# Patient Record
Sex: FEMALE | Race: WHITE | Employment: OTHER | URBAN - METROPOLITAN AREA
[De-identification: names, ages, dates, MRNs, and addresses within clinical notes are randomized per-mention and may not be internally consistent; named-entity substitution may affect disease eponyms.]

---

## 2014-05-19 LAB — PAP-ABSTRACT: NORMAL

## 2017-01-05 ENCOUNTER — TELEPHONE (OUTPATIENT)
Dept: NURSING | Facility: CLINIC | Age: 82
End: 2017-01-05

## 2017-01-05 DIAGNOSIS — Z78.0 MENOPAUSE: Primary | ICD-10-CM

## 2017-01-05 RX ORDER — ESTRADIOL 0.5 MG/1
TABLET ORAL
Qty: 30 TABLET | Refills: 0 | OUTPATIENT
Start: 2017-01-05

## 2017-01-05 NOTE — TELEPHONE ENCOUNTER
Estradiol 0.5mg      Last Written Prescription Date:  11/29/16  Last Fill Quantity: 30,   # refills: 0  Last Office Visit with [facility] primary care provider:  5/20/15  Future Office visit: none    One month extension already received, overdue for annual, still no appt made. Rx denied.

## 2017-01-06 RX ORDER — ESTRADIOL 0.5 MG/1
0.5 TABLET ORAL DAILY
Qty: 30 TABLET | Refills: 0 | Status: SHIPPED | OUTPATIENT
Start: 2017-01-06 | End: 2017-01-26

## 2017-01-06 NOTE — TELEPHONE ENCOUNTER
JOCELYN Villalobos approved Rx for one more month (which she sent electronically to her pharmacy). Called pt a couple times because it sounded like someone picked up the telepone but no one was speaking on the other end and then they hung up. Will call back later to inform her the Rx was sent to her pharmacy for one month.

## 2017-01-06 NOTE — TELEPHONE ENCOUNTER
Pt calling she just scheduled her annual exam with Dr. Haddad for 1/18/17 and would like a refill of the Estradiol 0.5 mg tablet. Takes one tablet everyday.    Estradiol 0.5 MG tablet      Last Written Prescription Date:  11/29/16  Last Fill Quantity: 30 tabs,   # refills: 0  Last Office Visit with [facility] primary care provider:  5/20/15-Annual  Future Office visit: 1/18/17    Routing refill request to provider for review/approval because:  Pt is overdue for annual exam and has already had a one month extension. Since she now she has appt scheduled, ok to send one more month? Rx & pharmacy pended. Routing to JOCELYN Villalobos.

## 2017-01-26 DIAGNOSIS — Z78.0 MENOPAUSE: Primary | ICD-10-CM

## 2017-01-27 RX ORDER — ESTRADIOL 0.5 MG/1
TABLET ORAL
Qty: 30 TABLET | Refills: 0 | Status: SHIPPED | OUTPATIENT
Start: 2017-01-27 | End: 2017-01-31

## 2017-01-27 NOTE — TELEPHONE ENCOUNTER
Estradiol 0.5mg      Last Written Prescription Date:  1/6/17  Last Fill Quantity: 30,   # refills: 0  Last Office Visit with [facility] primary care provider:  5/20/15  Future Office visit: 1/31/17    Additional month supply sent to get pt to her scheduled annual appt.

## 2017-01-31 ENCOUNTER — OFFICE VISIT (OUTPATIENT)
Dept: OBGYN | Facility: CLINIC | Age: 82
End: 2017-01-31
Payer: MEDICARE

## 2017-01-31 VITALS
BODY MASS INDEX: 25.27 KG/M2 | WEIGHT: 148 LBS | DIASTOLIC BLOOD PRESSURE: 94 MMHG | SYSTOLIC BLOOD PRESSURE: 154 MMHG | HEIGHT: 64 IN

## 2017-01-31 DIAGNOSIS — Z78.0 MENOPAUSE: ICD-10-CM

## 2017-01-31 DIAGNOSIS — Z12.31 ENCOUNTER FOR SCREENING MAMMOGRAM FOR MALIGNANT NEOPLASM OF BREAST: ICD-10-CM

## 2017-01-31 DIAGNOSIS — Z13.820 OSTEOPOROSIS SCREENING: ICD-10-CM

## 2017-01-31 DIAGNOSIS — Z01.419 ENCOUNTER FOR GYNECOLOGICAL EXAMINATION WITHOUT ABNORMAL FINDING: Primary | ICD-10-CM

## 2017-01-31 PROCEDURE — G0145 SCR C/V CYTO,THINLAYER,RESCR: HCPCS | Performed by: OBSTETRICS & GYNECOLOGY

## 2017-01-31 PROCEDURE — G0101 CA SCREEN;PELVIC/BREAST EXAM: HCPCS | Mod: GA | Performed by: OBSTETRICS & GYNECOLOGY

## 2017-01-31 RX ORDER — ROSUVASTATIN CALCIUM 20 MG/1
20 TABLET, COATED ORAL DAILY
COMMUNITY
Start: 2017-01-20 | End: 2019-03-20

## 2017-01-31 RX ORDER — LEVETIRACETAM 250 MG/1
250 TABLET ORAL DAILY
COMMUNITY
Start: 2015-09-22 | End: 2019-03-20

## 2017-01-31 RX ORDER — PROPRANOLOL HYDROCHLORIDE 20 MG/1
20 TABLET ORAL 2 TIMES DAILY
COMMUNITY
Start: 2014-04-02 | End: 2019-03-20

## 2017-01-31 RX ORDER — TIOTROPIUM BROMIDE 18 UG/1
1 CAPSULE ORAL; RESPIRATORY (INHALATION) DAILY
COMMUNITY
Start: 2011-06-27 | End: 2019-04-17

## 2017-01-31 RX ORDER — ESCITALOPRAM OXALATE 10 MG/1
TABLET ORAL
COMMUNITY
Start: 2015-12-28 | End: 2019-03-19

## 2017-01-31 RX ORDER — CLONAZEPAM 0.5 MG/1
0.25 TABLET ORAL
COMMUNITY
Start: 2014-11-18 | End: 2019-03-19

## 2017-01-31 RX ORDER — ESTRADIOL 0.5 MG/1
0.5 TABLET ORAL DAILY
Qty: 90 TABLET | Refills: 3 | Status: SHIPPED | OUTPATIENT
Start: 2017-01-31 | End: 2018-02-16

## 2017-01-31 ASSESSMENT — ANXIETY QUESTIONNAIRES
IF YOU CHECKED OFF ANY PROBLEMS ON THIS QUESTIONNAIRE, HOW DIFFICULT HAVE THESE PROBLEMS MADE IT FOR YOU TO DO YOUR WORK, TAKE CARE OF THINGS AT HOME, OR GET ALONG WITH OTHER PEOPLE: NOT DIFFICULT AT ALL
7. FEELING AFRAID AS IF SOMETHING AWFUL MIGHT HAPPEN: NOT AT ALL
3. WORRYING TOO MUCH ABOUT DIFFERENT THINGS: NOT AT ALL
6. BECOMING EASILY ANNOYED OR IRRITABLE: NOT AT ALL
2. NOT BEING ABLE TO STOP OR CONTROL WORRYING: NOT AT ALL
5. BEING SO RESTLESS THAT IT IS HARD TO SIT STILL: NOT AT ALL
1. FEELING NERVOUS, ANXIOUS, OR ON EDGE: NOT AT ALL
GAD7 TOTAL SCORE: 0

## 2017-01-31 ASSESSMENT — PATIENT HEALTH QUESTIONNAIRE - PHQ9: 5. POOR APPETITE OR OVEREATING: NOT AT ALL

## 2017-01-31 NOTE — MR AVS SNAPSHOT
After Visit Summary   1/31/2017    Zoraida Olivarez    MRN: [id number]           Patient Information     Date Of Birth          4/11/1934        Visit Information        Provider Department      1/31/2017 11:30 AM Julien Haddad MD [facility]        Today's Diagnoses     Encounter for gynecological examination without abnormal finding [Z01.419]    -  1     Encounter for screening mammogram for malignant neoplasm of breast         Osteoporosis screening         Menopause            Follow-ups after your visit        Your next 10 appointments already scheduled     Feb 22, 2017 11:00 AM   MA SCREENING DIGITAL BILATERAL with [email or web address]   [facility] ([facility])    [address], [address]  [facility] [id number] [phone]           Do not use any powder, lotion or deodorant under your arms or on your breast. If you do, we will ask you to remove it before your exam.  Wear comfortable, two-piece clothing.  If you have any allergies, tell your care team.  Bring any previous mammograms from other facilities or have them mailed to the breast center.            Feb 22, 2017 11:30 AM   DX HIP/PELVIS/SPINE with [email or web address]   [facility] ([facility])    [address]  [address]  [facility] [id number] [phone]           Please do not take any of the following 48 hours prior to your exam: vitamins, calcium tablets, antacids.              Future tests that were ordered for you today     Open Future Orders        Priority Expected Expires Ordered    *MA Screening Digital Bilateral Routine  1/31/2018 1/31/2017            Who to contact     If you have questions or need follow up information about today's clinic visit or your schedule please contact [facility] directly at [phone].  Normal or non-critical lab and imaging results will be communicated to you by  "MyChart, letter or phone within 4 business days after the clinic has received the results. If you do not hear from us within 7 days, please contact the clinic through [employer] or phone. If you have a critical or abnormal lab result, we will notify you by phone as soon as possible.  Submit refill requests through [employer] or call your pharmacy and they will forward the refill request to us. Please allow 3 business days for your refill to be completed.          Additional Information About Your Visit        [employer] Information     [employer] lets you send messages to your doctor, view your test results, renew your prescriptions, schedule appointments and more. To sign up, go to [email or web address] . Click on \"Log in\" on the left side of the screen, which will take you to the Welcome page. Then click on \"Sign up Now\" on the right side of the page.     You will be asked to enter the access code listed below, as well as some personal information. Please follow the directions to create your username and password.     Your access code is: [email or web address]  Expires: 2017 12:22 PM     Your access code will  in 90 days. If you need help or a new code, please call your [facility] clinic or [phone].        Care EveryWhere ID     This is your Care EveryWhere ID. This could be used by other organizations to access your [facility] medical records  [id number]        Your Vitals Were     Height BMI (Body Mass Index) Breastfeeding?             5' 3.5\" (1.613 m) 25.80 kg/m2 No          Blood Pressure from Last 3 Encounters:   17 154/94   05/20/15 122/70    Weight from Last 3 Encounters:   17 148 lb (67.132 kg)   05/20/15 135 lb (61.236 kg)              We Performed the Following     DEXA - HIM Scan     Medicare Limited Visit     Pap imaged thin layer screen reflex to HPV if ASCUS - recommended age 25 - 29 years          Today's Medication Changes          These changes are accurate as of: 17 12:22 PM.  If you " have any questions, ask your nurse or doctor.               These medicines have changed or have updated prescriptions.        Dose/Directions    estradiol 0.5 MG tablet   Commonly known as:  ESTRACE   This may have changed:  See the new instructions.   Used for:  Menopause   Changed by:  Julien Haddad MD        Dose:  0.5 mg   Take 1 tablet (0.5 mg) by mouth daily   Quantity:  90 tablet   Refills:  3            Where to get your medicines      These medications were sent to [facility] [id number] [place] [address]  [address], [facility] [address]     Phone:  [phone]    - estradiol 0.5 MG tablet             Primary Care Provider Office Phone # Fax #    Luiz Riddle [phone]       NAVJOT NICOLLET [place] [address]  [facility] [address]        Thank you!     Thank you for choosing [facility]  for your care. Our goal is always to provide you with excellent care. Hearing back from our patients is one way we can continue to improve our services. Please take a few minutes to complete the written survey that you may receive in the mail after your visit with us. Thank you!             Your Updated Medication List - Protect others around you: Learn how to safely use, store and throw away your medicines at www.disposemymeds.org.          This list is accurate as of: 1/31/17 12:22 PM.  Always use your most recent med list.                   Brand Name Dispense Instructions for use    ADVAIR DISKUS 250-50 MCG/DOSE diskus inhaler   Generic drug:  fluticasone-salmeterol          aspirin 81 MG EC tablet          clonazePAM 0.5 MG tablet    klonoPIN     Take 0.25 mg by mouth       CRESTOR 20 MG tablet   Generic drug:  rosuvastatin          escitalopram 10 MG tablet    LEXAPRO         estradiol 0.5 MG tablet    ESTRACE    90 tablet    Take 1 tablet (0.5 mg) by mouth daily       levETIRAcetam 250 MG  tablet    KEPPRA     250 mg       propranolol 20 MG tablet    INDERAL         tiotropium 18 MCG capsule    SPIRIVA     Take 1 puff by mouth

## 2017-01-31 NOTE — PROGRESS NOTES
Zoraida is a 82 year old No obstetric history on file. female who presents for Medicare Limited exam.     Do you have a Health Care Directive?: No: Advance care planning was reviewed with patient; patient declined at this time.    Fall risk:   Fallen 2 or more times in the past year?: No  Any fall with injury in the past year?: No    HPI :  No complaints    GYNECOLOGIC HISTORY:  No LMP recorded. Patient is postmenopausal..   reports that she has been smoking.  She has never used smokeless tobacco.  Tobacco Cessation Action Plan: Information offered: Patient not interested at this time  STD testing offered?  Declined  Last PHQ-9 score on record=   PHQ-9 SCORE 2017   Total Score 0     Last GAD7 score on record=   LORA-7 SCORE 2017   Total Score 0       HEALTH MAINTENANCE:  Cholesterol: Followed by PCP  Last Mammo: , Result: abnormal, Next Mammo: Pt does not have future mammo scheduled   Pap: (PAP      NIL   2015 )  DEXA: Unsure  Colonoscopy:  Unknown, Result:  normal, Next Colonoscopy: PRN    HISTORY:  Obstetric History       T2      TAB0   SAB0   E0   M0   L2       # Outcome Date GA Lbr Vic/2nd Weight Sex Delivery Anes PTL Lv   2 Term         Y   1 Term         Y        No past medical history on file.  No past surgical history on file.  No family history on file.  Social History     Social History     Marital Status:      Spouse Name: N/A     Number of Children: N/A     Years of Education: N/A     Social History Main Topics     Smoking status: Current Every Day Smoker     Smokeless tobacco: Never Used     Alcohol Use: Yes     Drug Use: No     Sexual Activity: Not Currently     Other Topics Concern     Not on file     Social History Narrative     No narrative on file     Current Outpatient Prescriptions   Medication Sig     aspirin 81 MG EC tablet      clonazePAM (KLONOPIN) 0.5 MG tablet Take 0.25 mg by mouth     rosuvastatin (CRESTOR) 20 MG tablet      escitalopram  "(LEXAPRO) 10 MG tablet      fluticasone-salmeterol (ADVAIR DISKUS) 250-50 MCG/DOSE diskus inhaler      levETIRAcetam (KEPPRA) 250 MG tablet 250 mg     propranolol (INDERAL) 20 MG tablet      tiotropium (SPIRIVA) 18 MCG capsule Take 1 puff by mouth     estradiol (ESTRACE) 0.5 MG tablet TAKE 1 TABLET BY MOUTH DAILY     No current facility-administered medications for this visit.     No Known Allergies    Past medical, surgical, social and family history were reviewed and updated in EPIC.    EXAM:  Ht 5' 3.5\" (1.613 m)  Wt 148 lb (67.132 kg)  BMI 25.80 kg/m2  Breastfeeding? No   BMI: Body mass index is 25.8 kg/(m^2).    Constitutional: Appearance: Well nourished, well developed alert, in no acute distress  Breasts: Inspection of Breasts:  No lymphadenopathy present    Palpation of Breasts and Axillae:  No masses present on palpation, no  breast tenderness    Axillary Lymph Nodes:  No lymphadenopathy present  Neurologic/Psychiatric:    Mental Status:  Oriented X3     Pelvic Exam:  External Genitalia:     Normal appearance for age, no discharge present, no tenderness present, no inflammatory lesions present, color normal  Vagina:     Normal vaginal vault without central or paravaginal defects, no discharge present, no inflammatory lesions present, no masses present  Bladder:     Nontender to palpation  Urethra:   Urethral Body:  Urethra palpation normal, urethra structural support normal   Urethral Meatus:  No erythema or lesions present  Cervix:     Surgically absent  Uterus:     Surgically absent  Adnexa:     No adnexal tenderness present, no adnexal masses present  Perineum:     Perineum within normal limits, no evidence of trauma, no rashes or skin lesions present  Anus:     Anus within normal limits, no hemorrhoids present  Inguinal Lymph Nodes:     No lymphadenopathy present  Pubic Hair:     Normal pubic hair distribution for age  Genitalia and Groin:     No rashes present, no lesions present, no areas of " discoloration, no masses present    Body mass index is 25.8 kg/(m^2).     reports that she has been smoking.  She has never used smokeless tobacco.  Tobacco Cessation Action Plan: Information offered: Patient not interested at this time    ASSESSMENT:  82 year old female with satisfactory annual exam.    ICD-10-CM    1. Encounter for gynecological examination without abnormal finding [Z01.419] Z01.419        COUNSELING:   Reviewed preventive health counseling, as reflected in patient instructions       Regular exercise       Healthy diet/nutrition    PLAN/PATIENT INSTRUCTIONS:    There are no Patient Instructions on file for this visit.    Julien Haddad MD

## 2017-01-31 NOTE — Clinical Note
[facility]  [address] [place] [address] , [address]  [place]   [address] [phone]      2/2/2017     Zoraida Olivarez   [address]  [place] [address]      Dear Zoraida,  We are happy to inform you that your PAP smear result is normal.  We are now able to do a follow up test on PAP smears. The DNA test is for HPV (Human Papilloma Virus). Cervical cancer is closely linked with certain types of HPV. Your result showed no evidence of HPV.  Therefore we recommend you return in 1 year for your next pap smear.  You will still need to return to the clinic every year for an annual exam and other preventive tests.  Please contact the clinic with any questions.  Sincerely,    Julien Haddad MD

## 2017-02-01 ASSESSMENT — ANXIETY QUESTIONNAIRES: GAD7 TOTAL SCORE: 0

## 2017-02-01 ASSESSMENT — PATIENT HEALTH QUESTIONNAIRE - PHQ9: SUM OF ALL RESPONSES TO PHQ QUESTIONS 1-9: 0

## 2017-02-02 LAB
COPATH REPORT: NORMAL
PAP: NORMAL

## 2017-02-22 ENCOUNTER — RADIANT APPOINTMENT (OUTPATIENT)
Dept: MAMMOGRAPHY | Facility: CLINIC | Age: 82
End: 2017-02-22
Payer: MEDICARE

## 2017-02-22 ENCOUNTER — RADIANT APPOINTMENT (OUTPATIENT)
Dept: BONE DENSITY | Facility: CLINIC | Age: 82
End: 2017-02-22
Payer: MEDICARE

## 2017-02-22 DIAGNOSIS — Z12.31 ENCOUNTER FOR SCREENING MAMMOGRAM FOR MALIGNANT NEOPLASM OF BREAST: ICD-10-CM

## 2017-02-22 DIAGNOSIS — Z78.0 ASYMPTOMATIC POSTMENOPAUSAL STATE: ICD-10-CM

## 2017-02-22 PROCEDURE — 77080 DXA BONE DENSITY AXIAL: CPT

## 2017-02-22 PROCEDURE — G0202 SCR MAMMO BI INCL CAD: HCPCS | Mod: TC

## 2018-02-16 DIAGNOSIS — Z78.0 MENOPAUSE: ICD-10-CM

## 2018-02-19 DIAGNOSIS — Z78.0 MENOPAUSE: ICD-10-CM

## 2018-02-19 RX ORDER — ESTRADIOL 0.5 MG/1
TABLET ORAL
Qty: 30 TABLET | Refills: 0 | Status: SHIPPED | OUTPATIENT
Start: 2018-02-19 | End: 2018-03-29

## 2018-02-19 RX ORDER — ESTRADIOL 0.5 MG/1
TABLET ORAL
Qty: 90 TABLET | Refills: 0 | OUTPATIENT
Start: 2018-02-19

## 2018-02-19 NOTE — TELEPHONE ENCOUNTER
Estradiol 0.5mg      Last Written Prescription Date:  2/19/18  Last Fill Quantity: 30,   # refills: 0  Last Office Visit: 1/31/17  Future Office visit:       90 day supply request denied, [place] protocol, one month supply only.

## 2018-02-19 NOTE — TELEPHONE ENCOUNTER
Estradiol 0.5mg      Last Written Prescription Date:  1/31/17  Last Fill Quantity: 90,   # refills: 3  Last Office Visit: 1/31/17  Future Office visit:       Medication is being filled for 1 time refill only due to:  DUE FOR ANNUAL  Pt due for annual, no appt scheduled. One month extension sent per [employer] protocol.

## 2018-03-21 DIAGNOSIS — Z78.0 MENOPAUSE: ICD-10-CM

## 2018-03-21 RX ORDER — ESTRADIOL 0.5 MG/1
TABLET ORAL
Qty: 30 TABLET | Refills: 0 | OUTPATIENT
Start: 2018-03-21

## 2018-03-26 DIAGNOSIS — Z78.0 MENOPAUSE: ICD-10-CM

## 2018-03-26 RX ORDER — ESTRADIOL 0.5 MG/1
TABLET ORAL
Qty: 30 TABLET | Refills: 0 | OUTPATIENT
Start: 2018-03-26

## 2018-03-26 NOTE — TELEPHONE ENCOUNTER
estradiol (ESTRACE) 0.5 MG tablet    Last Written Prescription Date:  2/19/18  Last Fill Quantity: 30,   # refills: 0  Last Office Visit with [facility] primary care provider:  1/31/17  Future Office visit: none    Routing refill request to provider for review/approval because:  Refill denied.

## 2018-03-28 ENCOUNTER — TELEPHONE (OUTPATIENT)
Dept: NURSING | Facility: CLINIC | Age: 83
End: 2018-03-28

## 2018-03-28 DIAGNOSIS — Z78.0 MENOPAUSE: ICD-10-CM

## 2018-03-28 NOTE — TELEPHONE ENCOUNTER
Pt calling in for Estradiol (estrace) refill. Has two tablets left. Reviewed with pt that she's due to schedule annual appt with MD Haddad. She was transferred to Scheduling and now has appt set for 4/4/18.       Rx:  Estradiol (Estrace) 0.5mg tab, 1 tab PO daily    Last Written Prescription Date:  2/19/18  Last Fill Quantity: 30,   # refills: 0  Last Office Visit: 1/31/17  Future Office visit:    Next 5 appointments (look out 90 days)     Apr 04, 2018 11:15 AM CDT   PHYSICAL with Julien Haddad MD   [facility] ([facility]    [address] [place] [id number] [phone]                 Pt due for annual, appt scheduled, 3 month supply sent for insurance purposes.

## 2018-03-29 RX ORDER — ESTRADIOL 0.5 MG/1
TABLET ORAL
Qty: 90 TABLET | Refills: 0 | Status: SHIPPED | OUTPATIENT
Start: 2018-03-29 | End: 2018-06-24

## 2018-04-03 ENCOUNTER — TELEPHONE (OUTPATIENT)
Dept: OBGYN | Facility: CLINIC | Age: 83
End: 2018-04-03

## 2018-04-03 NOTE — TELEPHONE ENCOUNTER
Pt states that someone called her with questions about a mammogram I am only seeing a encounter from yesterday about a refill request.  Pt seems a little confused as to what the call was about.  I asked her if it was the appointment reminder call from yesterday and she said no.

## 2018-04-03 NOTE — TELEPHONE ENCOUNTER
Talked with clinic scheduler (Johanne Lopez-who sent message below) and she indicated Triage does not need to call pt as she has figured out what the pt was confused about.      Closing encounter.

## 2018-06-24 DIAGNOSIS — Z78.0 MENOPAUSE: ICD-10-CM

## 2018-06-26 RX ORDER — ESTRADIOL 0.5 MG/1
TABLET ORAL
Qty: 30 TABLET | Refills: 0 | Status: SHIPPED | OUTPATIENT
Start: 2018-06-26 | End: 2019-03-20

## 2018-06-26 NOTE — TELEPHONE ENCOUNTER
"Requested Prescriptions   Pending Prescriptions Disp Refills     estradiol (ESTRACE) 0.5 MG tablet [Pharmacy Med Name: ESTRADIOL 0.5MG TABLETS] 90 tablet 0     Sig: TAKE 1 TABLET(0.5 MG) BY MOUTH DAILY  Last Written Prescription Date:  3/29/18  Last Fill Quantity: 90,  # refills: 0   Last office visit: No previous visit found with prescribing provider:  Dr. Haddad 1/31/17   Future Office Visit:  None      Hormone Replacement Therapy Failed    6/24/2018 11:45 AM       Failed - Blood pressure under 140/90 in past 12 months    BP Readings from Last 3 Encounters:   01/31/17 (!) 154/94   05/20/15 122/70                Failed - Recent (12 mo) or future (30 days) visit within the authorizing provider's specialty    Patient had office visit in the last 12 months or has a visit in the next 30 days with authorizing provider or within the authorizing provider's specialty.  See \"Patient Info\" tab in inbasket, or \"Choose Columns\" in Meds & Orders section of the refill encounter.           Passed - Patient is 18 years of age or older       Passed - No active pregnancy on record       Passed - No positive pregnancy test on record in past 12 months      Medication is being filled for 1 time refill only due to:  Patient needs to be seen because it has been more than one year since last visit.      "

## 2019-03-13 ENCOUNTER — DOCUMENTATION ONLY (OUTPATIENT)
Dept: OTHER | Facility: CLINIC | Age: 84
End: 2019-03-13

## 2019-03-18 ENCOUNTER — ASSISTED LIVING VISIT (OUTPATIENT)
Dept: GERIATRICS | Facility: CLINIC | Age: 84
End: 2019-03-18
Payer: MEDICARE

## 2019-03-18 VITALS
RESPIRATION RATE: 20 BRPM | HEIGHT: 65 IN | BODY MASS INDEX: 23.82 KG/M2 | WEIGHT: 143 LBS | OXYGEN SATURATION: 96 % | HEART RATE: 68 BPM | TEMPERATURE: 98.3 F | SYSTOLIC BLOOD PRESSURE: 128 MMHG | DIASTOLIC BLOOD PRESSURE: 67 MMHG

## 2019-03-18 DIAGNOSIS — I73.9 PVD (PERIPHERAL VASCULAR DISEASE) (H): ICD-10-CM

## 2019-03-18 DIAGNOSIS — F32.1 CURRENT MODERATE EPISODE OF MAJOR DEPRESSIVE DISORDER, UNSPECIFIED WHETHER RECURRENT (H): ICD-10-CM

## 2019-03-18 DIAGNOSIS — J44.9 CHRONIC OBSTRUCTIVE PULMONARY DISEASE, UNSPECIFIED COPD TYPE (H): ICD-10-CM

## 2019-03-18 DIAGNOSIS — R41.89 COGNITIVE IMPAIRMENT: ICD-10-CM

## 2019-03-18 DIAGNOSIS — F51.01 PRIMARY INSOMNIA: ICD-10-CM

## 2019-03-18 DIAGNOSIS — K59.01 SLOW TRANSIT CONSTIPATION: ICD-10-CM

## 2019-03-18 DIAGNOSIS — M79.675 PAIN IN TOE OF LEFT FOOT: ICD-10-CM

## 2019-03-18 DIAGNOSIS — G25.0 ESSENTIAL TREMOR: ICD-10-CM

## 2019-03-18 DIAGNOSIS — Z86.79 HX OF ANEURYSM: ICD-10-CM

## 2019-03-18 DIAGNOSIS — I48.20 CHRONIC ATRIAL FIBRILLATION (H): ICD-10-CM

## 2019-03-18 DIAGNOSIS — Z76.89 ENCOUNTER TO ESTABLISH CARE: Primary | ICD-10-CM

## 2019-03-18 DIAGNOSIS — R13.10 DYSPHAGIA, UNSPECIFIED TYPE: ICD-10-CM

## 2019-03-18 PROBLEM — N39.0 URINARY TRACT INFECTION WITHOUT HEMATURIA: Status: ACTIVE | Noted: 2019-02-12

## 2019-03-18 PROBLEM — G93.41 METABOLIC ENCEPHALOPATHY: Status: ACTIVE | Noted: 2019-02-12

## 2019-03-18 PROBLEM — E87.6 HYPOKALEMIA: Status: ACTIVE | Noted: 2019-03-18

## 2019-03-18 PROBLEM — Z81.2 FAMILY HISTORY OF TOBACCO ABUSE AND DEPENDENCE: Status: ACTIVE | Noted: 2019-03-18

## 2019-03-18 PROBLEM — K52.9 CHRONIC DIARRHEA: Status: ACTIVE | Noted: 2019-03-18

## 2019-03-18 RX ORDER — ACETAMINOPHEN 325 MG/1
650 TABLET ORAL EVERY 6 HOURS PRN
COMMUNITY
End: 2020-05-01

## 2019-03-18 RX ORDER — POLYETHYLENE GLYCOL 3350 17 G/17G
17 POWDER, FOR SOLUTION ORAL DAILY
COMMUNITY
End: 2020-02-14

## 2019-03-18 RX ORDER — LANOLIN ALCOHOL/MO/W.PET/CERES
3 CREAM (GRAM) TOPICAL AT BEDTIME
COMMUNITY
End: 2019-03-20

## 2019-03-18 RX ORDER — ALBUTEROL SULFATE 90 UG/1
2 AEROSOL, METERED RESPIRATORY (INHALATION) EVERY 4 HOURS PRN
COMMUNITY
End: 2020-02-14

## 2019-03-18 RX ORDER — LACTOSE-REDUCED FOOD
LIQUID (ML) ORAL 2 TIMES DAILY
COMMUNITY
End: 2021-11-07

## 2019-03-18 ASSESSMENT — MIFFLIN-ST. JEOR: SCORE: 1091.58

## 2019-03-18 NOTE — LETTER
"    3/18/2019        RE: Zoraida Olivarez  [facility]  [address]  [facility] [address]        [place] [facility]  PRIMARY CARE PROVIDER AND CLINIC:  Lynn Gallagher, CYNDI CNP, [address] [facility] [address] / [facility] [address]  Chief Complaint   Patient presents with     [facility]     [place] Medical Record Number:  [id number]  Place of Service where encounter took place:  [facility] ([facility]) [[id number]]    Zoraida Olivarez  is a 84 year old  (4/11/1934) female with a medical history of PVD and COPD. She was hospitalized at [facility] from 2/12-2/18/19 after being found down at home by her daughter. She was found to have UTI in which she was treated with antibiotics; and received oral steroids for COPD exacerbation and a course of doxycline for pneumonia. She was discharged to [facility] TCU stay 2/18/19 through 3/13/19. TCU stay uncomplicated; treated for another UTI and due to declining cognition, was unable to return to prior independent living so she moved to [facility].     Ms. Olivarez was visited today while in her room. She denies complaints of pain or discomfort. She has a productive cough but unable to cough up the phlegm. Denies shortness of breath. Asked if she recalled her address, \"why are you asking me that hard question?\" Reports that she has 5 grandchildren and 2 children. She was visited by her grandaughter and great grandchildren yesterday which she enjoyed. Daughter reports that Dylan smoked 1/2-1 pack of cigarettes per day since she was 16 years old. She has frequently put her jacket on and grabs her purse seeking to go outside to smoke. Staff does not have concerns and report she has been eating well but has pocketed food.   /72, HR 62, O2-92% on room air.     CODE STATUS/ADVANCE DIRECTIVES DISCUSSION:   DNR / DNI  Patient's living condition: lives in assisted living  ALLERGIES: " Marisol anti-gas [simethicone]  PAST MEDICAL HISTORY:  has a past medical history of Cerebral aneurysm, Chronic obstructive lung disease (H), Hearing loss, sensorineural, combined types, History of shingles, Hyperlipidemia, Ileitis, Incomplete RBBB, Major depression, Memory loss, PAD (peripheral artery disease) (H), Polyp of colon, adenomatous, Skin cancer of arm, Tobacco abuse, and Tremor, essential.  PAST SURGICAL HISTORY:   has a past surgical history that includes appendectomy; Cholecystectomy; and Hysterectomy.  FAMILY HISTORY: family history includes Cancer in her brother, father, mother, and another family member; Heart Disease in her father.  SOCIAL HISTORY:   reports that she has been smoking.  she has never used smokeless tobacco. She reports that she drinks alcohol. She reports that she does not use drugs.    Post Discharge Medication Reconciliation Status: discharge medications reconciled, continue medications without change    Current Outpatient Medications   Medication Sig Dispense Refill     acetaminophen (TYLENOL) 325 MG tablet Take 650 mg by mouth daily as needed for mild pain       albuterol (PROAIR HFA/PROVENTIL HFA/VENTOLIN HFA) 108 (90 Base) MCG/ACT inhaler Inhale 2 puffs into the lungs every 4 hours as needed for shortness of breath / dyspnea or wheezing       aspirin 81 MG EC tablet Take 81 mg by mouth daily        estradiol (ESTRACE) 0.5 MG tablet TAKE 1 TABLET(0.5 MG) BY MOUTH DAILY 30 tablet 0     fluticasone-salmeterol (ADVAIR DISKUS) 250-50 MCG/DOSE diskus inhaler Inhale 1 puff into the lungs 2 times daily        guaiFENesin (MUCINEX) 600 MG 12 hr tablet Take 600 mg by mouth 2 times daily       levETIRAcetam (KEPPRA) 250 MG tablet Take 250 mg by mouth daily        melatonin 3 MG tablet Take 3 mg by mouth At Bedtime       Nutritional Supplements (ENSURE PLUS) LIQD Take by mouth 2 times daily       polyethylene glycol (MIRALAX/GLYCOLAX) packet Take 17 g by mouth daily       propranolol  "(INDERAL) 20 MG tablet Take 20 mg by mouth 2 times daily        rosuvastatin (CRESTOR) 20 MG tablet Take 20 mg by mouth daily        Sennosides (EQ NATURAL VEGETABLE LAXATIVE PO) Take 8.6 mg by mouth daily as needed       tiotropium (SPIRIVA) 18 MCG capsule Inhale 1 puff into the lungs daily        clonazePAM (KLONOPIN) 0.5 MG tablet Take 0.25 mg by mouth       escitalopram (LEXAPRO) 10 MG tablet        ROS:  4 point ROS including Respiratory, CV, GI and , other than that noted in the HPI,  is negative    Vitals:  /67   Pulse 68   Temp 98.3  F (36.8  C)   Resp 20   Ht 1.638 m (5' 4.5\")   Wt 64.9 kg (143 lb)   SpO2 96%   BMI 24.17 kg/m     Exam:  GENERAL APPEARANCE:  Alert, in no distress, pleasant, cooperative.  EYES:  EOM, lids, pupils and irises normal, sclera clear and conjunctiva normal, no discharge or mattering on lids or lashes noted  ENT:  Mouth normal, moist mucous membranes, nose without drainage or crusting, external ears without lesions, hearing acuity intact  NECK: supple, symmetrical  RESP:  respiratory effort and palpation of chest normal, no chest wall tenderness, no respiratory distress, Lung sounds diminished in bilateral bases otherwise clear, patient is on room air  CV:  Palpation and auscultation of heart done, rate and rhythm regular, no murmur. Edema none in bilateral lower extremities. VASCULAR: warm extremities without open areas. Vascular color changes  ABDOMEN:  normal bowel sounds, soft, nontender.  M/S:   Gait and station ambulates independently, no tenderness or swelling of the joints; able to move all extremities   SKIN:  Inspection and palpation of skin and subcutaneous tissue: skin warm, dry and intact without rashes. L great toe with bruising. No redness, warmth or swelling present.  NEURO: cranial nerves 2-12 grossly intact, no facial asymmetry, no speech deficits and able to follow directions, moves all extremities symmetrically  PSYCH:  insight and judgement " intact, memory impaired, affect and mood normal      Lab/Diagnostic data:  Labs done while at [facility] done by [facility] lab. All labs were reviewed in care everywhere.     ASSESSMENT/PLAN:  (J44.9) Chronic obstructive pulmonary disease, unspecified COPD type (H)  Comment: was treated for exacerbation while in the hospital. No longer has wheezing but now with chronic cough secondary to smoking 1/2-1 PPD for 60+ years.   Plan: guafenesis 600 mg q12h x 7 days then BID PRN.   -- continue with advair 1 puff BID  -- continue with spiriva 1 puff daily    (I73.9) PVD (peripheral vascular disease) (H)  Comment: warm extremities but has chronic skin changes.   Plan: continue with rosuvastatin 20 mg daily    (I48.2) Chronic atrial fibrillation (H)  Comment: had RVR while in the hospital; not put on AC given falls risk and cognitive impairment. Regular rhythm today with controlled rate. Is on propranolol for tremors which is in turn controlling rate.   Plan: continue with propranolol 20 mg BID    (F32.1) Current moderate episode of major depressive disorder, unspecified whether recurrent (H)  Comment: was on lexapro when living at home independently but was discontinued during hospitalization for reasons unknown. She has a history of both anxiety and depression and daughter reports she tolerated the lexapro well. Given the new facility and surroundings, she would benefit from restarting.   Plan: Restart lexapro 10 mg daily    (R41.89) Cognitive impairment  Comment: SLUMS 1/30 and ACL 3.8 during TCU stay. She appears to have a much higher score than 1/30 from our conversation today. Assume that score is higher now that she does not have acute illness. Could consider aricept or namenda in the future but will defer to neurologist as she has known him for 40+ years.   Plan: continue with memory care assisted living for assistance with cares, meals and medications.   -- OT to administer further cognitive  testing.     (M79.675) Pain in toe of left foot  Comment: no s/sx of infection or ingrown toenail. Appears more trauma related.   Plan:Tylenol for comfort.     (R13.10) Dysphagia, unspecified type  Comment: TCU notes with reports of pocketing food. Had a swallow evaluation done on  which was negative but she was started on a mechanical soft diet.   Plan:  SLP to evaluate swallowing.     (G25.0) Essential tremor  Comment: most notable in the right hand.   Plan: continue with propranolol 20 mg BID    (Z86.79) Hx of aneurysm  Comment: daughter reports this happened 40+ years ago and is now followed by Dr. Hightower with [facility].   Plan: continue with keppra 250 mg daily.   --follow up with neurology as scheduled in April.     (F51.01) Primary insomnia  Comment: sleeping well.   Plan: continue with melatonin at HS.     (K59.01) Slow transit constipation  Comment: having regular bowel movements.   Plan: continue with miralax daily.     Total time with a new patient visit in the assisted livin minutes including discussions about the POC and care coordination with the patient and family, Nava. Greater than 50% of total time spent with counseling and coordinating care due to chart review, phone call to Nava, new decline in cognitive impairment, COPD.  Electronically signed by:  CYNDI Miramontes CNP                     Sincerely,        CYNDI Miramontes CNP

## 2019-03-18 NOTE — PROGRESS NOTES
"[place] [facility]  PRIMARY CARE PROVIDER AND CLINIC:  Lynn Gallagher, CYNDI CNP, [address] / [place] [address]  Chief Complaint   Patient presents with     [facility]     [place] Medical Record Number:  [id number]  Place of Service where encounter took place:  [facility] ([facility]) [[id number]]    Zoraida Olivarez  is a 84 year old  (4/11/1934) female with a medical history of PVD and COPD. She was hospitalized at [facility] from 2/12-2/18/19 after being found down at home by her daughter. She was found to have UTI in which she was treated with antibiotics; and received oral steroids for COPD exacerbation and a course of doxycline for pneumonia. She was discharged to [facility] TCU stay 2/18/19 through 3/13/19. TCU stay uncomplicated; treated for another UTI and due to declining cognition, was unable to return to prior independent living so she moved to [facility].     Ms. Olivarez was visited today while in her room. She denies complaints of pain or discomfort. She has a productive cough but unable to cough up the phlegm. Denies shortness of breath. Asked if she recalled her address, \"why are you asking me that hard question?\" Reports that she has 5 grandchildren and 2 children. She was visited by her grandaughter and great grandchildren yesterday which she enjoyed. Daughter reports that Dylan smoked 1/2-1 pack of cigarettes per day since she was 16 years old. She has frequently put her jacket on and grabs her purse seeking to go outside to smoke. Staff does not have concerns and report she has been eating well but has pocketed food.   /72, HR 62, O2-92% on room air.     CODE STATUS/ADVANCE DIRECTIVES DISCUSSION:   DNR / DNI  Patient's living condition: lives in assisted living  ALLERGIES: Tanya-seltzer anti-gas [simethicone]  PAST MEDICAL HISTORY:  has a past medical history of Cerebral aneurysm, Chronic " obstructive lung disease (H), Hearing loss, sensorineural, combined types, History of shingles, Hyperlipidemia, Ileitis, Incomplete RBBB, Major depression, Memory loss, PAD (peripheral artery disease) (H), Polyp of colon, adenomatous, Skin cancer of arm, Tobacco abuse, and Tremor, essential.  PAST SURGICAL HISTORY:   has a past surgical history that includes appendectomy; Cholecystectomy; and Hysterectomy.  FAMILY HISTORY: family history includes Cancer in her brother, father, mother, and another family member; Heart Disease in her father.  SOCIAL HISTORY:   reports that she has been smoking.  she has never used smokeless tobacco. She reports that she drinks alcohol. She reports that she does not use drugs.    Post Discharge Medication Reconciliation Status: discharge medications reconciled, continue medications without change    Current Outpatient Medications   Medication Sig Dispense Refill     acetaminophen (TYLENOL) 325 MG tablet Take 650 mg by mouth daily as needed for mild pain       albuterol (PROAIR HFA/PROVENTIL HFA/VENTOLIN HFA) 108 (90 Base) MCG/ACT inhaler Inhale 2 puffs into the lungs every 4 hours as needed for shortness of breath / dyspnea or wheezing       aspirin 81 MG EC tablet Take 81 mg by mouth daily        estradiol (ESTRACE) 0.5 MG tablet TAKE 1 TABLET(0.5 MG) BY MOUTH DAILY 30 tablet 0     fluticasone-salmeterol (ADVAIR DISKUS) 250-50 MCG/DOSE diskus inhaler Inhale 1 puff into the lungs 2 times daily        guaiFENesin (MUCINEX) 600 MG 12 hr tablet Take 600 mg by mouth 2 times daily       levETIRAcetam (KEPPRA) 250 MG tablet Take 250 mg by mouth daily        melatonin 3 MG tablet Take 3 mg by mouth At Bedtime       Nutritional Supplements (ENSURE PLUS) LIQD Take by mouth 2 times daily       polyethylene glycol (MIRALAX/GLYCOLAX) packet Take 17 g by mouth daily       propranolol (INDERAL) 20 MG tablet Take 20 mg by mouth 2 times daily        rosuvastatin (CRESTOR) 20 MG tablet Take 20 mg by  "mouth daily        Sennosides (EQ NATURAL VEGETABLE LAXATIVE PO) Take 8.6 mg by mouth daily as needed       tiotropium (SPIRIVA) 18 MCG capsule Inhale 1 puff into the lungs daily        clonazePAM (KLONOPIN) 0.5 MG tablet Take 0.25 mg by mouth       escitalopram (LEXAPRO) 10 MG tablet        ROS:  4 point ROS including Respiratory, CV, GI and , other than that noted in the HPI,  is negative    Vitals:  /67   Pulse 68   Temp 98.3  F (36.8  C)   Resp 20   Ht 1.638 m (5' 4.5\")   Wt 64.9 kg (143 lb)   SpO2 96%   BMI 24.17 kg/m    Exam:  GENERAL APPEARANCE:  Alert, in no distress, pleasant, cooperative.  EYES:  EOM, lids, pupils and irises normal, sclera clear and conjunctiva normal, no discharge or mattering on lids or lashes noted  ENT:  Mouth normal, moist mucous membranes, nose without drainage or crusting, external ears without lesions, hearing acuity intact  NECK: supple, symmetrical  RESP:  respiratory effort and palpation of chest normal, no chest wall tenderness, no respiratory distress, Lung sounds diminished in bilateral bases otherwise clear, patient is on room air  CV:  Palpation and auscultation of heart done, rate and rhythm regular, no murmur. Edema none in bilateral lower extremities. VASCULAR: warm extremities without open areas. Vascular color changes  ABDOMEN:  normal bowel sounds, soft, nontender.  M/S:   Gait and station ambulates independently, no tenderness or swelling of the joints; able to move all extremities   SKIN:  Inspection and palpation of skin and subcutaneous tissue: skin warm, dry and intact without rashes. L great toe with bruising. No redness, warmth or swelling present.  NEURO: cranial nerves 2-12 grossly intact, no facial asymmetry, no speech deficits and able to follow directions, moves all extremities symmetrically  PSYCH:  insight and judgement intact, memory impaired, affect and mood normal      Lab/Diagnostic data:  [facility] done by [facility] lab. All labs were reviewed in care everywhere.     ASSESSMENT/PLAN:  (J44.9) Chronic obstructive pulmonary disease, unspecified COPD type (H)  Comment: was treated for exacerbation while in the hospital. No longer has wheezing but now with chronic cough secondary to smoking 1/2-1 PPD for 60+ years.   Plan: guafenesis 600 mg q12h x 7 days then BID PRN.   -- continue with advair 1 puff BID  -- continue with spiriva 1 puff daily    (I73.9) PVD (peripheral vascular disease) (H)  Comment: warm extremities but has chronic skin changes.   Plan: continue with rosuvastatin 20 mg daily    (I48.2) Chronic atrial fibrillation (H)  Comment: had RVR while in the hospital; not put on AC given falls risk and cognitive impairment. Regular rhythm today with controlled rate. Is on propranolol for tremors which is in turn controlling rate.   Plan: continue with propranolol 20 mg BID    (F32.1) Current moderate episode of major depressive disorder, unspecified whether recurrent (H)  Comment: was on lexapro when living at home independently but was discontinued during hospitalization for reasons unknown. She has a history of both anxiety and depression and daughter reports she tolerated the lexapro well. Given the new facility and surroundings, she would benefit from restarting.   Plan: Restart lexapro 10 mg daily    (R41.89) Cognitive impairment  Comment: SLUMS 1/30 and ACL 3.8 during TCU stay. She appears to have a much higher score than 1/30 from our conversation today. Assume that score is higher now that she does not have acute illness. Could consider aricept or namenda in the future but will defer to neurologist as she has known him for 40+ years.   Plan: continue with memory care assisted living for assistance with cares, meals and medications.   -- OT to administer further cognitive testing.     (M99.302) Pain in toe of left foot  Comment: no s/sx of infection or ingrown toenail. Appears more trauma  related.   Plan:Tylenol for comfort.     (R13.10) Dysphagia, unspecified type  Comment: TCU notes with reports of pocketing food. Had a swallow evaluation done on  which was negative but she was started on a mechanical soft diet.   Plan:  SLP to evaluate swallowing.     (G25.0) Essential tremor  Comment: most notable in the right hand.   Plan: continue with propranolol 20 mg BID    (Z86.79) Hx of aneurysm  Comment: daughter reports this happened 40+ years ago and is now followed by Dr. Hightower with [facility].   Plan: continue with keppra 250 mg daily.   --follow up with neurology as scheduled in April.     (F51.01) Primary insomnia  Comment: sleeping well.   Plan: continue with melatonin at HS.     (K59.01) Slow transit constipation  Comment: having regular bowel movements.   Plan: continue with miralax daily.     Total time with a new patient visit in the assisted livin minutes including discussions about the POC and care coordination with the patient and family, Nava. Greater than 50% of total time spent with counseling and coordinating care due to chart review, phone call to Nava, new decline in cognitive impairment, COPD.  Electronically signed by:  CYNDI Miramontes CNP

## 2019-03-19 RX ORDER — GUAIFENESIN 600 MG/1
600 TABLET, EXTENDED RELEASE ORAL 2 TIMES DAILY
COMMUNITY
End: 2019-03-31

## 2019-03-21 ENCOUNTER — RECORDS - HEALTHEAST (OUTPATIENT)
Dept: LAB | Facility: CLINIC | Age: 84
End: 2019-03-21

## 2019-03-21 LAB — TSH SERPL DL<=0.005 MIU/L-ACNC: 1.74 UIU/ML (ref 0.3–5)

## 2019-03-22 LAB — 25(OH)D3 SERPL-MCNC: 49.3 NG/ML (ref 30–80)

## 2019-03-29 ENCOUNTER — ASSISTED LIVING VISIT (OUTPATIENT)
Dept: GERIATRICS | Facility: CLINIC | Age: 84
End: 2019-03-29
Payer: MEDICARE

## 2019-03-29 VITALS
BODY MASS INDEX: 22.81 KG/M2 | HEART RATE: 76 BPM | WEIGHT: 135 LBS | RESPIRATION RATE: 18 BRPM | DIASTOLIC BLOOD PRESSURE: 62 MMHG | SYSTOLIC BLOOD PRESSURE: 130 MMHG | TEMPERATURE: 97.6 F

## 2019-03-29 DIAGNOSIS — M79.675 PAIN IN TOE OF LEFT FOOT: ICD-10-CM

## 2019-03-29 DIAGNOSIS — J44.9 CHRONIC OBSTRUCTIVE PULMONARY DISEASE, UNSPECIFIED COPD TYPE (H): Primary | ICD-10-CM

## 2019-03-29 DIAGNOSIS — F32.1 CURRENT MODERATE EPISODE OF MAJOR DEPRESSIVE DISORDER, UNSPECIFIED WHETHER RECURRENT (H): ICD-10-CM

## 2019-03-29 DIAGNOSIS — R41.89 COGNITIVE IMPAIRMENT: ICD-10-CM

## 2019-03-29 DIAGNOSIS — I48.20 CHRONIC ATRIAL FIBRILLATION (H): ICD-10-CM

## 2019-03-29 RX ORDER — GINSENG 100 MG
CAPSULE ORAL DAILY
COMMUNITY
End: 2019-06-12

## 2019-03-29 RX ORDER — ESCITALOPRAM OXALATE 10 MG/1
10 TABLET ORAL DAILY
COMMUNITY
End: 2019-04-22

## 2019-03-29 RX ORDER — IPRATROPIUM BROMIDE AND ALBUTEROL SULFATE 2.5; .5 MG/3ML; MG/3ML
1 SOLUTION RESPIRATORY (INHALATION) EVERY 4 HOURS PRN
COMMUNITY
End: 2019-06-04

## 2019-03-29 NOTE — PROGRESS NOTES
[facility]  [place] Medical Record Number:  [id number]  Place of Service where encounter took place:  [facility] [[id number]]  Chief Complaint   Patient presents with     RECHECK       HPI:    Zoraida Olivarez  is a 84 year old (4/11/1934), who is being seen today for an episodic care visit.  HPI information obtained from: facility chart records, facility staff, patient report, [facility] chart review and family/first contact Nava report.     Resident visited today for a follow up visit. She appears to be settling in and adjusting to new setting. Staff does not have concerns and note that her cough has subsided. Resident denies chest pain, shortness of breath or discomfort. Denies pain to the toe. Feels tired today, believes she slept well last night. Spoke with Nava, daughter, today and she feels resident is doing well and does not have concerns.     Past Medical and Surgical History reviewed in Epic today.    MEDICATIONS:  Current Outpatient Medications   Medication Sig Dispense Refill     acetaminophen (TYLENOL) 325 MG tablet Take 650 mg by mouth daily as needed for mild pain       albuterol (PROAIR HFA/PROVENTIL HFA/VENTOLIN HFA) 108 (90 Base) MCG/ACT inhaler Inhale 2 puffs into the lungs every 4 hours as needed for shortness of breath / dyspnea or wheezing       ASPIRIN ADULT LOW STRENGTH 81 MG EC tablet TAKE 1 TABLET BY MOUTH ONCE DAILY 30 tablet 11     bacitracin 500 UNIT/GM OINT Apply topically daily       escitalopram (LEXAPRO) 10 MG tablet Take 10 mg by mouth daily       estradiol (ESTRACE) 0.5 MG tablet TAKE ONE-HALF TABLET (0.25MG) BY MOUTH ONCE DAILY 30 tablet 11     fluticasone-salmeterol (ADVAIR DISKUS) 250-50 MCG/DOSE diskus inhaler Inhale 1 puff into the lungs 2 times daily        ipratropium - albuterol 0.5 mg/2.5 mg/3 mL (DUONEB) 0.5-2.5 (3) MG/3ML neb solution Take 1 vial by nebulization 2 times daily AND BID PRN        levETIRAcetam (KEPPRA) 250 MG tablet TAKE 1 TABLET BY MOUTH ONCE DAILY 30 tablet 11     melatonin 3 MG tablet TAKE 1 TABLET BY MOUTH ONCE DAILY 30 tablet 11     Nutritional Supplements (ENSURE PLUS) LIQD Take by mouth 2 times daily       polyethylene glycol (MIRALAX/GLYCOLAX) packet Take 17 g by mouth daily       propranolol (INDERAL) 20 MG tablet TAKE 1 TABLET BY MOUTH TWICE DAILY 60 tablet 11     rosuvastatin (CRESTOR) 20 MG tablet TAKE 1 TABLET BY MOUTH ONCE DAILY 30 tablet 11     Sennosides (EQ NATURAL VEGETABLE LAXATIVE PO) Take 8.6 mg by mouth daily as needed       tiotropium (SPIRIVA) 18 MCG capsule Inhale 1 puff into the lungs daily        REVIEW OF SYSTEMS:  Limited secondary to cognitive impairment but today pt reports no complaints of pain.     Objective:  /62   Pulse 76   Temp 97.6  F (36.4  C)   Resp 18   Wt 61.2 kg (135 lb)   BMI 22.81 kg/m    Exam:  GENERAL APPEARANCE:  Alert, in no distress, pleasant, cooperative.  RESP:  respiratory effort and palpation of chest normal, no chest wall tenderness, no respiratory distress, Lung sounds diminished in bilateral bases otherwise clear, patient is on room air  CV:  Palpation and auscultation of heart done, rate and rhythm regular, no murmur. Edema none in bilateral lower extremities. VASCULAR: chronic color changes  ABDOMEN:  normal bowel sounds, soft, nontender.  M/S:   Gait and station ambulates independently, no tenderness or swelling of the joints; able to move all extremities   SKIN:  Inspection and palpation of skin and subcutaneous tissue: skin warm, dry and intact without rashes. Small scabbed area at tip of nail on left great toe. No redness or drainage.   NEURO: cranial nerves 2-12 grossly intact, no facial asymmetry, no speech deficits and able to follow directions, moves all extremities symmetrically  PSYCH:  insight and judgement intact, memory impaired, affect and mood normal    Labs:   Reviewed in care  everywhere    ASSESSMENT/PLAN:  (J44.9) Chronic obstructive pulmonary disease, unspecified COPD type (H)  Comment:  1/2-1 PPD for 60+ years. cough has subsided. Given age and cognitive impairment, nebulizers may be easier and ensure that she is getting all medications inhaled.   Plan: guafenesis 600 mg q12h BID PRN.   -- continue with advair 1 puff BID  -- continue with spiriva 1 puff daily  --duonebs q4h PRN     (I48.2) Chronic atrial fibrillation (H)  Comment: not on AC given falls risk and cognitive impairment. Regular rhythm today with controlled rate. Is on propranolol for tremors which is in turn controlling rate.   Plan: continue with propranolol 20 mg BID     (F32.1) Current moderate episode of major depressive disorder, unspecified whether recurrent (H)  Comment: sleepy this afternoon, seems to be settling in.   Plan: Continue lexapro 10 mg daily     (R41.89) Cognitive impairment  Comment: SLUMS  and ACL 3.8 during TCU stay; repeat ACL recently 3.8/5.8.   Plan: continue with memory care assisted living for assistance with cares, meals and medications.   -- follow up with neurology as scheduled at the end of April.      (X30.034) Pain in toe of left foot  Comment: no s/sx of infection. Evaluated by podiatry and had an ulcer underneath long toenail. Staff has used bacitracin and appears to be healing.   Plan:Tylenol for comfort.   --bacitracin daily until healed.     Electronically signed by:  CYNDI Miramontes CNP       Face to Face and Medical Necessity Statement for DME Provider visit    Demographic Information on Zoraida Olivarez:  Gender: female  : 1934  [facility]  [address] [facility] [address]  None (home) [phone] (work)    Medical Record: [id number]  Social Security Number: [id number]  Primary Care Provider: Lynn Gallagher  Insurance: Payor: MEDICARE / Plan: MEDICARE / Product Type: Medicare /     HPI:   Zoraida Olivarez is a 84 year  old  (4/11/1934), who is being seen today for a face to face provider visit at [facility]; medical necessity statement for DME included. This patient requires the following:  DME Ordered and Medical Necessity Statement   Nebulizer:  for duoneb medication  Pt needing above DME with expected length of need of lifetime use due to medical necessity associated with following diagnosis:     Chronic obstructive pulmonary disease, unspecified COPD type (H)    PMH   has a past medical history of Cerebral aneurysm, Chronic obstructive lung disease (H), Hearing loss, sensorineural, combined types, History of shingles, Hyperlipidemia, Ileitis, Incomplete RBBB, Major depression, Memory loss, PAD (peripheral artery disease) (H), Polyp of colon, adenomatous, Skin cancer of arm, Tobacco abuse, and Tremor, essential.    ROS:  Please see above    EXAM  Vitals: /62   Pulse 76   Temp 97.6  F (36.4  C)   Resp 18   Wt 61.2 kg (135 lb)   BMI 22.81 kg/m  ;BMI= Body mass index is 22.81 kg/m .  Please see above    ASSESSMENT/PLAN:  1. Chronic obstructive pulmonary disease, unspecified COPD type (H)    2. Chronic atrial fibrillation (H)    3. Current moderate episode of major depressive disorder, unspecified whether recurrent (H)    4. Cognitive impairment    5. Pain in toe of left foot      Orders:  1. Facility staff/TC to contact DME company to get their order form for provider to fill out    ELECTRONICALLY SIGNED BY GILDARDO CERTIFIED PROVIDER:  CYNDI Miramontes CNP   NPI: [id number]  [facility]  [address] [place] [address], [address]  [place] [address]

## 2019-03-29 NOTE — LETTER
3/29/2019        RE: Zoraida Olivarez  [facility] N[facility]  [address] [facility] [address]        [facility]  [place] Medical Record Number:  [id number]  [facility] where encounter [facility] place[facility] [[id number][facility]  Chief Complaint   Patient presents with     RECHECK       HPI:    Zoraida Olivarez  is a 84 year old (4/11/1934), who is being seen today for an episodic care visit.  HPI information obtained from: facility chart records, facility staff, patient report, [facility] chart review and family/first contact Nava report.     Resident visited today for a follow up visit. She appears to be settling in and adjusting to new setting. Staff does not have concerns and note that her cough has subsided. Resident denies chest pain, shortness of breath or discomfort. Denies pain to the toe. Feels tired today, believes she slept well last night. Spoke with Nava, daughter, today and she feels resident is doing well and does not have concerns.     Past Medical and Surgical History reviewed in Epic today.    MEDICATIONS:  Current Outpatient Medications   Medication Sig Dispense Refill     acetaminophen (TYLENOL) 325 MG tablet Take 650 mg by mouth daily as needed for mild pain       albuterol (PROAIR HFA/PROVENTIL HFA/VENTOLIN HFA) 108 (90 Base) MCG/ACT inhaler Inhale 2 puffs into the lungs every 4 hours as needed for shortness of breath / dyspnea or wheezing       ASPIRIN ADULT LOW STRENGTH 81 MG EC tablet TAKE 1 TABLET BY MOUTH ONCE DAILY 30 tablet 11     bacitracin 500 UNIT/GM OINT Apply topically daily       escitalopram (LEXAPRO) 10 MG tablet Take 10 mg by mouth daily       estradiol (ESTRACE) 0.5 MG tablet TAKE ONE-HALF TABLET (0.25MG) BY MOUTH ONCE DAILY 30 tablet 11     fluticasone-salmeterol (ADVAIR DISKUS) 250-50 MCG/DOSE diskus inhaler Inhale 1 puff into the lungs 2 times daily        ipratropium - albuterol 0.5  mg/2.5 mg/3 mL (DUONEB) 0.5-2.5 (3) MG/3ML neb solution Take 1 vial by nebulization 2 times daily AND BID PRN       levETIRAcetam (KEPPRA) 250 MG tablet TAKE 1 TABLET BY MOUTH ONCE DAILY 30 tablet 11     melatonin 3 MG tablet TAKE 1 TABLET BY MOUTH ONCE DAILY 30 tablet 11     Nutritional Supplements (ENSURE PLUS) LIQD Take by mouth 2 times daily       polyethylene glycol (MIRALAX/GLYCOLAX) packet Take 17 g by mouth daily       propranolol (INDERAL) 20 MG tablet TAKE 1 TABLET BY MOUTH TWICE DAILY 60 tablet 11     rosuvastatin (CRESTOR) 20 MG tablet TAKE 1 TABLET BY MOUTH ONCE DAILY 30 tablet 11     Sennosides (EQ NATURAL VEGETABLE LAXATIVE PO) Take 8.6 mg by mouth daily as needed       tiotropium (SPIRIVA) 18 MCG capsule Inhale 1 puff into the lungs daily        REVIEW OF SYSTEMS:  Limited secondary to cognitive impairment but today pt reports no complaints of pain.     Objective:  /62   Pulse 76   Temp 97.6  F (36.4  C)   Resp 18   Wt 61.2 kg (135 lb)   BMI 22.81 kg/m     Exam:  GENERAL APPEARANCE:  Alert, in no distress, pleasant, cooperative.  RESP:  respiratory effort and palpation of chest normal, no chest wall tenderness, no respiratory distress, Lung sounds diminished in bilateral bases otherwise clear, patient is on room air  CV:  Palpation and auscultation of heart done, rate and rhythm regular, no murmur. Edema none in bilateral lower extremities. VASCULAR: chronic color changes  ABDOMEN:  normal bowel sounds, soft, nontender.  M/S:   Gait and station ambulates independently, no tenderness or swelling of the joints; able to move all extremities   SKIN:  Inspection and palpation of skin and subcutaneous tissue: skin warm, dry and intact without rashes. Small scabbed area at tip of nail on left great toe. No redness or drainage.   NEURO: cranial nerves 2-12 grossly intact, no facial asymmetry, no speech deficits and able to follow directions, moves all extremities symmetrically  PSYCH:  insight and  judgement intact, memory impaired, affect and mood normal    Labs:   Reviewed in care everywhere    ASSESSMENT/PLAN:  (J44.9) Chronic obstructive pulmonary disease, unspecified COPD type (H)  Comment:  1/2-1 PPD for 60+ years.  cough has subsided. Given age and cognitive impairment, nebulizers may be easier and ensure that she is getting all medications inhaled.   Plan: guafenesis 600 mg q12h BID PRN.   -- continue with advair 1 puff BID  -- continue with spiriva 1 puff daily  --duonebs q4h PRN     (I48.2) Chronic atrial fibrillation (H)  Comment:  not on AC given falls risk and cognitive impairment. Regular rhythm today with controlled rate. Is on propranolol for tremors which is in turn controlling rate.   Plan: continue with propranolol 20 mg BID     (F32.1) Current moderate episode of major depressive disorder, unspecified whether recurrent (H)  Comment:  sleepy this afternoon, seems to be settling in.   Plan:  Continue lexapro 10 mg daily     (R41.89) Cognitive impairment  Comment: SLUMS  and ACL 3.8 during TCU stay; repeat ACL recently 3.8/5.8.   Plan: continue with memory care assisted living for assistance with cares, meals and medications.   -- follow up with neurology as scheduled at the end of April.      (R35.833) Pain in toe of left foot  Comment: no s/sx of infection. Evaluated by podiatry and had an ulcer underneath long toenail. Staff has used bacitracin and appears to be healing.   Plan:Tylenol for comfort.   --bacitracin daily until healed.     Electronically signed by:  CYNDI Miramontes CNP       Face to Face and Medical Necessity Statement for DME Provider visit    Demographic Information on Zoraida Olivarez:  Gender: female  : 1934  [facility]  [address] [facility] [address]  None (home) [phone] (work)    Medical Record: [id number]  Social Security Number: [id number]  Primary Care Provider: Lynn Gallagher  Insurance: Payor: MEDICARE /  Plan: MEDICARE / Product Type: Medicare /     HPI:   Zoraida Olivarez is a 84 year old  (4/11/1934), who is being seen today for a face to face provider visit at [facility]; medical necessity statement for DME included. This patient requires the following:  DME Ordered and Medical Necessity Statement   Nebulizer:  for duoneb medication  Pt needing above DME with expected length of need of lifetime use due to medical necessity associated with following diagnosis:     Chronic obstructive pulmonary disease, unspecified COPD type (H)    PMH   has a past medical history of Cerebral aneurysm, Chronic obstructive lung disease (H), Hearing loss, sensorineural, combined types, History of shingles, Hyperlipidemia, Ileitis, Incomplete RBBB, Major depression, Memory loss, PAD (peripheral artery disease) (H), Polyp of colon, adenomatous, Skin cancer of arm, Tobacco abuse, and Tremor, essential.    ROS:  Please see above    EXAM  Vitals: /62   Pulse 76   Temp 97.6  F (36.4  C)   Resp 18   Wt 61.2 kg (135 lb)   BMI 22.81 kg/m   ;BMI= Body mass index is 22.81 kg/m .  Please see above    ASSESSMENT/PLAN:  1. Chronic obstructive pulmonary disease, unspecified COPD type (H)    2. Chronic atrial fibrillation (H)    3. Current moderate episode of major depressive disorder, unspecified whether recurrent (H)    4. Cognitive impairment    5. Pain in toe of left foot      Orders:  1. Facility staff/TC to contact DME company to get their order form for provider to fill out    ELECTRONICALLY SIGNED BY GILDARDO CERTIFIED PROVIDER:  CYNDI Miramontes CNP   NPI: [id number]  [facility]  [address] [place] [address], [address]  [place] [address]              Sincerely,        CYNDI Miramontes CNP

## 2019-04-21 ENCOUNTER — RECORDS - HEALTHEAST (OUTPATIENT)
Dept: LAB | Facility: CLINIC | Age: 84
End: 2019-04-21

## 2019-04-21 ENCOUNTER — TRANSFERRED RECORDS (OUTPATIENT)
Dept: HEALTH INFORMATION MANAGEMENT | Facility: CLINIC | Age: 84
End: 2019-04-21

## 2019-04-21 LAB
ALBUMIN UR-MCNC: ABNORMAL MG/DL
AMORPH CRY #/AREA URNS HPF: ABNORMAL /[HPF]
APPEARANCE UR: ABNORMAL
BACTERIA #/AREA URNS HPF: ABNORMAL HPF
BILIRUB UR QL STRIP: ABNORMAL
CAOX CRY #/AREA URNS HPF: PRESENT /[HPF]
COLOR UR AUTO: ABNORMAL
GLUCOSE UR STRIP-MCNC: NEGATIVE MG/DL
HGB UR QL STRIP: NEGATIVE
KETONES UR STRIP-MCNC: NEGATIVE MG/DL
LEUKOCYTE ESTERASE UR QL STRIP: NEGATIVE
NITRATE UR QL: POSITIVE
PH UR STRIP: 6 [PH] (ref 4.5–8)
RBC #/AREA URNS AUTO: ABNORMAL HPF
SP GR UR STRIP: 1.03 (ref 1–1.03)
SQUAMOUS #/AREA URNS AUTO: ABNORMAL LPF
UROBILINOGEN UR STRIP-ACNC: ABNORMAL
WBC #/AREA URNS AUTO: ABNORMAL HPF

## 2019-04-22 ENCOUNTER — TRANSFERRED RECORDS (OUTPATIENT)
Dept: HEALTH INFORMATION MANAGEMENT | Facility: CLINIC | Age: 84
End: 2019-04-22

## 2019-04-23 ENCOUNTER — ASSISTED LIVING VISIT (OUTPATIENT)
Dept: GERIATRICS | Facility: CLINIC | Age: 84
End: 2019-04-23
Payer: MEDICARE

## 2019-04-23 VITALS
HEART RATE: 70 BPM | WEIGHT: 133 LBS | SYSTOLIC BLOOD PRESSURE: 179 MMHG | RESPIRATION RATE: 20 BRPM | OXYGEN SATURATION: 87 % | DIASTOLIC BLOOD PRESSURE: 95 MMHG | TEMPERATURE: 97.6 F | BODY MASS INDEX: 22.48 KG/M2

## 2019-04-23 DIAGNOSIS — R13.10 DYSPHAGIA, UNSPECIFIED TYPE: ICD-10-CM

## 2019-04-23 DIAGNOSIS — N39.0 URINARY TRACT INFECTION WITHOUT HEMATURIA, SITE UNSPECIFIED: Primary | ICD-10-CM

## 2019-04-23 DIAGNOSIS — I10 BENIGN ESSENTIAL HYPERTENSION: ICD-10-CM

## 2019-04-23 DIAGNOSIS — R41.89 COGNITIVE IMPAIRMENT: ICD-10-CM

## 2019-04-23 DIAGNOSIS — J44.9 CHRONIC OBSTRUCTIVE PULMONARY DISEASE, UNSPECIFIED COPD TYPE (H): ICD-10-CM

## 2019-04-23 DIAGNOSIS — I48.20 CHRONIC ATRIAL FIBRILLATION (H): ICD-10-CM

## 2019-04-23 RX ORDER — NITROFURANTOIN MACROCRYSTAL 100 MG
100 CAPSULE ORAL 2 TIMES DAILY
COMMUNITY
Start: 2019-04-22 | End: 2019-05-08

## 2019-04-23 NOTE — LETTER
"    4/23/2019        RE: Zoraida Olivarez  [facility] Of Nava Bruce  [address] [facility] [address]        [facility]  [facility] Medical Record Number:  [id number]  [facility] [[id number][facility]  Chief Complaint   Patient presents with     RECHECK       HPI:    Zoraida Olivarez  is a 85 year old (4/11/1934), who is being seen today for an episodic care visit.  HPI information obtained from: facility chart records, facility staff, patient report, [facility] chart review, Care Everywhere [facility] chart review and family/first contact Nava report. Today's concern is:  1. Urinary tract infection without hematuria, site unspecified    2. Benign essential hypertension    3. Chronic obstructive pulmonary disease, unspecified COPD type (H)    4. Chronic atrial fibrillation (H)    5. Dysphagia, unspecified type    6. Cognitive impairment      Resident visited this afternoon while finishing up desert after lunch. She denies difficulty swallowing but son and daughter report that she pocketed ham over the weekend for up to an hour after mealtime. Denies pain and when asked if painful urination, \"why does everyone keep asking me that?\" had a fever over the weekend, UA positive and started on nitrofurantoin this morning. Currently ambulating independently without the use of an assistive device.   /95, HR 73, oxygen 92%, RR 16.    Past Medical and Surgical History reviewed in Epic today.    MEDICATIONS:  Current Outpatient Medications   Medication Sig Dispense Refill     acetaminophen (TYLENOL) 325 MG tablet Take 650 mg by mouth daily as needed for mild pain       albuterol (PROAIR HFA/PROVENTIL HFA/VENTOLIN HFA) 108 (90 Base) MCG/ACT inhaler Inhale 2 puffs into the lungs every 4 hours as needed for shortness of breath / dyspnea or wheezing       ASPIRIN ADULT LOW STRENGTH 81 MG EC tablet TAKE 1 TABLET BY " MOUTH ONCE DAILY 30 tablet 11     bacitracin 500 UNIT/GM OINT Apply topically daily       escitalopram (LEXAPRO) 10 MG tablet TAKE 1 TABLET BY MOUTH EVERY MORNING 30 tablet 11     estradiol (ESTRACE) 0.5 MG tablet TAKE ONE-HALF TABLET (0.25MG) BY MOUTH ONCE DAILY 30 tablet 11     fluticasone-salmeterol (ADVAIR) 250-50 MCG/DOSE inhaler INHALE 1 PUFF BY MOUTH TWICE DAILY 1 Inhaler 11     ipratropium - albuterol 0.5 mg/2.5 mg/3 mL (DUONEB) 0.5-2.5 (3) MG/3ML neb solution Take 1 vial by nebulization every 4 hours as needed        levETIRAcetam (KEPPRA) 250 MG tablet TAKE 1 TABLET BY MOUTH ONCE DAILY 30 tablet 11     melatonin 3 MG tablet TAKE 1 TABLET BY MOUTH ONCE DAILY 30 tablet 11     nitroFURantoin macrocrystal (MACRODANTIN) 100 MG capsule Take 100 mg by mouth 2 times daily       Nutritional Supplements (ENSURE PLUS) LIQD Take by mouth 2 times daily       polyethylene glycol (MIRALAX/GLYCOLAX) packet Take 17 g by mouth daily       propranolol (INDERAL) 20 MG tablet TAKE 1 TABLET BY MOUTH TWICE DAILY 60 tablet 11     rosuvastatin (CRESTOR) 20 MG tablet TAKE 1 TABLET BY MOUTH ONCE DAILY 30 tablet 11     Sennosides (EQ NATURAL VEGETABLE LAXATIVE PO) Take 8.6 mg by mouth daily as needed       SPIRIVA HANDIHALER 18 MCG inhaled capsule INHALE THE CONTENTS OF ONE CAPSULE ONCE DAILY USING HANDIHALER DEVICE 30 capsule 11     REVIEW OF SYSTEMS:  Limited secondary to cognitive impairment but today pt reports reports no complaints of pain or painful urination    Objective:  BP (!) 179/95   Pulse 70   Temp 97.6  F (36.4  C)   Resp 20   Wt 60.3 kg (133 lb)   SpO2 (!) 87%   BMI 22.48 kg/m     Exam:  GENERAL APPEARANCE:  Alert, in no distress, pleasant, cooperative  EYES: no discharge or mattering on lids or lashes noted  ENT:  moist mucous membranes, hearing acuity intact  NECK: supple, symmetrical  RESP: no respiratory distress, Lung sounds clear, diminished in base, patient is on room air  CV:  rate and rhythm regular, no  murmur. Edema none in bilateral lower extremities. ABDOMEN: normal bowel sounds, soft, nontender.  M/S:   Gait and station: ambulated independently, no tenderness or swelling of the joints; able to move all extremities   SKIN:  Inspection and palpation of skin and subcutaneous tissue: skin warm, dry, without rashes  NEURO: no facial asymmetry, no speech deficits and able to follow directions, moves all extremities symmetrically  PSYCH:  insight, judgement, and memory impaired affect and mood normal    Labs:   Reviewed in care everywhere    ASSESSMENT/PLAN:  (N39.0) Urinary tract infection  Comment: febrile to 100.0 over the weekend but no further fevers since that time. Started nitrofurantoin this morning.   Plan: continue with nitrofurantoin 100 mg BID x 7 day course--last day on 4/28/19    (I10) essential hypertension  Comment: Systolic BP's of 190 this morning on 2 different reads. Was 175/95 during visit this afternoon. Initially wanted to start an antihypertensive agent but BP noted to decrease to 140,s then 110 on 4/24 so will hold off for now.   Plan: monitor blood pressure twice weekly. Will start an antihypertensive if systolic BP consistently >150.      (R13.10) dysphagia  Comment: currently on mechanical soft diet but still pocketing food. Had ham over the weekend that didn't appear to be mechanical soft and pineapple during the visit which may not be appropriate for her to have. Has been working with SLP whom has recommended a swallow study. Currently no s/sx of aspiration pneumonia.   Plan: swallow study ordered and to be scheduled.   --work with staff at [facility] to ensure Dylan is receiving mechanically altered foods.     (J44.9) Chronic obstructive pulmonary disease, unspecified COPD type (H)  Comment:  1/2-1 PPD for 60+ years. No s/sx of exacerbation on exam.   Plan:   -- continue with advair 1 puff BID  -- continue with spiriva 1 puff daily  --duonebs q4h PRN     (I48.2) Chronic atrial  fibrillation (H)  Comment: not on AC given falls risk and cognitive impairment. Regular rhythm today with controlled rate. Is on propranolol for tremors which is in turn controlling rate. HR 73 today.  Plan: continue with propranolol 20 mg BID     (F32.1) Current moderate episode of major depressive disorder, unspecified whether recurrent (H)  Comment: settling in well. Good mood and spirits today. Participates in facility activities.   Plan: Continue lexapro 10 mg daily     (R41.89) Cognitive impairment  Comment: SLUMS  and ACL 3.8 during TCU stay; repeat ACL recently 3.8/5.8. Appears more clear today than in the recent months. Saw neurology this week whom will be reviewing recent scans.   Plan: continue with memory care assisted living for assistance with cares, meals and medications.   --follow up with neurology as scheduled in May.   .     Total time with an established patient visit in the assisted livin minutes including discussions about the POC and care coordination with the patient, family, Amos and first contact, Nava. Greater than 50% of total time spent with counseling and coordinating care due to need for swallow evaluation, UTI, HTN.     Electronically signed by:  CYNDI Miramontes CNP           Sincerely,        CYNDI Miramontes CNP

## 2019-04-23 NOTE — PROGRESS NOTES
"[facility]  [place] Medical Record Number:  [id number]  Place of Service where encounter took place:  [facility] ([facility]) [[id number]]  Chief Complaint   Patient presents with     RECHECK       HPI:    Zoraida Olivarez  is a 85 year old (4/11/1934), who is being seen today for an episodic care visit.  HPI information obtained from: facility chart records, facility staff, patient report, [facility] chart review, [facility] chart review and family/first contact Nava report. Today's concern is:  1. Urinary tract infection without hematuria, site unspecified    2. Benign essential hypertension    3. Chronic obstructive pulmonary disease, unspecified COPD type (H)    4. Chronic atrial fibrillation (H)    5. Dysphagia, unspecified type    6. Cognitive impairment      Resident visited this afternoon while finishing up desert after lunch. She denies difficulty swallowing but son and daughter report that she pocketed ham over the weekend for up to an hour after mealtime. Denies pain and when asked if painful urination, \"why does everyone keep asking me that?\" had a fever over the weekend, UA positive and started on nitrofurantoin this morning. Currently ambulating independently without the use of an assistive device.   /95, HR 73, oxygen 92%, RR 16.    Past Medical and Surgical History reviewed in Epic today.    MEDICATIONS:  Current Outpatient Medications   Medication Sig Dispense Refill     acetaminophen (TYLENOL) 325 MG tablet Take 650 mg by mouth daily as needed for mild pain       albuterol (PROAIR HFA/PROVENTIL HFA/VENTOLIN HFA) 108 (90 Base) MCG/ACT inhaler Inhale 2 puffs into the lungs every 4 hours as needed for shortness of breath / dyspnea or wheezing       ASPIRIN ADULT LOW STRENGTH 81 MG EC tablet TAKE 1 TABLET BY MOUTH ONCE DAILY 30 tablet 11     bacitracin 500 UNIT/GM OINT Apply topically daily       escitalopram (LEXAPRO) 10 MG " tablet TAKE 1 TABLET BY MOUTH EVERY MORNING 30 tablet 11     estradiol (ESTRACE) 0.5 MG tablet TAKE ONE-HALF TABLET (0.25MG) BY MOUTH ONCE DAILY 30 tablet 11     fluticasone-salmeterol (ADVAIR) 250-50 MCG/DOSE inhaler INHALE 1 PUFF BY MOUTH TWICE DAILY 1 Inhaler 11     ipratropium - albuterol 0.5 mg/2.5 mg/3 mL (DUONEB) 0.5-2.5 (3) MG/3ML neb solution Take 1 vial by nebulization every 4 hours as needed        levETIRAcetam (KEPPRA) 250 MG tablet TAKE 1 TABLET BY MOUTH ONCE DAILY 30 tablet 11     melatonin 3 MG tablet TAKE 1 TABLET BY MOUTH ONCE DAILY 30 tablet 11     nitroFURantoin macrocrystal (MACRODANTIN) 100 MG capsule Take 100 mg by mouth 2 times daily       Nutritional Supplements (ENSURE PLUS) LIQD Take by mouth 2 times daily       polyethylene glycol (MIRALAX/GLYCOLAX) packet Take 17 g by mouth daily       propranolol (INDERAL) 20 MG tablet TAKE 1 TABLET BY MOUTH TWICE DAILY 60 tablet 11     rosuvastatin (CRESTOR) 20 MG tablet TAKE 1 TABLET BY MOUTH ONCE DAILY 30 tablet 11     Sennosides (EQ NATURAL VEGETABLE LAXATIVE PO) Take 8.6 mg by mouth daily as needed       SPIRIVA HANDIHALER 18 MCG inhaled capsule INHALE THE CONTENTS OF ONE CAPSULE ONCE DAILY USING HANDIHALER DEVICE 30 capsule 11     REVIEW OF SYSTEMS:  Limited secondary to cognitive impairment but today pt reports reports no complaints of pain or painful urination    Objective:  BP (!) 179/95   Pulse 70   Temp 97.6  F (36.4  C)   Resp 20   Wt 60.3 kg (133 lb)   SpO2 (!) 87%   BMI 22.48 kg/m    Exam:  GENERAL APPEARANCE:  Alert, in no distress, pleasant, cooperative  EYES: no discharge or mattering on lids or lashes noted  ENT:  moist mucous membranes, hearing acuity intact  NECK: supple, symmetrical  RESP: no respiratory distress, Lung sounds clear, diminished in base, patient is on room air  CV:  rate and rhythm regular, no murmur. Edema none in bilateral lower extremities. ABDOMEN: normal bowel sounds, soft, nontender.  M/S:   Gait and  station: ambulated independently, no tenderness or swelling of the joints; able to move all extremities   SKIN:  Inspection and palpation of skin and subcutaneous tissue: skin warm, dry, without rashes  NEURO: no facial asymmetry, no speech deficits and able to follow directions, moves all extremities symmetrically  PSYCH:  insight, judgement, and memory impaired affect and mood normal    Labs:   Reviewed in care everywhere    ASSESSMENT/PLAN:  (N39.0) Urinary tract infection  Comment: febrile to 100.0 over the weekend but no further fevers since that time. Started nitrofurantoin this morning.   Plan: continue with nitrofurantoin 100 mg BID x 7 day course--last day on 4/28/19    (I10) essential hypertension  Comment: Systolic BP's of 190 this morning on 2 different reads. Was 175/95 during visit this afternoon. Initially wanted to start an antihypertensive agent but BP noted to decrease to 140,s then 110 on 4/24 so will hold off for now.   Plan: monitor blood pressure twice weekly. Will start an antihypertensive if systolic BP consistently >150.      (R13.10) dysphagia  Comment: currently on mechanical soft diet but still pocketing food. Had ham over the weekend that didn't appear to be mechanical soft and pineapple during the visit which may not be appropriate for her to have. Has been working with SLP whom has recommended a swallow study. Currently no s/sx of aspiration pneumonia.   Plan: swallow study ordered and to be scheduled.   --work with staff at [facility] to ensure Dylan is receiving mechanically altered foods.     (J44.9) Chronic obstructive pulmonary disease, unspecified COPD type (H)  Comment:  1/2-1 PPD for 60+ years. No s/sx of exacerbation on exam.   Plan:   -- continue with advair 1 puff BID  -- continue with spiriva 1 puff daily  --duonebs q4h PRN     (I48.2) Chronic atrial fibrillation (H)  Comment: not on AC given falls risk and cognitive impairment. Regular rhythm today with controlled rate. Is  on propranolol for tremors which is in turn controlling rate. HR 73 today.  Plan: continue with propranolol 20 mg BID     (F32.1) Current moderate episode of major depressive disorder, unspecified whether recurrent (H)  Comment: settling in well. Good mood and spirits today. Participates in facility activities.   Plan: Continue lexapro 10 mg daily     (R41.89) Cognitive impairment  Comment: SLUMS  and ACL 3.8 during TCU stay; repeat ACL recently 3.8/5.8. Appears more clear today than in the recent months. Saw neurology this week whom will be reviewing recent scans.   Plan: continue with memory care assisted living for assistance with cares, meals and medications.   --follow up with neurology as scheduled in May.   .     Total time with an established patient visit in the assisted livin minutes including discussions about the POC and care coordination with the patient, family, Amos and first contact, Nava. Greater than 50% of total time spent with counseling and coordinating care due to need for swallow evaluation, UTI, HTN.     Electronically signed by:  CYNDI Miramontes CNP

## 2019-04-24 LAB — BACTERIA SPEC CULT: ABNORMAL

## 2019-04-30 ENCOUNTER — ASSISTED LIVING VISIT (OUTPATIENT)
Dept: GERIATRICS | Facility: CLINIC | Age: 84
End: 2019-04-30
Payer: MEDICARE

## 2019-04-30 ENCOUNTER — TRANSFERRED RECORDS (OUTPATIENT)
Dept: HEALTH INFORMATION MANAGEMENT | Facility: CLINIC | Age: 84
End: 2019-04-30

## 2019-04-30 VITALS
SYSTOLIC BLOOD PRESSURE: 148 MMHG | RESPIRATION RATE: 20 BRPM | DIASTOLIC BLOOD PRESSURE: 84 MMHG | OXYGEN SATURATION: 87 % | BODY MASS INDEX: 22.48 KG/M2 | HEART RATE: 61 BPM | TEMPERATURE: 98.8 F | WEIGHT: 133 LBS

## 2019-04-30 DIAGNOSIS — J44.9 CHRONIC OBSTRUCTIVE PULMONARY DISEASE, UNSPECIFIED COPD TYPE (H): Primary | ICD-10-CM

## 2019-04-30 DIAGNOSIS — I10 BENIGN ESSENTIAL HYPERTENSION: ICD-10-CM

## 2019-04-30 DIAGNOSIS — R53.81 MALAISE: ICD-10-CM

## 2019-04-30 DIAGNOSIS — N39.0 URINARY TRACT INFECTION WITHOUT HEMATURIA, SITE UNSPECIFIED: ICD-10-CM

## 2019-04-30 DIAGNOSIS — R13.10 DYSPHAGIA, UNSPECIFIED TYPE: ICD-10-CM

## 2019-04-30 NOTE — PROGRESS NOTES
"[facility]  [place] Medical Record Number:  [id number]  Place of Service where encounter took place:  [facility] ([facility]) [[id number]]  Chief Complaint   Patient presents with     RECHECK       HPI:    Zoraida Olivarez  is a 85 year old (4/11/1934), who is being seen today for an episodic care visit.  HPI information obtained from: facility chart records, facility staff, patient report and [facility] chart review. Today's concern is:  1. Chronic obstructive pulmonary disease, unspecified COPD type (H)    2. Malaise    3. Dysphagia, unspecified type    4. Benign essential hypertension    5. Urinary tract infection without hematuria, site unspecified       Resident visited today while in her room lying on the bed. Reports that she doesn't feel well but unable to explain why. Staff does not have concerns at this time. Spoke with daughter who reports Dylan didn't feel well last night and said, \"I just want to get into bed.\" Daughter noted to continue to be pocketing food and has been unable to tolerate the chopped meats.     VITALS TODAY  /62, HR 75, oxygen 85% on room air while at rest.     Past Medical and Surgical History reviewed in Epic today.    MEDICATIONS:  Current Outpatient Medications   Medication Sig Dispense Refill     acetaminophen (TYLENOL) 325 MG tablet Take 650 mg by mouth daily as needed for mild pain       albuterol (PROAIR HFA/PROVENTIL HFA/VENTOLIN HFA) 108 (90 Base) MCG/ACT inhaler Inhale 2 puffs into the lungs every 4 hours as needed for shortness of breath / dyspnea or wheezing       ASPIRIN ADULT LOW STRENGTH 81 MG EC tablet TAKE 1 TABLET BY MOUTH ONCE DAILY 30 tablet 11     escitalopram (LEXAPRO) 10 MG tablet TAKE 1 TABLET BY MOUTH EVERY MORNING 30 tablet 11     estradiol (ESTRACE) 0.5 MG tablet TAKE ONE-HALF TABLET (0.25MG) BY MOUTH ONCE DAILY 30 tablet 11     fluticasone-salmeterol (ADVAIR) 250-50 MCG/DOSE inhaler INHALE 1 " PUFF BY MOUTH TWICE DAILY 1 Inhaler 11     ipratropium - albuterol 0.5 mg/2.5 mg/3 mL (DUONEB) 0.5-2.5 (3) MG/3ML neb solution Take 1 vial by nebulization every 4 hours as needed (AND BID SCHEDULED)        levETIRAcetam (KEPPRA) 250 MG tablet TAKE 1 TABLET BY MOUTH ONCE DAILY 30 tablet 11     melatonin 3 MG tablet TAKE 1 TABLET BY MOUTH ONCE DAILY 30 tablet 11     Nutritional Supplements (ENSURE PLUS) LIQD Take by mouth 2 times daily       polyethylene glycol (MIRALAX/GLYCOLAX) packet Take 17 g by mouth daily       propranolol (INDERAL) 20 MG tablet TAKE 1 TABLET BY MOUTH TWICE DAILY 60 tablet 11     rosuvastatin (CRESTOR) 20 MG tablet TAKE 1 TABLET BY MOUTH ONCE DAILY 30 tablet 11     Sennosides (EQ NATURAL VEGETABLE LAXATIVE PO) Take 8.6 mg by mouth daily as needed       SPIRIVA HANDIHALER 18 MCG inhaled capsule INHALE THE CONTENTS OF ONE CAPSULE ONCE DAILY USING HANDIHALER DEVICE 30 capsule 11     bacitracin 500 UNIT/GM OINT Apply topically daily       REVIEW OF SYSTEMS:  Limited secondary to cognitive impairment but today pt reports no complaints of pain.    Objective:  /84   Pulse 61   Temp 98.8  F (37.1  C)   Resp 20   Wt 60.3 kg (133 lb)   SpO2 (!) 87%   BMI 22.48 kg/m    Exam:  GENERAL APPEARANCE:  Alert, pleasant, cooperative, tired appearance  EYES: no discharge or mattering on lids or lashes noted  ENT:  dry mucous membranes, hearing acuity intact  RESP: no respiratory distress, Lung sounds clear, diminished in bases, patient is on room air  CV:  rate and rhythm regular, no murmur. Edema none in bilateral lower extremities. ABDOMEN: normal bowel sounds, soft, nontender.  M/S:   Gait and station: ambulates independently and hand held , no tenderness or swelling of the joints; able to move all extremities   SKIN:  warm and dry. Open area on RLE, no surrounding redness or warmth  NEURO: no facial asymmetry, no speech deficits and able to follow directions, moves all extremities  symmetrically  PSYCH:  insight, judgement, and memory impaired affect and mood normal    Labs:   Reviewed in care everywhere     ASSESSMENT/PLAN:  (J44.9) Chronic obstructive pulmonary disease, unspecified COPD type (H)  (primary encounter diagnosis)  Comment: no wheezing on exam but oxygen saturations 85% on room air while at rest today. She became unsteady on her feet so wonder if she has hypoxia with exertion causing that. Will schedule nebs to optimize treatment in hope of better lung function.   Plan:   --Duonebs BID  --oxygen 2 liters per nasal cannula continuously     (R53.81) Malaise  (N39.0) Urinary tract infection without hematuria,  Comment: completed nitrofurantoin yesterday for UTI but has continued malaise and fatigue  Plan: UA to confirm adequately treated.     (R13.10) Dysphagia, unspecified type  Comment: continues to pocket food. At risk for aspiration pneumonia; although no coarse lung sounds, will obtain chest xray to rule out pna given continued malaise.   Plan: chest xray AP/L once today to rule out pna.   --swallow study at , daughter will schedule exam.   --downgrade diet to soft food only per family request. Will adjust after video swallow.     (I10) Benign essential hypertension  Comment: isolated hypertension last week which has since been controlled with reading noted above.   Plan: monitor BP twice weekly.       Total time with an established patient visit in the assisted livin minutes including discussions about the POC and care coordination with the patient and family, Nava. Greater than 50% of total time spent with counseling and coordinating care due to hypoxia, malaise, and dysphagia     Electronically signed by:  CYNDI Miramontes CNP         Face to Face and Medical Necessity Statement for DME Provider visit    Demographic Information on Zoraida Olivarez:  Gender: female  : 1934  [facility] [place] [address] [facility] [address]  [phone]  (home) [phone] (work)    Medical Record: [id number]  Social Security Number: [id number]  Primary Care Provider: Lynn Gallagher  Insurance: Payor: MEDICARE / Plan: MEDICARE / Product Type: Medicare /     HPI:   Zoraida Olivarez is a 85 year old  (4/11/1934), who is being seen today for a face to face provider visit at [facility]; medical necessity statement for DME included. This patient requires the following:  DME Ordered and Medical Necessity Statement   Oxygen: 2 L/min via nasal cannula continuous ; and will need a portable tank. Clinical Documentation: (Obtain documented RA sat with in 2 days of discharge in acute setting or within 30 days of provider visit):  Method 1: Room air at rest: Qualifing sat level is < 88% or below on room air at rest on 4/30/19    Pt needing above DME with expected length of need of lifetime due to medical necessity associated with following diagnosis:     Chronic obstructive pulmonary disease, unspecified COPD type (H)    PMH   has a past medical history of Cerebral aneurysm, Chronic obstructive lung disease (H), Hearing loss, sensorineural, combined types, History of shingles, Hyperlipidemia, Ileitis, Incomplete RBBB, Major depression, Memory loss, PAD (peripheral artery disease) (H), Polyp of colon, adenomatous, Skin cancer of arm, Tobacco abuse, and Tremor, essential.    ROS:  Please see above    EXAM  Vitals: /84   Pulse 61   Temp 98.8  F (37.1  C)   Resp 20   Wt 60.3 kg (133 lb)   SpO2 (!) 87%   BMI 22.48 kg/m  ;BMI= Body mass index is 22.48 kg/m .   Please see above.     ASSESSMENT/PLAN:  1. Chronic obstructive pulmonary disease, unspecified COPD type (H)    2. Malaise    3. Dysphagia, unspecified type    4. Benign essential hypertension    5. Urinary tract infection without hematuria, site unspecified        Orders:  1. Facility staff/TC to contact DME company to get their order form for provider to fill out    ELECTRONICALLY SIGNED BY  GILDARDO CERTIFIED PROVIDER:  CYNDI Miramontes CNP   NPI: [id number]  [facility]  [facility] [address], [address]  [place] [address]

## 2019-04-30 NOTE — LETTER
"    4/30/2019        RE: Zoraida lOivarez  [facility]  [address] [facility] [address]        [facility]  [place] [facility] Record Number:  [id number]  Place of Service where encounter took place:  [facility] ([facility] [[id number]]  Chief Complaint   Patient presents with     RECHECK       HPI:    Zoraida Olivarez  is a 85 year old (4/11/1934), who is being seen today for an episodic care visit.  HPI information obtained from: facility chart records, facility staff, patient report and [facility] chart review. Today's concern is:  1. Chronic obstructive pulmonary disease, unspecified COPD type (H)    2. Malaise    3. Dysphagia, unspecified type    4. Benign essential hypertension    5. Urinary tract infection without hematuria, site unspecified       Resident visited today while in her room lying on the bed. Reports that she doesn't feel well but unable to explain why. Staff does not have concerns at this time. Spoke with daughter who reports Dylan didn't feel well last night and said, \"I just want to get into bed.\" Daughter noted to continue to be pocketing food and has been unable to tolerate the chopped meats.     VITALS TODAY  /62, HR 75, oxygen 85% on room air while at rest.     Past Medical and Surgical History reviewed in Epic today.    MEDICATIONS:  Current Outpatient Medications   Medication Sig Dispense Refill     acetaminophen (TYLENOL) 325 MG tablet Take 650 mg by mouth daily as needed for mild pain       albuterol (PROAIR HFA/PROVENTIL HFA/VENTOLIN HFA) 108 (90 Base) MCG/ACT inhaler Inhale 2 puffs into the lungs every 4 hours as needed for shortness of breath / dyspnea or wheezing       ASPIRIN ADULT LOW STRENGTH 81 MG EC tablet TAKE 1 TABLET BY MOUTH ONCE DAILY 30 tablet 11     escitalopram (LEXAPRO) 10 MG tablet TAKE 1 TABLET BY MOUTH EVERY MORNING 30 tablet 11     estradiol (ESTRACE) 0.5 MG tablet TAKE " ONE-HALF TABLET (0.25MG) BY MOUTH ONCE DAILY 30 tablet 11     fluticasone-salmeterol (ADVAIR) 250-50 MCG/DOSE inhaler INHALE 1 PUFF BY MOUTH TWICE DAILY 1 Inhaler 11     ipratropium - albuterol 0.5 mg/2.5 mg/3 mL (DUONEB) 0.5-2.5 (3) MG/3ML neb solution Take 1 vial by nebulization every 4 hours as needed (AND BID SCHEDULED)        levETIRAcetam (KEPPRA) 250 MG tablet TAKE 1 TABLET BY MOUTH ONCE DAILY 30 tablet 11     melatonin 3 MG tablet TAKE 1 TABLET BY MOUTH ONCE DAILY 30 tablet 11     Nutritional Supplements (ENSURE PLUS) LIQD Take by mouth 2 times daily       polyethylene glycol (MIRALAX/GLYCOLAX) packet Take 17 g by mouth daily       propranolol (INDERAL) 20 MG tablet TAKE 1 TABLET BY MOUTH TWICE DAILY 60 tablet 11     rosuvastatin (CRESTOR) 20 MG tablet TAKE 1 TABLET BY MOUTH ONCE DAILY 30 tablet 11     Sennosides (EQ NATURAL VEGETABLE LAXATIVE PO) Take 8.6 mg by mouth daily as needed       SPIRIVA HANDIHALER 18 MCG inhaled capsule INHALE THE CONTENTS OF ONE CAPSULE ONCE DAILY USING HANDIHALER DEVICE 30 capsule 11     bacitracin 500 UNIT/GM OINT Apply topically daily       REVIEW OF SYSTEMS:  Limited secondary to cognitive impairment but today pt reports no complaints of pain.    Objective:  /84   Pulse 61   Temp 98.8  F (37.1  C)   Resp 20   Wt 60.3 kg (133 lb)   SpO2 (!) 87%   BMI 22.48 kg/m     Exam:  GENERAL APPEARANCE:  Alert, pleasant, cooperative, tired appearance  EYES: no discharge or mattering on lids or lashes noted  ENT:   dry mucous membranes, hearing acuity intact  RESP: no respiratory distress, Lung sounds clear, diminished in bases, patient is on room air  CV:  rate and rhythm regular, no murmur. Edema none in bilateral lower extremities. ABDOMEN: normal bowel sounds, soft, nontender.  M/S:   Gait and station: ambulates independently and hand held , no tenderness or swelling of the joints; able to move all extremities   SKIN:   warm and dry. Open area on RLE, no surrounding redness  or warmth  NEURO: no facial asymmetry, no speech deficits and able to follow directions, moves all extremities symmetrically  PSYCH:  insight, judgement, and memory impaired affect and mood normal    Labs:   Reviewed in care everywhere     ASSESSMENT/PLAN:  (J44.9) Chronic obstructive pulmonary disease, unspecified COPD type (H)  (primary encounter diagnosis)  Comment: no wheezing on exam but oxygen saturations 85% on room air while at rest today. She became unsteady on her feet so wonder if she has hypoxia with exertion causing that. Will schedule nebs to optimize treatment in hope of better lung function.   Plan:   --Duonebs BID  --oxygen 2 liters per nasal cannula continuously     (R53.81) Malaise  (N39.0) Urinary tract infection without hematuria,  Comment: completed nitrofurantoin yesterday for UTI but has continued malaise and fatigue  Plan: UA to confirm adequately treated.     (R13.10) Dysphagia, unspecified type  Comment: continues to pocket food. At risk for aspiration pneumonia; although no coarse lung sounds, will obtain chest xray to rule out pna given continued malaise.   Plan: chest xray AP/L once today to rule out pna.   --swallow study at , daughter will schedule exam.   --downgrade diet to soft food only per family request. Will adjust after video swallow.     (I10) Benign essential hypertension  Comment: isolated hypertension last week which has since been controlled with reading noted above.   Plan: monitor BP twice weekly.       Total time with an established patient visit in the assisted livin minutes including discussions about the POC and care coordination with the patient and family, Nava. Greater than 50% of total time spent with counseling and coordinating care due to hypoxia, malaise, and dysphagia     Electronically signed by:  CYNDI Miramontes CNP         Face to Face and Medical Necessity Statement for DME Provider visit    Demographic Information on Zoraida Pinedo  Sher:  Gender: female  : 1934  [facility]  [address] [facility] [address]  [phone] (home) [phone] (work)    Medical Record: [id number]  Social Security Number: [id number]  Primary Care Provider: Lynn Gallagher  Insurance: Payor: MEDICARE / Plan: MEDICARE / Product Type: Medicare /     HPI:   Zoraida Olivarez is a 85 year old  (1934), who is being seen today for a face to face provider visit at [facility]; medical necessity statement for DME included. This patient requires the following:  DME Ordered and Medical Necessity Statement   Oxygen: 2 L/min via nasal cannula continuous ; and will need a portable tank. Clinical Documentation: (Obtain documented RA sat with in 2 days of discharge in acute setting or within 30 days of provider visit):  Method 1: Room air at rest: Qualifing sat level is < 88% or below on room air at rest on 19    Pt needing above DME with expected length of need of lifetime due to medical necessity associated with following diagnosis:     Chronic obstructive pulmonary disease, unspecified COPD type (H)    PMH   has a past medical history of Cerebral aneurysm, Chronic obstructive lung disease (H), Hearing loss, sensorineural, combined types, History of shingles, Hyperlipidemia, Ileitis, Incomplete RBBB, Major depression, Memory loss, PAD (peripheral artery disease) (H), Polyp of colon, adenomatous, Skin cancer of arm, Tobacco abuse, and Tremor, essential.    ROS:  Please see above    EXAM  Vitals: /84   Pulse 61   Temp 98.8  F (37.1  C)   Resp 20   Wt 60.3 kg (133 lb)   SpO2 (!) 87%   BMI 22.48 kg/m   ;BMI= Body mass index is 22.48 kg/m .   Please see above.     ASSESSMENT/PLAN:  1. Chronic obstructive pulmonary disease, unspecified COPD type (H)    2. Malaise    3. Dysphagia, unspecified type    4. Benign essential hypertension    5. Urinary tract infection without hematuria, site unspecified        Orders:  1.  Facility staff/TC to contact [employer] company to get their order form for provider to fill out    ELECTRONICALLY SIGNED BY PECOS CERTIFIED PROVIDER:  CYNDI Miramontes CNP   NPI: [id number]  [facility]  [address] [place] [address], [address]  [place] [address]            Sincerely,        CYNDI Miramontes CNP

## 2019-05-01 ENCOUNTER — RECORDS - HEALTHEAST (OUTPATIENT)
Dept: LAB | Facility: CLINIC | Age: 84
End: 2019-05-01

## 2019-05-01 ENCOUNTER — TRANSFERRED RECORDS (OUTPATIENT)
Dept: HEALTH INFORMATION MANAGEMENT | Facility: CLINIC | Age: 84
End: 2019-05-01

## 2019-05-01 LAB
ALBUMIN UR-MCNC: ABNORMAL MG/DL
ANION GAP SERPL CALCULATED.3IONS-SCNC: 11 MMOL/L (ref 5–18)
ANION GAP SERPL CALCULATED.3IONS-SCNC: 11 MMOL/L (ref 5–18)
APPEARANCE UR: ABNORMAL
BACTERIA #/AREA URNS HPF: ABNORMAL HPF
BASOPHILS # BLD AUTO: 0.1 THOU/UL (ref 0–0.2)
BASOPHILS NFR BLD AUTO: 1 % (ref 0–2)
BILIRUB UR QL STRIP: NEGATIVE
BUN SERPL-MCNC: 23 MG/DL (ref 8–26)
BUN SERPL-MCNC: 23 MG/DL (ref 8–28)
CALCIUM SERPL-MCNC: 9.6 MG/DL (ref 8.5–10.5)
CALCIUM SERPL-MCNC: 9.6 MG/DL (ref 8.5–10.5)
CHLORIDE BLD-SCNC: 109 MMOL/L (ref 98–107)
CHLORIDE SERPLBLD-SCNC: 109 MMOL/L (ref 98–107)
CO2 SERPL-SCNC: 27 MMOL/L (ref 22–31)
CO2 SERPL-SCNC: 27 MMOL/L (ref 22–31)
COLOR UR AUTO: YELLOW
CREAT SERPL-MCNC: 0.85 MG/DL (ref 0.6–1.1)
CREAT SERPL-MCNC: 0.85 MG/DL (ref 0.6–1.1)
DIFFERENTIAL: ABNORMAL
EOSINOPHIL # BLD AUTO: 0.6 THOU/UL (ref 0–0.4)
EOSINOPHIL NFR BLD AUTO: 6 % (ref 0–6)
ERYTHROCYTE [DISTWIDTH] IN BLOOD BY AUTOMATED COUNT: 13.8 % (ref 11–14.5)
ERYTHROCYTE [DISTWIDTH] IN BLOOD BY AUTOMATED COUNT: 13.8 % (ref 11–14.5)
GFR SERPL CREATININE-BSD FRML MDRD: >60 ML/MIN/1.73M2
GFR SERPL CREATININE-BSD FRML MDRD: >60 ML/MIN/1.73M2
GLUCOSE BLD-MCNC: 92 MG/DL (ref 70–125)
GLUCOSE SERPL-MCNC: 92 MG/DL (ref 70–125)
GLUCOSE UR STRIP-MCNC: NEGATIVE MG/DL
HCT VFR BLD AUTO: 47.9 % (ref 35–47)
HCT VFR BLD AUTO: 47.9 % (ref 35–47)
HEMOGLOBIN: 15.8 G/DL (ref 12–16)
HGB BLD-MCNC: 15.8 G/DL (ref 12–16)
HGB UR QL STRIP: NEGATIVE
HYALINE CASTS #/AREA URNS LPF: ABNORMAL LPF
KETONES UR STRIP-MCNC: NEGATIVE MG/DL
LEUKOCYTE ESTERASE UR QL STRIP: ABNORMAL
LYMPHOCYTES # BLD AUTO: 2.2 THOU/UL (ref 0.8–4.4)
LYMPHOCYTES NFR BLD AUTO: 23 % (ref 20–40)
MCH RBC QN AUTO: 29.1 PG (ref 27–34)
MCH RBC QN AUTO: 29.1 PG (ref 27–34)
MCHC RBC AUTO-ENTMCNC: 33 G/DL (ref 32–36)
MCHC RBC AUTO-ENTMCNC: 33 G/DL (ref 32–36)
MCV RBC AUTO: 88 FL (ref 80–100)
MCV RBC AUTO: 88 FL (ref 80–100)
MONOCYTES # BLD AUTO: 0.8 THOU/UL (ref 0–0.9)
MONOCYTES NFR BLD AUTO: 9 % (ref 2–10)
MUCOUS THREADS #/AREA URNS LPF: ABNORMAL LPF
NEUTROPHILS # BLD AUTO: 5.7 THOU/UL (ref 2–7.7)
NEUTROPHILS NFR BLD AUTO: 61 % (ref 50–70)
NITRATE UR QL: POSITIVE
PH UR STRIP: 5.5 [PH] (ref 4.5–8)
PLATELET # BLD AUTO: 527 THOU/UL (ref 140–440)
PLATELET # BLD AUTO: 527 THOU/UL (ref 140–440)
PMV BLD AUTO: 9.4 FL (ref 8.5–12.5)
POTASSIUM BLD-SCNC: 3.3 MMOL/L (ref 3.5–5)
POTASSIUM SERPL-SCNC: 3.3 MMOL/L (ref 3.5–5)
RBC # BLD AUTO: 5.43 MILL/UL (ref 3.8–5.4)
RBC # BLD AUTO: 5.43 MILL/UL (ref 3.8–5.4)
RBC #/AREA URNS AUTO: ABNORMAL HPF
SODIUM SERPL-SCNC: 147 MMOL/L (ref 136–145)
SODIUM SERPL-SCNC: 147 MMOL/L (ref 136–145)
SP GR UR STRIP: 1.02 (ref 1–1.03)
SQUAMOUS #/AREA URNS AUTO: ABNORMAL LPF
UROBILINOGEN UR STRIP-ACNC: ABNORMAL
WBC # BLD AUTO: 9.5 THOU/UL (ref 4–11)
WBC #/AREA URNS AUTO: ABNORMAL HPF
WBC: 9.5 THOU/UL (ref 4–11)

## 2019-05-03 ENCOUNTER — RECORDS - HEALTHEAST (OUTPATIENT)
Dept: LAB | Facility: CLINIC | Age: 84
End: 2019-05-03

## 2019-05-03 LAB — BACTERIA SPEC CULT: ABNORMAL

## 2019-05-06 LAB
ANION GAP SERPL CALCULATED.3IONS-SCNC: 9 MMOL/L (ref 5–18)
BUN SERPL-MCNC: 13 MG/DL (ref 8–28)
CALCIUM SERPL-MCNC: 9.2 MG/DL (ref 8.5–10.5)
CHLORIDE BLD-SCNC: 104 MMOL/L (ref 98–107)
CO2 SERPL-SCNC: 26 MMOL/L (ref 22–31)
CREAT SERPL-MCNC: 1.09 MG/DL (ref 0.6–1.1)
GFR SERPL CREATININE-BSD FRML MDRD: 48 ML/MIN/1.73M2
GLUCOSE BLD-MCNC: 58 MG/DL (ref 70–125)
POTASSIUM BLD-SCNC: 3.8 MMOL/L (ref 3.5–5)
SODIUM SERPL-SCNC: 139 MMOL/L (ref 136–145)

## 2019-05-07 ENCOUNTER — ASSISTED LIVING VISIT (OUTPATIENT)
Dept: GERIATRICS | Facility: CLINIC | Age: 84
End: 2019-05-07
Payer: MEDICARE

## 2019-05-07 VITALS
DIASTOLIC BLOOD PRESSURE: 56 MMHG | HEART RATE: 65 BPM | TEMPERATURE: 96.4 F | SYSTOLIC BLOOD PRESSURE: 135 MMHG | RESPIRATION RATE: 18 BRPM | BODY MASS INDEX: 20.28 KG/M2 | WEIGHT: 120 LBS

## 2019-05-07 DIAGNOSIS — N39.0 URINARY TRACT INFECTION WITHOUT HEMATURIA, SITE UNSPECIFIED: ICD-10-CM

## 2019-05-07 DIAGNOSIS — R13.10 DYSPHAGIA, UNSPECIFIED TYPE: ICD-10-CM

## 2019-05-07 DIAGNOSIS — J44.9 CHRONIC OBSTRUCTIVE PULMONARY DISEASE, UNSPECIFIED COPD TYPE (H): Primary | ICD-10-CM

## 2019-05-07 DIAGNOSIS — R63.4 WEIGHT LOSS: ICD-10-CM

## 2019-05-07 DIAGNOSIS — R00.1 BRADYCARDIA: ICD-10-CM

## 2019-05-07 RX ORDER — SULFAMETHOXAZOLE/TRIMETHOPRIM 800-160 MG
1 TABLET ORAL 2 TIMES DAILY
COMMUNITY
End: 2019-06-12

## 2019-05-07 NOTE — PROGRESS NOTES
[place] [facility]  [place] Medical Record Number:  [id number]  Place of Service where encounter took place:  [facility] [[id number]]  Chief Complaint   Patient presents with     RECHECK       HPI:    Zoraida Olivarez  is a 85 year old (4/11/1934), who is being seen today for an episodic care visit.  HPI information obtained from: {[facility]GS HPI:[id number]}. Today's concern is:  {FGS DX:857320}    Past Medical and Surgical History reviewed in Epic today.    MEDICATIONS:  Current Outpatient Medications   Medication Sig Dispense Refill     acetaminophen (TYLENOL) 325 MG tablet Take 650 mg by mouth daily as needed for mild pain       albuterol (PROAIR HFA/PROVENTIL HFA/VENTOLIN HFA) 108 (90 Base) MCG/ACT inhaler Inhale 2 puffs into the lungs every 4 hours as needed for shortness of breath / dyspnea or wheezing       ASPIRIN ADULT LOW STRENGTH 81 MG EC tablet TAKE 1 TABLET BY MOUTH ONCE DAILY 30 tablet 11     bacitracin 500 UNIT/GM OINT Apply topically daily       escitalopram (LEXAPRO) 10 MG tablet TAKE 1 TABLET BY MOUTH EVERY MORNING 30 tablet 11     estradiol (ESTRACE) 0.5 MG tablet TAKE ONE-HALF TABLET (0.25MG) BY MOUTH ONCE DAILY 30 tablet 11     fluticasone-salmeterol (ADVAIR) 250-50 MCG/DOSE inhaler INHALE 1 PUFF BY MOUTH TWICE DAILY 1 Inhaler 11     ipratropium - albuterol 0.5 mg/2.5 mg/3 mL (DUONEB) 0.5-2.5 (3) MG/3ML neb solution Take 1 vial by nebulization every 4 hours as needed (AND BID SCHEDULED)        levETIRAcetam (KEPPRA) 250 MG tablet TAKE 1 TABLET BY MOUTH ONCE DAILY 30 tablet 11     melatonin 3 MG tablet TAKE 1 TABLET BY MOUTH ONCE DAILY 30 tablet 11     Nutritional Supplements (ENSURE PLUS) LIQD Take by mouth 2 times daily       polyethylene glycol (MIRALAX/GLYCOLAX) packet Take 17 g by mouth daily       propranolol (INDERAL) 20 MG tablet TAKE 1 TABLET BY MOUTH TWICE DAILY 60 tablet 11     rosuvastatin (CRESTOR) 20 MG tablet TAKE 1 TABLET BY  MOUTH ONCE DAILY 30 tablet 11     Sennosides (EQ NATURAL VEGETABLE LAXATIVE PO) Take 8.6 mg by mouth daily as needed       SPIRIVA HANDIHALER 18 MCG inhaled capsule INHALE THE CONTENTS OF ONE CAPSULE ONCE DAILY USING HANDIHALER DEVICE 30 capsule 11     sulfamethoxazole-trimethoprim (BACTRIM DS/SEPTRA DS) 800-160 MG tablet Take 1 tablet by mouth 2 times daily       ***    REVIEW OF SYSTEMS:  {ROS FGS:651315}    Objective:  /56   Pulse 65   Temp 96.4  F (35.8  C)   Resp 18   Wt 54.4 kg (120 lb)   BMI 20.28 kg/m    Exam:  {Nursing home physical exam :178566}    Labs:   {fgslab:132256}    ASSESSMENT/PLAN:  {FGS DX:242786}    {fgsorders:645657}  ***    {FGS TIME SPENT:297847}  Electronically signed by:  Lisa Khan ***  {Providers Please double check the med list (in the plan section >> meds & orders tab) and Discontinue any of the meds flagged by the TC to be discontinued}

## 2019-05-07 NOTE — LETTER
5/7/2019        RE: Zoraida Olivarez  [facility]  [address] [facility] [address]        [place] [facility]  [place] Medical Record Number:  [id number]  Place of Service where encounter took place:  [facility] ([facility] [[id number]]  Chief Complaint   Patient presents with     RECHECK       HPI:    Zoraida Olivarez  is a 85 year old (4/11/1934), who is being seen today for an episodic care visit.  HPI information obtained from: {FGS HPI:[id number]}. Today's concern is:  {FGS DX:367661}      Past Medical and Surgical History reviewed in Epic today.    MEDICATIONS:  Current Outpatient Medications   Medication Sig Dispense Refill     acetaminophen (TYLENOL) 325 MG tablet Take 650 mg by mouth daily as needed for mild pain       albuterol (PROAIR HFA/PROVENTIL HFA/VENTOLIN HFA) 108 (90 Base) MCG/ACT inhaler Inhale 2 puffs into the lungs every 4 hours as needed for shortness of breath / dyspnea or wheezing       ASPIRIN ADULT LOW STRENGTH 81 MG EC tablet TAKE 1 TABLET BY MOUTH ONCE DAILY 30 tablet 11     bacitracin 500 UNIT/GM OINT Apply topically daily       escitalopram (LEXAPRO) 10 MG tablet TAKE 1 TABLET BY MOUTH EVERY MORNING 30 tablet 11     estradiol (ESTRACE) 0.5 MG tablet TAKE ONE-HALF TABLET (0.25MG) BY MOUTH ONCE DAILY 30 tablet 11     fluticasone-salmeterol (ADVAIR) 250-50 MCG/DOSE inhaler INHALE 1 PUFF BY MOUTH TWICE DAILY 1 Inhaler 11     ipratropium - albuterol 0.5 mg/2.5 mg/3 mL (DUONEB) 0.5-2.5 (3) MG/3ML neb solution Take 1 vial by nebulization every 4 hours as needed (AND BID SCHEDULED)        levETIRAcetam (KEPPRA) 250 MG tablet TAKE 1 TABLET BY MOUTH ONCE DAILY 30 tablet 11     melatonin 3 MG tablet TAKE 1 TABLET BY MOUTH ONCE DAILY 30 tablet 11     Nutritional Supplements (ENSURE PLUS) LIQD Take by mouth 2 times daily       polyethylene glycol (MIRALAX/GLYCOLAX) packet Take 17 g by mouth daily       propranolol (INDERAL)  "20 MG tablet TAKE 1 TABLET BY MOUTH TWICE DAILY 60 tablet 11     rosuvastatin (CRESTOR) 20 MG tablet TAKE 1 TABLET BY MOUTH ONCE DAILY 30 tablet 11     Sennosides (EQ NATURAL VEGETABLE LAXATIVE PO) Take 8.6 mg by mouth daily as needed       SPIRIVA HANDIHALER 18 MCG inhaled capsule INHALE THE CONTENTS OF ONE CAPSULE ONCE DAILY USING HANDIHALER DEVICE 30 capsule 11     sulfamethoxazole-trimethoprim (BACTRIM DS/SEPTRA DS) 800-160 MG tablet Take 1 tablet by mouth 2 times daily       ***    REVIEW OF SYSTEMS:  {ROS FGS:169864}    Objective:  /56   Pulse 65   Temp 96.4  F (35.8  C)   Resp 18   Wt 54.4 kg (120 lb)   BMI 20.28 kg/m     Exam:  {Nursing home physical exam :344630}    Labs:   {fgslab:942392}    ASSESSMENT/PLAN:  {FGS DX:943065}    {fgsorders:069630}  ***    {FGS TIME SPENT:259121}  Electronically signed by:  Lisa Khan ***  {Providers Please double check the med list (in the plan section >> meds & orders tab) and Discontinue any of the meds flagged by the TC to be discontinued}          [facility]  [place] Medical Record Number:  [id number]  Place of Service where encounter took place:  [facility] ([facility]) [[id number]]  Chief Complaint   Patient presents with     RECHECK       HPI:    Zoraida Olivarez  is a 85 year old (4/11/1934), who is being seen today for an episodic care visit.  HPI information obtained from: facility chart records, facility staff, patient report and [facility] chart review. Today's concern is:  1. Chronic obstructive pulmonary disease, unspecified COPD type (H)    2. Urinary tract infection without hematuria, site unspecified    3. Bradycardia    4. Weight loss    5. Dysphagia, unspecified type      Ms. Olivarez was visited this morning, prior to breakfast. She reports she slept well last evening. Nasal cannula is by her in the bed but not on, \"it doesn't fit right\". Denies feeling short of breath. She is " unsure if she has dizziness or if she has painful urination.     Staff reports that she has not been wearing the oxygen. Eating a fair amount, enjoys the magic cups.     VITALS TODAY:   130/72, HR 49, RR 16, 89-91% on room air.     Past Medical and Surgical History reviewed in Epic today.    MEDICATIONS:  Current Outpatient Medications   Medication Sig Dispense Refill     acetaminophen (TYLENOL) 325 MG tablet Take 650 mg by mouth daily as needed for mild pain       albuterol (PROAIR HFA/PROVENTIL HFA/VENTOLIN HFA) 108 (90 Base) MCG/ACT inhaler Inhale 2 puffs into the lungs every 4 hours as needed for shortness of breath / dyspnea or wheezing       ASPIRIN ADULT LOW STRENGTH 81 MG EC tablet TAKE 1 TABLET BY MOUTH ONCE DAILY 30 tablet 11     bacitracin 500 UNIT/GM OINT Apply topically daily       escitalopram (LEXAPRO) 10 MG tablet TAKE 1 TABLET BY MOUTH EVERY MORNING 30 tablet 11     estradiol (ESTRACE) 0.5 MG tablet TAKE ONE-HALF TABLET (0.25MG) BY MOUTH ONCE DAILY 30 tablet 11     fluticasone-salmeterol (ADVAIR) 250-50 MCG/DOSE inhaler INHALE 1 PUFF BY MOUTH TWICE DAILY 1 Inhaler 11     ipratropium - albuterol 0.5 mg/2.5 mg/3 mL (DUONEB) 0.5-2.5 (3) MG/3ML neb solution Take 1 vial by nebulization every 4 hours as needed (AND BID SCHEDULED)        levETIRAcetam (KEPPRA) 250 MG tablet TAKE 1 TABLET BY MOUTH ONCE DAILY 30 tablet 11     melatonin 3 MG tablet TAKE 1 TABLET BY MOUTH ONCE DAILY 30 tablet 11     Nutritional Supplements (ENSURE PLUS) LIQD Take by mouth 2 times daily       polyethylene glycol (MIRALAX/GLYCOLAX) packet Take 17 g by mouth daily       propranolol (INDERAL) 20 MG tablet TAKE 1 TABLET BY MOUTH TWICE DAILY 60 tablet 11     rosuvastatin (CRESTOR) 20 MG tablet TAKE 1 TABLET BY MOUTH ONCE DAILY 30 tablet 11     Sennosides (EQ NATURAL VEGETABLE LAXATIVE PO) Take 8.6 mg by mouth daily as needed       SPIRIVA HANDIHALER 18 MCG inhaled capsule INHALE THE CONTENTS OF ONE CAPSULE ONCE DAILY USING  HANDIHALER DEVICE 30 capsule 11     sulfamethoxazole-trimethoprim (BACTRIM DS/SEPTRA DS) 800-160 MG tablet Take 1 tablet by mouth 2 times daily         REVIEW OF SYSTEMS:  Limited secondary to cognitive impairment but today pt reports no complaints of pain    Objective:  /56   Pulse 65   Temp 96.4  F (35.8  C)   Resp 18   Wt 54.4 kg (120 lb)   BMI 20.28 kg/m     Exam:  GENERAL APPEARANCE:  Alert, pleasant, cooperative  EYES: no discharge or mattering on lids or lashes noted  ENT:  dry mucous membranes, hearing acuity intact  RESP: no respiratory distress, Lung sounds clear, diminished in bases, patient is on room air  CV:  rate and rhythm regular, no murmur. Edema none in bilateral lower extremities. ABDOMEN: normal bowel sounds, soft, nontender.  M/S:   Gait and station: ambulates independently and hand held , no tenderness or swelling of the joints; able to move all extremities   SKIN:  warm and dry.   NEURO: no facial asymmetry, no speech deficits and able to follow directions, moves all extremities symmetrically  PSYCH:  insight, judgement, and memory impaired affect and mood normal    Labs:   CBC RESULTS:   Recent Labs   Lab Test 05/01/19   WBC 9.5   RBC 5.43*   HGB 15.8*   HCT 47.9*   MCV 88   MCH 29.1   MCHC 33.0   RDW 13.8   *       Last Basic Metabolic Panel:  Recent Labs   Lab Test 05/01/19   *   POTASSIUM 3.3*   CHLORIDE 109*   DIANE 9.6   CO2 27   BUN 23   CR 0.85   GLC 92     ASSESSMENT/PLAN:  (J44.9) Chronic obstructive pulmonary disease, unspecified COPD type (H)  (primary encounter diagnosis)  Comment: no wheezing on exam. Now has oxygen but declines use.   Plan:   --Duonebs BID  --staff to encourage oxygen 2 liters per nasal cannula continuously     (N39.0) Urinary tract infection without hematuria, site unspecified  Comment: was on nitrofurantoin but given continued lethargy last week, UA obtained an UTI still present. abx changed to bactrim and she has perked up more.   Plan:  continue with bactrim DS 1 tablet BID--last dose 5/10.    (R00.1) Bradycardia  Comment: hx of atrial fibrillation. HR 49 today.   Plan: hold propranolol this morning and continue with 20 mg BID if HR >60.     (R63.4) Weight loss  (R13.10) Dysphagia, unspecified type  Comment: 15 pound weight loss since AL admission 135 lb ->120 lbs which is likely due to dysphagia and decreased oral intake at mealtime secondary to cognitive impairment.   Plan:  --daughter to schedule video swallow  --staff to encourage and assist at mealtimes.   --magic cup BID  --monitor weights. May need to consider mirtazapine for appetite stimulation in the future.     Electronically signed by:  CYNDI Miramontes CNP               Sincerely,        CYNDI Miramontes CNP

## 2019-05-07 NOTE — LETTER
5/7/2019        RE: Zoraida Olivarez  [facility]  [address] [facility] [address]        [place] [facility]  [place] Medical Record Number:  [id number]  Place of Service where encounter took place:  [facility] ([facility] [[id number]]  Chief Complaint   Patient presents with     RECHECK       HPI:    Zoraida Olivarez  is a 85 year old (4/11/1934), who is being seen today for an episodic care visit.  HPI information obtained from: {FGS HPI:[id number]}. Today's concern is:  {FGS DX:928625}      Past Medical and Surgical History reviewed in Epic today.    MEDICATIONS:  Current Outpatient Medications   Medication Sig Dispense Refill     acetaminophen (TYLENOL) 325 MG tablet Take 650 mg by mouth daily as needed for mild pain       albuterol (PROAIR HFA/PROVENTIL HFA/VENTOLIN HFA) 108 (90 Base) MCG/ACT inhaler Inhale 2 puffs into the lungs every 4 hours as needed for shortness of breath / dyspnea or wheezing       ASPIRIN ADULT LOW STRENGTH 81 MG EC tablet TAKE 1 TABLET BY MOUTH ONCE DAILY 30 tablet 11     bacitracin 500 UNIT/GM OINT Apply topically daily       escitalopram (LEXAPRO) 10 MG tablet TAKE 1 TABLET BY MOUTH EVERY MORNING 30 tablet 11     estradiol (ESTRACE) 0.5 MG tablet TAKE ONE-HALF TABLET (0.25MG) BY MOUTH ONCE DAILY 30 tablet 11     fluticasone-salmeterol (ADVAIR) 250-50 MCG/DOSE inhaler INHALE 1 PUFF BY MOUTH TWICE DAILY 1 Inhaler 11     ipratropium - albuterol 0.5 mg/2.5 mg/3 mL (DUONEB) 0.5-2.5 (3) MG/3ML neb solution Take 1 vial by nebulization every 4 hours as needed (AND BID SCHEDULED)        levETIRAcetam (KEPPRA) 250 MG tablet TAKE 1 TABLET BY MOUTH ONCE DAILY 30 tablet 11     melatonin 3 MG tablet TAKE 1 TABLET BY MOUTH ONCE DAILY 30 tablet 11     Nutritional Supplements (ENSURE PLUS) LIQD Take by mouth 2 times daily       polyethylene glycol (MIRALAX/GLYCOLAX) packet Take 17 g by mouth daily       propranolol (INDERAL)  "20 MG tablet TAKE 1 TABLET BY MOUTH TWICE DAILY 60 tablet 11     rosuvastatin (CRESTOR) 20 MG tablet TAKE 1 TABLET BY MOUTH ONCE DAILY 30 tablet 11     Sennosides (EQ NATURAL VEGETABLE LAXATIVE PO) Take 8.6 mg by mouth daily as needed       SPIRIVA HANDIHALER 18 MCG inhaled capsule INHALE THE CONTENTS OF ONE CAPSULE ONCE DAILY USING HANDIHALER DEVICE 30 capsule 11     sulfamethoxazole-trimethoprim (BACTRIM DS/SEPTRA DS) 800-160 MG tablet Take 1 tablet by mouth 2 times daily       ***    REVIEW OF SYSTEMS:  {ROS FGS:054855}    Objective:  /56   Pulse 65   Temp 96.4  F (35.8  C)   Resp 18   Wt 54.4 kg (120 lb)   BMI 20.28 kg/m     Exam:  {Nursing home physical exam :654725}    Labs:   {fgslab:138589}    ASSESSMENT/PLAN:  {FGS DX:914799}    {fgsorders:476161}  ***    {FGS TIME SPENT:931039}  Electronically signed by:  Lisa Khan ***  {Providers Please double check the med list (in the plan section >> meds & orders tab) and Discontinue any of the meds flagged by the TC to be discontinued}          [facility]  [place] Medical Record Number:  [id number]  Place of Service where encounter took place:  [facility] ([facility]) [[id number]]  Chief Complaint   Patient presents with     RECHECK       HPI:    Zoraida Olivarez  is a 85 year old (4/11/1934), who is being seen today for an episodic care visit.  HPI information obtained from: facility chart records, facility staff, patient report and [facility] chart review. Today's concern is:  1. Chronic obstructive pulmonary disease, unspecified COPD type (H)    2. Urinary tract infection without hematuria, site unspecified    3. Bradycardia    4. Weight loss    5. Dysphagia, unspecified type      Ms. Olivarez was visited this morning, prior to breakfast. She reports she slept well last evening. Nasal cannula is by her in the bed but not on, \"it doesn't fit right\". Denies feeling short of breath. She is " unsure if she has dizziness or if she has painful urination.     Staff reports that she has not been wearing the oxygen. Eating a fair amount, enjoys the magic cups.     VITALS TODAY:   130/72, HR 49, RR 16, 89-91% on room air.     Past Medical and Surgical History reviewed in Epic today.    MEDICATIONS:  Current Outpatient Medications   Medication Sig Dispense Refill     acetaminophen (TYLENOL) 325 MG tablet Take 650 mg by mouth daily as needed for mild pain       albuterol (PROAIR HFA/PROVENTIL HFA/VENTOLIN HFA) 108 (90 Base) MCG/ACT inhaler Inhale 2 puffs into the lungs every 4 hours as needed for shortness of breath / dyspnea or wheezing       ASPIRIN ADULT LOW STRENGTH 81 MG EC tablet TAKE 1 TABLET BY MOUTH ONCE DAILY 30 tablet 11     bacitracin 500 UNIT/GM OINT Apply topically daily       escitalopram (LEXAPRO) 10 MG tablet TAKE 1 TABLET BY MOUTH EVERY MORNING 30 tablet 11     estradiol (ESTRACE) 0.5 MG tablet TAKE ONE-HALF TABLET (0.25MG) BY MOUTH ONCE DAILY 30 tablet 11     fluticasone-salmeterol (ADVAIR) 250-50 MCG/DOSE inhaler INHALE 1 PUFF BY MOUTH TWICE DAILY 1 Inhaler 11     ipratropium - albuterol 0.5 mg/2.5 mg/3 mL (DUONEB) 0.5-2.5 (3) MG/3ML neb solution Take 1 vial by nebulization every 4 hours as needed (AND BID SCHEDULED)        levETIRAcetam (KEPPRA) 250 MG tablet TAKE 1 TABLET BY MOUTH ONCE DAILY 30 tablet 11     melatonin 3 MG tablet TAKE 1 TABLET BY MOUTH ONCE DAILY 30 tablet 11     Nutritional Supplements (ENSURE PLUS) LIQD Take by mouth 2 times daily       polyethylene glycol (MIRALAX/GLYCOLAX) packet Take 17 g by mouth daily       propranolol (INDERAL) 20 MG tablet TAKE 1 TABLET BY MOUTH TWICE DAILY 60 tablet 11     rosuvastatin (CRESTOR) 20 MG tablet TAKE 1 TABLET BY MOUTH ONCE DAILY 30 tablet 11     Sennosides (EQ NATURAL VEGETABLE LAXATIVE PO) Take 8.6 mg by mouth daily as needed       SPIRIVA HANDIHALER 18 MCG inhaled capsule INHALE THE CONTENTS OF ONE CAPSULE ONCE DAILY USING  HANDIHALER DEVICE 30 capsule 11     sulfamethoxazole-trimethoprim (BACTRIM DS/SEPTRA DS) 800-160 MG tablet Take 1 tablet by mouth 2 times daily         REVIEW OF SYSTEMS:  Limited secondary to cognitive impairment but today pt reports no complaints of pain    Objective:  /56   Pulse 65   Temp 96.4  F (35.8  C)   Resp 18   Wt 54.4 kg (120 lb)   BMI 20.28 kg/m     Exam:  GENERAL APPEARANCE:  Alert, pleasant, cooperative  EYES: no discharge or mattering on lids or lashes noted  ENT:  dry mucous membranes, hearing acuity intact  RESP: no respiratory distress, Lung sounds clear, diminished in bases, patient is on room air  CV:  rate and rhythm regular, no murmur. Edema none in bilateral lower extremities. ABDOMEN: normal bowel sounds, soft, nontender.  M/S:   Gait and station: ambulates independently and hand held , no tenderness or swelling of the joints; able to move all extremities   SKIN:  warm and dry.   NEURO: no facial asymmetry, no speech deficits and able to follow directions, moves all extremities symmetrically  PSYCH:  insight, judgement, and memory impaired affect and mood normal    Labs:   CBC RESULTS:   Recent Labs   Lab Test 05/01/19   WBC 9.5   RBC 5.43*   HGB 15.8*   HCT 47.9*   MCV 88   MCH 29.1   MCHC 33.0   RDW 13.8   *       Last Basic Metabolic Panel:  Recent Labs   Lab Test 05/01/19   *   POTASSIUM 3.3*   CHLORIDE 109*   DIANE 9.6   CO2 27   BUN 23   CR 0.85   GLC 92     ASSESSMENT/PLAN:  (J44.9) Chronic obstructive pulmonary disease, unspecified COPD type (H)  (primary encounter diagnosis)  Comment: no wheezing on exam. Now has oxygen but declines use.   Plan:   --Duonebs BID  --staff to encourage oxygen 2 liters per nasal cannula continuously     (N39.0) Urinary tract infection without hematuria, site unspecified  Comment: was on nitrofurantoin but given continued lethargy last week, UA obtained an UTI still present. abx changed to bactrim and she has perked up more.   Plan:  continue with bactrim DS 1 tablet BID--last dose 5/10.    (R00.1) Bradycardia  Comment: hx of atrial fibrillation. HR 49 today.   Plan: hold propranolol this morning and continue with 20 mg BID if HR >60.     (R63.4) Weight loss  (R13.10) Dysphagia, unspecified type  Comment: 15 pound weight loss since AL admission 135 lb ->120 lbs which is likely due to dysphagia and decreased oral intake at mealtime secondary to cognitive impairment.   Plan:  --daughter to schedule video swallow  --staff to encourage and assist at mealtimes.   --magic cup BID  --monitor weights. May need to consider mirtazapine for appetite stimulation in the future.     Electronically signed by:  CYNDI Miramontes CNP               Sincerely,        CYNDI Miramontes CNP

## 2019-05-08 NOTE — PROGRESS NOTES
[place] [facility]  [place] Medical Record Number:  [id number]  Place of Service where encounter took place:  [facility] ([facility] [[id number]]  Chief Complaint   Patient presents with     RECHECK       HPI:    Zoraida Olivarez  is a 85 year old (4/11/1934), who is being seen today for an episodic care visit.  HPI information obtained from: {FGS HPI:[id number]}. Today's concern is:  {FGS DX:801378}      Past Medical and Surgical History reviewed in Epic today.    MEDICATIONS:  Current Outpatient Medications   Medication Sig Dispense Refill     acetaminophen (TYLENOL) 325 MG tablet Take 650 mg by mouth daily as needed for mild pain       albuterol (PROAIR HFA/PROVENTIL HFA/VENTOLIN HFA) 108 (90 Base) MCG/ACT inhaler Inhale 2 puffs into the lungs every 4 hours as needed for shortness of breath / dyspnea or wheezing       ASPIRIN ADULT LOW STRENGTH 81 MG EC tablet TAKE 1 TABLET BY MOUTH ONCE DAILY 30 tablet 11     bacitracin 500 UNIT/GM OINT Apply topically daily       escitalopram (LEXAPRO) 10 MG tablet TAKE 1 TABLET BY MOUTH EVERY MORNING 30 tablet 11     estradiol (ESTRACE) 0.5 MG tablet TAKE ONE-HALF TABLET (0.25MG) BY MOUTH ONCE DAILY 30 tablet 11     fluticasone-salmeterol (ADVAIR) 250-50 MCG/DOSE inhaler INHALE 1 PUFF BY MOUTH TWICE DAILY 1 Inhaler 11     ipratropium - albuterol 0.5 mg/2.5 mg/3 mL (DUONEB) 0.5-2.5 (3) MG/3ML neb solution Take 1 vial by nebulization every 4 hours as needed (AND BID SCHEDULED)        levETIRAcetam (KEPPRA) 250 MG tablet TAKE 1 TABLET BY MOUTH ONCE DAILY 30 tablet 11     melatonin 3 MG tablet TAKE 1 TABLET BY MOUTH ONCE DAILY 30 tablet 11     Nutritional Supplements (ENSURE PLUS) LIQD Take by mouth 2 times daily       polyethylene glycol (MIRALAX/GLYCOLAX) packet Take 17 g by mouth daily       propranolol (INDERAL) 20 MG tablet TAKE 1 TABLET BY MOUTH TWICE DAILY 60 tablet 11     rosuvastatin (CRESTOR) 20 MG tablet TAKE 1 TABLET BY  MOUTH ONCE DAILY 30 tablet 11     Sennosides (EQ NATURAL VEGETABLE LAXATIVE PO) Take 8.6 mg by mouth daily as needed       SPIRIVA HANDIHALER 18 MCG inhaled capsule INHALE THE CONTENTS OF ONE CAPSULE ONCE DAILY USING HANDIHALER DEVICE 30 capsule 11     sulfamethoxazole-trimethoprim (BACTRIM DS/SEPTRA DS) 800-160 MG tablet Take 1 tablet by mouth 2 times daily       ***    REVIEW OF SYSTEMS:  {ROS FGS:629451}    Objective:  /56   Pulse 65   Temp 96.4  F (35.8  C)   Resp 18   Wt 54.4 kg (120 lb)   BMI 20.28 kg/m    Exam:  {Nursing home physical exam :690913}    Labs:   {fgslab:555962}    ASSESSMENT/PLAN:  {FGS DX:942156}    {fgsorders:284145}  ***    {FGS TIME SPENT:148358}  Electronically signed by:  Lisa Khan ***  {Providers Please double check the med list (in the plan section >> meds & orders tab) and Discontinue any of the meds flagged by the TC to be discontinued}

## 2019-05-08 NOTE — PROGRESS NOTES
"[facility]  [facility] Record Number:  [id number]  Place of Service where encounter took place:  [facility] [[id number]]  Chief Complaint   Patient presents with     RECHECK       HPI:    Zoraida Olivarez  is a 85 year old (4/11/1934), who is being seen today for an episodic care visit.  HPI information obtained from: facility chart records, facility staff, patient report and [facility] chart review. Today's concern is:  1. Chronic obstructive pulmonary disease, unspecified COPD type (H)    2. Urinary tract infection without hematuria, site unspecified    3. Bradycardia    4. Weight loss    5. Dysphagia, unspecified type      Ms. Olivarez was visited this morning, prior to breakfast. She reports she slept well last evening. Nasal cannula is by her in the bed but not on, \"it doesn't fit right\". Denies feeling short of breath. She is unsure if she has dizziness or if she has painful urination.     Staff reports that she has not been wearing the oxygen. Eating a fair amount, enjoys the magic cups.     VITALS TODAY:   130/72, HR 49, RR 16, 89-91% on room air.     Past Medical and Surgical History reviewed in Epic today.    MEDICATIONS:  Current Outpatient Medications   Medication Sig Dispense Refill     acetaminophen (TYLENOL) 325 MG tablet Take 650 mg by mouth daily as needed for mild pain       albuterol (PROAIR HFA/PROVENTIL HFA/VENTOLIN HFA) 108 (90 Base) MCG/ACT inhaler Inhale 2 puffs into the lungs every 4 hours as needed for shortness of breath / dyspnea or wheezing       ASPIRIN ADULT LOW STRENGTH 81 MG EC tablet TAKE 1 TABLET BY MOUTH ONCE DAILY 30 tablet 11     bacitracin 500 UNIT/GM OINT Apply topically daily       escitalopram (LEXAPRO) 10 MG tablet TAKE 1 TABLET BY MOUTH EVERY MORNING 30 tablet 11     estradiol (ESTRACE) 0.5 MG tablet TAKE ONE-HALF TABLET (0.25MG) BY MOUTH ONCE DAILY 30 tablet 11     fluticasone-salmeterol " (ADVAIR) 250-50 MCG/DOSE inhaler INHALE 1 PUFF BY MOUTH TWICE DAILY 1 Inhaler 11     ipratropium - albuterol 0.5 mg/2.5 mg/3 mL (DUONEB) 0.5-2.5 (3) MG/3ML neb solution Take 1 vial by nebulization every 4 hours as needed (AND BID SCHEDULED)        levETIRAcetam (KEPPRA) 250 MG tablet TAKE 1 TABLET BY MOUTH ONCE DAILY 30 tablet 11     melatonin 3 MG tablet TAKE 1 TABLET BY MOUTH ONCE DAILY 30 tablet 11     Nutritional Supplements (ENSURE PLUS) LIQD Take by mouth 2 times daily       polyethylene glycol (MIRALAX/GLYCOLAX) packet Take 17 g by mouth daily       propranolol (INDERAL) 20 MG tablet TAKE 1 TABLET BY MOUTH TWICE DAILY 60 tablet 11     rosuvastatin (CRESTOR) 20 MG tablet TAKE 1 TABLET BY MOUTH ONCE DAILY 30 tablet 11     Sennosides (EQ NATURAL VEGETABLE LAXATIVE PO) Take 8.6 mg by mouth daily as needed       SPIRIVA HANDIHALER 18 MCG inhaled capsule INHALE THE CONTENTS OF ONE CAPSULE ONCE DAILY USING HANDIHALER DEVICE 30 capsule 11     sulfamethoxazole-trimethoprim (BACTRIM DS/SEPTRA DS) 800-160 MG tablet Take 1 tablet by mouth 2 times daily         REVIEW OF SYSTEMS:  Limited secondary to cognitive impairment but today pt reports no complaints of pain    Objective:  /56   Pulse 65   Temp 96.4  F (35.8  C)   Resp 18   Wt 54.4 kg (120 lb)   BMI 20.28 kg/m    Exam:  GENERAL APPEARANCE:  Alert, pleasant, cooperative  EYES: no discharge or mattering on lids or lashes noted  ENT:  dry mucous membranes, hearing acuity intact  RESP: no respiratory distress, Lung sounds clear, diminished in bases, patient is on room air  CV:  rate and rhythm regular, no murmur. Edema none in bilateral lower extremities. ABDOMEN: normal bowel sounds, soft, nontender.  M/S:   Gait and station: ambulates independently and hand held , no tenderness or swelling of the joints; able to move all extremities   SKIN:  warm and dry.   NEURO: no facial asymmetry, no speech deficits and able to follow directions, moves all extremities  symmetrically  PSYCH:  insight, judgement, and memory impaired affect and mood normal    Labs:   CBC RESULTS:   Recent Labs   Lab Test 05/01/19   WBC 9.5   RBC 5.43*   HGB 15.8*   HCT 47.9*   MCV 88   MCH 29.1   MCHC 33.0   RDW 13.8   *       Last Basic Metabolic Panel:  Recent Labs   Lab Test 05/01/19   *   POTASSIUM 3.3*   CHLORIDE 109*   DIANE 9.6   CO2 27   BUN 23   CR 0.85   GLC 92     ASSESSMENT/PLAN:  (J44.9) Chronic obstructive pulmonary disease, unspecified COPD type (H)  (primary encounter diagnosis)  Comment: no wheezing on exam. Now has oxygen but declines use.   Plan:   --Duonebs BID  --staff to encourage oxygen 2 liters per nasal cannula continuously     (N39.0) Urinary tract infection without hematuria, site unspecified  Comment: was on nitrofurantoin but given continued lethargy last week, UA obtained an UTI still present. abx changed to bactrim and she has perked up more.   Plan: continue with bactrim DS 1 tablet BID--last dose 5/10.    (R00.1) Bradycardia  Comment: hx of atrial fibrillation. HR 49 today.   Plan: hold propranolol this morning and continue with 20 mg BID if HR >60.     (R63.4) Weight loss  (R13.10) Dysphagia, unspecified type  Comment: 15 pound weight loss since AL admission 135 lb ->120 lbs which is likely due to dysphagia and decreased oral intake at mealtime secondary to cognitive impairment.   Plan:  --daughter to schedule video swallow  --staff to encourage and assist at mealtimes.   --magic cup BID  --monitor weights. May need to consider mirtazapine for appetite stimulation in the future.     Electronically signed by:  CYNDI Miramontes CNP

## 2019-05-28 VITALS
DIASTOLIC BLOOD PRESSURE: 72 MMHG | HEART RATE: 76 BPM | SYSTOLIC BLOOD PRESSURE: 147 MMHG | BODY MASS INDEX: 22.31 KG/M2 | WEIGHT: 132 LBS | RESPIRATION RATE: 18 BRPM | TEMPERATURE: 96.4 F

## 2019-05-28 RX ORDER — ZINC OXIDE 216 MG/ML
LOTION TOPICAL 3 TIMES DAILY
COMMUNITY
End: 2020-09-10

## 2019-05-28 RX ORDER — AMMONIUM LACTATE 12 G/100G
CREAM TOPICAL DAILY PRN
COMMUNITY

## 2019-05-29 ENCOUNTER — ASSISTED LIVING VISIT (OUTPATIENT)
Dept: GERIATRICS | Facility: CLINIC | Age: 84
End: 2019-05-29
Payer: MEDICARE

## 2019-05-29 DIAGNOSIS — I73.9 PVD (PERIPHERAL VASCULAR DISEASE) (H): ICD-10-CM

## 2019-05-29 DIAGNOSIS — G30.1 LATE ONSET ALZHEIMER'S DISEASE WITHOUT BEHAVIORAL DISTURBANCE (H): ICD-10-CM

## 2019-05-29 DIAGNOSIS — J44.9 CHRONIC OBSTRUCTIVE PULMONARY DISEASE, UNSPECIFIED COPD TYPE (H): Primary | ICD-10-CM

## 2019-05-29 DIAGNOSIS — R13.10 DYSPHAGIA, UNSPECIFIED TYPE: ICD-10-CM

## 2019-05-29 DIAGNOSIS — F02.80 LATE ONSET ALZHEIMER'S DISEASE WITHOUT BEHAVIORAL DISTURBANCE (H): ICD-10-CM

## 2019-05-29 DIAGNOSIS — F43.21 ADJUSTMENT DISORDER WITH DEPRESSED MOOD: ICD-10-CM

## 2019-05-29 DIAGNOSIS — Z86.79 HX OF ANEURYSM: ICD-10-CM

## 2019-05-29 DIAGNOSIS — I48.0 PAF (PAROXYSMAL ATRIAL FIBRILLATION) (H): ICD-10-CM

## 2019-05-29 DIAGNOSIS — G25.0 ESSENTIAL TREMOR: ICD-10-CM

## 2019-05-29 NOTE — LETTER
"    5/29/2019        RE: Zoraida Olivarez  [facility]  [address] [facility] [address]        Zoraida Olivarez is a 85 year old female seen May 29, 2019 at [facility] unit where she has resided for 3 months (admit 2/2019) seen to follow up COPD with hypoxia and dementia.     Patient is seen in her room, ambulates there with 4WW and O2 by NC.   States \"Basically I feel fine\"     Thinks her breathing is okay, not sure she needs the oxygen supplementation but agreeing to wear it now.   Had been taking it off earlier.      By chart review, patient was found down at home in February and hospitalized at [facility] for E coli UTI with sepsis, atrial fib with RVR and COPD exacerbation        She was treated with IVF and abx; VR control with metoprolol, but not anticoagulated given h/o brain aneurysm, falls and dementia.  She went to [facility], but did not improve enough to return to independent living, transferred to [facility] for permanent placement.      Patient has followed with Dr Hightower, Neurology for decades after being diagnosed with a cerebral aneurysm in 1980.  She had a surgical repair and is on Keppra for sz prophylaxis.    No current headaches of neurologic changes.       Past Medical History:   Diagnosis Date     Cerebral aneurysm, 1980      Chronic obstructive lung disease (H), 2003      Hearing loss, sensorineural, combined types, 2009      History of shingles      Hyperlipidemia      Ileitis      Incomplete RBBB      Major depression      Memory loss      PAD (peripheral artery disease) (H), s/p bilateral iliac stents 2007      Polyp of colon, adenomatous, 2008      Skin cancer of arm      Tobacco abuse      Tremor, essential, 2004        Past Surgical History:   Procedure Laterality Date     APPENDECTOMY       CHOLECYSTECTOMY       HYSTERECTOMY       SH:  Lives in [facility].   Previously lived alone in her condo.      She has " a daughter Nava and a son Amos.   Pt reports she grew up in [place].  Worked as a  at one point.      +smoker until admission; smoked for >60 years    ROS:  SLUMS 1/30   ACL 3.8 in TCU  Able to stand independently and ambulate with 4WW  Has dysphagia, on pureed diet  PVD had LE edema and venous stasis ulcers, now healed.       EXAM:  Pleasant, NAD  /72   Pulse 76   Temp 96.4  F (35.8  C)   Resp 18   Wt 59.9 kg (132 lb)   BMI 22.31 kg/m      Today /68   HR 66   O2sat 94% on 2 L/min O2  Neck supple without adenopathy  Lungs with decreased BS, no rales or wheeze  Heart RRR s1s2 @66   Abd soft, NT, no distention, +BS  Ext without edema, wearing compression stockings  Neuro: limited history, bilateral hand intention tremor     Psych: a little guarded.      Last Comprehensive Metabolic Panel:  Sodium   Date Value Ref Range Status   05/01/2019 147 (A) 136 - 145 mmol/L Final     Potassium   Date Value Ref Range Status   05/01/2019 3.3 (A) 3.5 - 5.0 mmol/L Final     Chloride   Date Value Ref Range Status   05/01/2019 109 (A) 98 - 107 mmol/L Final     Carbon Dioxide   Date Value Ref Range Status   05/01/2019 27 22 - 31 mmol/L Final     Anion Gap   Date Value Ref Range Status   05/01/2019 11 5 - 18 mmol/L Final     Glucose   Date Value Ref Range Status   05/01/2019 92 70 - 125 mg/dL Final     Urea Nitrogen   Date Value Ref Range Status   05/01/2019 23 8 - 26 mg/dL Final     Creatinine   Date Value Ref Range Status   05/01/2019 0.85 0.60 - 1.10 mg/dL Final     GFR Estimate   Date Value Ref Range Status   05/01/2019 >60 >60 ml/min/1.73m2 Final     Calcium   Date Value Ref Range Status   05/01/2019 9.6 8.5 - 10.5 mg/dL Final     Lab Results   Component Value Date    WBC 9.5 05/01/2019      HGB 15.8 05/01/2019      MCV 88 05/01/2019       05/01/2019       IMP/PLAN:   (J44.9) Chronic obstructive pulmonary disease, unspecified COPD type (H)   Comment: past smoker     Plan: O2 per NC for hypoxia      Continue Spiriva, Duonebs bid and prn, ventolin MDI prn       (I48.0) PAF (paroxysmal atrial fibrillation) (H)  Comment: regular today     Plan: propanolol for VR control and tremor; on ASA only as above       (I73.9) PVD (peripheral vascular disease) (H)  Comment: recent h/o LE ulcers    Plan: continue daily ASA, statin     (G25.0) Essential tremor  Comment: bilateral hands   Plan: propanolol bid     (Z86.79) Hx of aneurysm  Comment: s/p repair   Plan: continue Keppra for stroke prophylaxis  Follow up with Dr Hightower      (R13.10) Dysphagia, unspecified type  Comment: weight loss has stabilized   Plan: pureed diet, Magic cup supplements       (G30.1,  F02.80) Late onset Alzheimer's disease without behavioral disturbance  Comment: low functional status    Plan: [facility] unit for assist with meds, meals, activity       (F43.21) Adjustment disorder with depressed mood  Comment: loss of autonomy and coping skills   Plan: continue escitalopram  Melatonin for sleep        Meghan Robles MD       Sincerely,        Meghan Robles MD

## 2019-06-01 NOTE — PROGRESS NOTES
"Zoraida Olivarez is a 85 year old female seen May 29, 2019 at [facility] unit where she has resided for 3 months (admit 2/2019) seen to follow up COPD with hypoxia and dementia.     Patient is seen in her room, ambulates there with 4WW and O2 by NC.   States \"Basically I feel fine\"     Thinks her breathing is okay, not sure she needs the oxygen supplementation but agreeing to wear it now.   Had been taking it off earlier.      By chart review, patient was found down at home in February and hospitalized at [facility] for E coli UTI with sepsis, atrial fib with RVR and COPD exacerbation        She was treated with IVF and abx; VR control with metoprolol, but not anticoagulated given h/o brain aneurysm, falls and dementia.  She went to [facility], but did not improve enough to return to independent living, transferred to [facility] for permanent placement.      Patient has followed with Dr Hightower, Neurology for decades after being diagnosed with a cerebral aneurysm in 1980.  She had a surgical repair and is on Keppra for sz prophylaxis.    No current headaches of neurologic changes.       Past Medical History:   Diagnosis Date     Cerebral aneurysm, 1980      Chronic obstructive lung disease (H), 2003      Hearing loss, sensorineural, combined types, 2009      History of shingles      Hyperlipidemia      Ileitis      Incomplete RBBB      Major depression      Memory loss      PAD (peripheral artery disease) (H), s/p bilateral iliac stents 2007      Polyp of colon, adenomatous, 2008      Skin cancer of arm      Tobacco abuse      Tremor, essential, 2004        Past Surgical History:   Procedure Laterality Date     APPENDECTOMY       CHOLECYSTECTOMY       HYSTERECTOMY       SH:  Lives in [facility] unit.   Previously lived alone in her condo.      She has a daughter Nava and a son Amos.   Pt reports she grew up in [place].  Worked as a  at one point.      +smoker " until admission; smoked for >60 years    ROS:  SLUMS 1/30   ACL 3.8 in TCU  Able to stand independently and ambulate with 4WW  Has dysphagia, on pureed diet  PVD had LE edema and venous stasis ulcers, now healed.       EXAM:  Pleasant, NAD  /72   Pulse 76   Temp 96.4  F (35.8  C)   Resp 18   Wt 59.9 kg (132 lb)   BMI 22.31 kg/m     Today /68   HR 66   O2sat 94% on 2 L/min O2  Neck supple without adenopathy  Lungs with decreased BS, no rales or wheeze  Heart RRR s1s2 @66   Abd soft, NT, no distention, +BS  Ext without edema, wearing compression stockings  Neuro: limited history, bilateral hand intention tremor     Psych: a little guarded.      Last Comprehensive Metabolic Panel:  Sodium   Date Value Ref Range Status   05/01/2019 147 (A) 136 - 145 mmol/L Final     Potassium   Date Value Ref Range Status   05/01/2019 3.3 (A) 3.5 - 5.0 mmol/L Final     Chloride   Date Value Ref Range Status   05/01/2019 109 (A) 98 - 107 mmol/L Final     Carbon Dioxide   Date Value Ref Range Status   05/01/2019 27 22 - 31 mmol/L Final     Anion Gap   Date Value Ref Range Status   05/01/2019 11 5 - 18 mmol/L Final     Glucose   Date Value Ref Range Status   05/01/2019 92 70 - 125 mg/dL Final     Urea Nitrogen   Date Value Ref Range Status   05/01/2019 23 8 - 26 mg/dL Final     Creatinine   Date Value Ref Range Status   05/01/2019 0.85 0.60 - 1.10 mg/dL Final     GFR Estimate   Date Value Ref Range Status   05/01/2019 >60 >60 ml/min/1.73m2 Final     Calcium   Date Value Ref Range Status   05/01/2019 9.6 8.5 - 10.5 mg/dL Final     Lab Results   Component Value Date    WBC 9.5 05/01/2019      HGB 15.8 05/01/2019      MCV 88 05/01/2019       05/01/2019       IMP/PLAN:   (J44.9) Chronic obstructive pulmonary disease, unspecified COPD type (H)   Comment: past smoker     Plan: O2 per NC for hypoxia     Continue Spiriva, Duonebs bid and prn, ventolin MDI prn       (I48.0) PAF (paroxysmal atrial fibrillation) (H)  Comment:  regular today     Plan: propanolol for VR control and tremor; on ASA only as above       (I73.9) PVD (peripheral vascular disease) (H)  Comment: recent h/o LE ulcers    Plan: continue daily ASA, statin     (G25.0) Essential tremor  Comment: bilateral hands   Plan: propanolol bid     (Z86.79) Hx of aneurysm  Comment: s/p repair   Plan: continue Keppra for stroke prophylaxis  Follow up with Dr Hightower      (R13.10) Dysphagia, unspecified type  Comment: weight loss has stabilized   Plan: pureed diet, Magic cup supplements       (G30.1,  F02.80) Late onset Alzheimer's disease without behavioral disturbance  Comment: low functional status    Plan: [facility] unit for assist with meds, meals, activity       (F43.21) Adjustment disorder with depressed mood  Comment: loss of autonomy and coping skills   Plan: continue escitalopram  Melatonin for sleep        Meghan Robles MD

## 2019-06-10 VITALS
WEIGHT: 132 LBS | DIASTOLIC BLOOD PRESSURE: 69 MMHG | HEART RATE: 66 BPM | SYSTOLIC BLOOD PRESSURE: 122 MMHG | BODY MASS INDEX: 22.31 KG/M2

## 2019-06-10 NOTE — PROGRESS NOTES
[facility]  [place] Medical Record Number:  [id number]  Place of Service where encounter took place:  [facility] ([facility]) [[id number]]  Chief Complaint   Patient presents with     RECHECK       HPI:    Zoraida Olivarez  is a 85 year old (4/11/1934), who is being seen today for an episodic care visit.  HPI information obtained from: facility chart records, facility staff, patient report and [facility] chart review. Today's concern is:  1. Generalized muscle weakness    2. Chronic obstructive pulmonary disease, unspecified COPD type (H)    3. Late onset Alzheimer's disease without behavioral disturbance    4. Weight loss    5. Dysphagia, unspecified type      Ms. Olivarez was visited today secondary to concerns of weakness. Daughter reports that Dylan had one episode of emesis over the weekend with limited oral intake, thus stayed in bed over the weekend.     Today Ms. Olivarez reports no further nausea or emesis. She didn't sleep well last evening but unable to report why. Denies pain or shortness of breath. Did not have oxygen on upon entering her room, oxygen saturations while at rest on room air were 86%. When the oxygen was applied, her saturations improved to 95%.    Past Medical and Surgical History reviewed in Epic today.    MEDICATIONS:  Current Outpatient Medications   Medication Sig Dispense Refill     acetaminophen (TYLENOL) 325 MG tablet Take 650 mg by mouth daily as needed for mild pain       albuterol (PROAIR HFA/PROVENTIL HFA/VENTOLIN HFA) 108 (90 Base) MCG/ACT inhaler Inhale 2 puffs into the lungs every 4 hours as needed for shortness of breath / dyspnea or wheezing       ammonium lactate (AMLACTIN) 12 % external cream Apply topically daily as needed for dry skin       ASPIRIN ADULT LOW STRENGTH 81 MG EC tablet TAKE 1 TABLET BY MOUTH ONCE DAILY 30 tablet 11     escitalopram (LEXAPRO) 10 MG tablet TAKE 1 TABLET BY MOUTH EVERY MORNING 30  tablet 11     estradiol (ESTRACE) 0.5 MG tablet TAKE ONE-HALF TABLET (0.25MG) BY MOUTH ONCE DAILY 30 tablet 11     fluticasone-salmeterol (ADVAIR) 250-50 MCG/DOSE inhaler INHALE 1 PUFF BY MOUTH TWICE DAILY 1 Inhaler 11     guaiFENesin (ROBITUSSIN) 100 MG/5ML SYRP Take 30 mLs by mouth 2 times daily as needed for cough       ipratropium - albuterol 0.5 mg/2.5 mg/3 mL (DUONEB) 0.5-2.5 (3) MG/3ML neb solution NEBULIZE THE CONTENT OF 1 VIAL INTO THE LUNGS TWICE DAILY;AND NEBULIZE THE CONTENT OF 1 VIAL INTO THE LUNGS EVERY 4 HOURS AS NEEDED 180 mL 11     levETIRAcetam (KEPPRA) 250 MG tablet TAKE 1 TABLET BY MOUTH ONCE DAILY 30 tablet 11     melatonin 3 MG tablet TAKE 1 TABLET BY MOUTH ONCE DAILY 30 tablet 11     NUTRITIONAL SUPPLEMENT LIQD Take by mouth 3 times daily       Nutritional Supplements (ENSURE PLUS) LIQD Take by mouth 2 times daily       polyethylene glycol (MIRALAX/GLYCOLAX) packet Take 17 g by mouth daily       propranolol (INDERAL) 20 MG tablet TAKE 1 TABLET BY MOUTH TWICE DAILY 60 tablet 11     rosuvastatin (CRESTOR) 20 MG tablet TAKE 1 TABLET BY MOUTH ONCE DAILY 30 tablet 11     Sennosides (EQ NATURAL VEGETABLE LAXATIVE PO) Take 8.6 mg by mouth daily as needed       SPIRIVA HANDIHALER 18 MCG inhaled capsule INHALE THE CONTENTS OF ONE CAPSULE ONCE DAILY USING HANDIHALER DEVICE 30 capsule 11     REVIEW OF SYSTEMS:  Limited secondary to cognitive impairment but today pt reports no pain or shortness of breath    Objective:  /69   Pulse 66   Wt 59.9 kg (132 lb)   BMI 22.31 kg/m    Exam:  GENERAL APPEARANCE:  Alert, pleasant, cooperative  EYES: no discharge or mattering on lids or lashes noted  ENT:  dry mucous membranes, hearing acuity intact  RESP: no respiratory distress, Lung sounds clear, diminished in bases, patient is on 2 liters per nasal cannula.  CV:  rate and rhythm regular, no murmur. Edema none in bilateral lower extremities. ABDOMEN: normal bowel sounds, soft, nontender.  M/S:   Gait and  station: ambulates independently with walker, no tenderness or swelling of the joints; able to move all extremities   SKIN:  warm and dry.   NEURO: no facial asymmetry, no speech deficits and able to follow directions, moves all extremities symmetrically  PSYCH:  insight, judgement, and memory impaired affect and mood normal    Labs:   BMP and CBC pending for today    CBC RESULTS:   Recent Labs   Lab Test 05/01/19   WBC 9.5   RBC 5.43*   HGB 15.8*   HCT 47.9*   MCV 88   MCH 29.1   MCHC 33.0   RDW 13.8   *       Last Basic Metabolic Panel:  Recent Labs   Lab Test 05/01/19   *   POTASSIUM 3.3*   CHLORIDE 109*   DIANE 9.6   CO2 27   BUN 23   CR 0.85   GLC 92       ASSESSMENT/PLAN:  (M62.81) Generalized weakness  Comment: had emesis over the weekend so likely dehydration causing the symptoms. She is more alert and out of bed today so improving.   Plan: BMP pending for today  --240 ml of water with each med pass and at each meal.     (J44.9) Chronic obstructive pulmonary disease, unspecified COPD type (H)   Comment: no wheezing on exam. Now has oxygen and tolerating well but doesn't like the nasal cannula. Oxygen sats on room air 86% which increased to 95% while on oxygen. Current oxygen system is not allowing for easy transport out of room or out of facility. She would benefit from a liquid concentrator and portable tank so will work with staff on finding another company that can supply the liquid oxygen so she can receive the assistance she needs during mealtime. The concentrator she currently has in her room is very loud, interrupting her sleep cycle.    Plan:   --Duonebs BID  --continue with oxygen 2 liters per nasal cannula continuously   --staff to find a company with liquid oxygen    (R63.4) Weight loss  (R13.10) Dysphagia, unspecified type  Comment: weights 132 lb which is close to admission weight in March of 135 lb. There was a recorded weight of 120 lbs last month which may have been an error as her  weight is now recorded as 132 lb. Family elected to hold off on video swallow as she is currently on a pureed diet. Resident reports today that she does not like the current diet, thus the weight loss. She needs encouragement with feeding which has been difficult given she was not able to leave her room for the past few days because the oxygen tanks were empty.   Plan:  --staff to encourage and assist at mealtimes.   --magic cup BID  --monitor weights. May need to consider mirtazapine for appetite stimulation in the future.     Electronically signed by:  CYNDI Miramontes CNP       Face to Face and Medical Necessity Statement for DME Provider visit    Demographic Information on Zoraida Olivarez:  Gender: female  : 1934  [facility]  [address] [place] [address]  [phone] (home) [phone] (work)    Medical Record: [id number]  Social Security Number: [id number]  Primary Care Provider: Lynn Gallagher  Insurance: Payor: MEDICARE / Plan: MEDICARE / Product Type: Medicare /     HPI:   Zoraida Olivarez is a 85 year old  (1934), who is being seen today for a face to face provider visit at [facility]; medical necessity statement for DME included. This patient requires the following:  DME Ordered and Medical Necessity Statement   Oxygen: 2 L/min via nasal cannula continuous with portability ; Clinical Documentation: (Obtain documented RA sat with in 2 days of discharge in acute setting or within 30 days of provider visit):  Method 1: Room air at rest: Qualifing sat level is < 88% or below on room air at rest on 6/10/19     Pt needing above DME with expected length of need of lifetime use due to medical necessity associated with following diagnosis:     Chronic obstructive pulmonary disease, unspecified COPD type (H)    PMH   has a past medical history of Cerebral aneurysm, Chronic obstructive lung disease (H), Hearing loss, sensorineural,  combined types, History of shingles, Hyperlipidemia, Ileitis, Incomplete RBBB, Major depression, Memory loss, PAD (peripheral artery disease) (H), Polyp of colon, adenomatous, Skin cancer of arm, Tobacco abuse, and Tremor, essential.    ROS:  Please see above.     EXAM  Vitals: /69   Pulse 66   Wt 59.9 kg (132 lb)   BMI 22.31 kg/m  ;BMI= Body mass index is 22.31 kg/m .   Please see above    ASSESSMENT/PLAN:  1. Generalized muscle weakness    2. Chronic obstructive pulmonary disease, unspecified COPD type (H)    3. Late onset Alzheimer's disease without behavioral disturbance    4. Weight loss    5. Dysphagia, unspecified type        Orders:  1. Facility staff/TC to contact DME company to get their order form for provider to fill out    ELECTRONICALLY SIGNED BY GILDARDO CERTIFIED PROVIDER:  CYNDI Miramontes CNP   NPI: [id number]  [facility]  [address] [place] [address], [address]  [place] [address]

## 2019-06-11 ENCOUNTER — TRANSFERRED RECORDS (OUTPATIENT)
Dept: HEALTH INFORMATION MANAGEMENT | Facility: CLINIC | Age: 84
End: 2019-06-11

## 2019-06-11 ENCOUNTER — ASSISTED LIVING VISIT (OUTPATIENT)
Dept: GERIATRICS | Facility: CLINIC | Age: 84
End: 2019-06-11
Payer: MEDICARE

## 2019-06-11 ENCOUNTER — RECORDS - HEALTHEAST (OUTPATIENT)
Dept: LAB | Facility: CLINIC | Age: 84
End: 2019-06-11

## 2019-06-11 DIAGNOSIS — R63.4 WEIGHT LOSS: ICD-10-CM

## 2019-06-11 DIAGNOSIS — G30.1 LATE ONSET ALZHEIMER'S DISEASE WITHOUT BEHAVIORAL DISTURBANCE (H): ICD-10-CM

## 2019-06-11 DIAGNOSIS — J44.9 CHRONIC OBSTRUCTIVE PULMONARY DISEASE, UNSPECIFIED COPD TYPE (H): ICD-10-CM

## 2019-06-11 DIAGNOSIS — M62.81 GENERALIZED MUSCLE WEAKNESS: Primary | ICD-10-CM

## 2019-06-11 DIAGNOSIS — R13.10 DYSPHAGIA, UNSPECIFIED TYPE: ICD-10-CM

## 2019-06-11 DIAGNOSIS — F02.80 LATE ONSET ALZHEIMER'S DISEASE WITHOUT BEHAVIORAL DISTURBANCE (H): ICD-10-CM

## 2019-06-11 LAB
ANION GAP SERPL CALCULATED.3IONS-SCNC: 7 MMOL/L (ref 5–18)
ANION GAP SERPL CALCULATED.3IONS-SCNC: 7 MMOL/L (ref 5–18)
BUN SERPL-MCNC: 14 MG/DL (ref 8–28)
BUN SERPL-MCNC: 14 MG/DL (ref 8–28)
CALCIUM SERPL-MCNC: 9.4 MG/DL (ref 8.5–10.5)
CALCIUM SERPL-MCNC: 9.4 MG/DL (ref 8.5–10.5)
CHLORIDE BLD-SCNC: 104 MMOL/L (ref 98–107)
CHLORIDE SERPLBLD-SCNC: 104 MMOL/L (ref 98–107)
CO2 SERPL-SCNC: 29 MMOL/L (ref 22–31)
CO2 SERPL-SCNC: 29 MMOL/L (ref 22–31)
CREAT SERPL-MCNC: 0.71 MG/DL (ref 0.6–1.1)
CREAT SERPL-MCNC: 0.71 MG/DL (ref 0.6–1.1)
ERYTHROCYTE [DISTWIDTH] IN BLOOD BY AUTOMATED COUNT: 14.6 % (ref 11–14.5)
ERYTHROCYTE [DISTWIDTH] IN BLOOD BY AUTOMATED COUNT: 14.6 % (ref 11–14.5)
GFR SERPL CREATININE-BSD FRML MDRD: >60 ML/MIN/1.73M2
GFR SERPL CREATININE-BSD FRML MDRD: >60 ML/MIN/1.73M2
GLUCOSE BLD-MCNC: 114 MG/DL (ref 70–125)
GLUCOSE SERPL-MCNC: 114 MG/DL (ref 70–125)
HCT VFR BLD AUTO: 44.8 % (ref 35–47)
HCT VFR BLD AUTO: 44.8 % (ref 35–47)
HEMOGLOBIN: 14.3 G/DL (ref 12–16)
HGB BLD-MCNC: 14.3 G/DL (ref 12–16)
MCH RBC QN AUTO: 28.4 PG (ref 27–34)
MCH RBC QN AUTO: 28.4 PG (ref 27–34)
MCHC RBC AUTO-ENTMCNC: 31.9 G/DL (ref 32–36)
MCHC RBC AUTO-ENTMCNC: 31.9 G/DL (ref 32–36)
MCV RBC AUTO: 89 FL (ref 80–100)
MCV RBC AUTO: 89 FL (ref 80–100)
PLATELET # BLD AUTO: 530 THOU/UL (ref 140–440)
PLATELET # BLD AUTO: 530 THOU/UL (ref 140–440)
PMV BLD AUTO: 9.4 FL (ref 8.5–12.5)
POTASSIUM BLD-SCNC: 3.6 MMOL/L (ref 3.5–5)
POTASSIUM SERPL-SCNC: 3.6 MMOL/L (ref 3.5–5)
RBC # BLD AUTO: 5.03 MILL/UL (ref 3.8–5.4)
RBC # BLD AUTO: 5.03 MILL/UL (ref 3.8–5.4)
SODIUM SERPL-SCNC: 140 MMOL/L (ref 136–145)
SODIUM SERPL-SCNC: 140 MMOL/L (ref 136–145)
WBC # BLD AUTO: 8.9 THOU/UL (ref 4–11)
WBC: 8.9 THOU/UL (ref 4–11)

## 2019-06-11 NOTE — LETTER
6/11/2019        RE: Zoraida Olivarez  [facility]  [address]  [facility] [address]        [facility]  [place] Medical Record Number:  [id number]  [facility] where encounter took place[facility] [[id number]]  Chief Complaint   Patient presents with     RECHECK       HPI:    Zoraida Olivarez  is a 85 year old (4/11/1934), who is being seen today for an episodic care visit.  HPI information obtained from: facility chart records, facility staff, patient report and [facility] chart review. Today's concern is:  1. Generalized muscle weakness    2. Chronic obstructive pulmonary disease, unspecified COPD type (H)    3. Late onset Alzheimer's disease without behavioral disturbance    4. Weight loss    5. Dysphagia, unspecified type      Ms. Olivarez was visited today secondary to concerns of weakness. Daughter reports that Dylan had one episode of emesis over the weekend with limited oral intake, thus stayed in bed over the weekend.     Today Ms. Olivarez reports no further nausea or emesis. She didn't sleep well last evening but unable to report why. Denies pain or shortness of breath.     Past Medical and Surgical History reviewed in Epic today.    MEDICATIONS:  Current Outpatient Medications   Medication Sig Dispense Refill     acetaminophen (TYLENOL) 325 MG tablet Take 650 mg by mouth daily as needed for mild pain       albuterol (PROAIR HFA/PROVENTIL HFA/VENTOLIN HFA) 108 (90 Base) MCG/ACT inhaler Inhale 2 puffs into the lungs every 4 hours as needed for shortness of breath / dyspnea or wheezing       ammonium lactate (AMLACTIN) 12 % external cream Apply topically daily as needed for dry skin       ASPIRIN ADULT LOW STRENGTH 81 MG EC tablet TAKE 1 TABLET BY MOUTH ONCE DAILY 30 tablet 11     escitalopram (LEXAPRO) 10 MG tablet TAKE 1 TABLET BY MOUTH EVERY MORNING 30 tablet 11     estradiol (ESTRACE) 0.5 MG tablet  TAKE ONE-HALF TABLET (0.25MG) BY MOUTH ONCE DAILY 30 tablet 11     fluticasone-salmeterol (ADVAIR) 250-50 MCG/DOSE inhaler INHALE 1 PUFF BY MOUTH TWICE DAILY 1 Inhaler 11     guaiFENesin (ROBITUSSIN) 100 MG/5ML SYRP Take 30 mLs by mouth 2 times daily as needed for cough       ipratropium - albuterol 0.5 mg/2.5 mg/3 mL (DUONEB) 0.5-2.5 (3) MG/3ML neb solution NEBULIZE THE CONTENT OF 1 VIAL INTO THE LUNGS TWICE DAILY;AND NEBULIZE THE CONTENT OF 1 VIAL INTO THE LUNGS EVERY 4 HOURS AS NEEDED 180 mL 11     levETIRAcetam (KEPPRA) 250 MG tablet TAKE 1 TABLET BY MOUTH ONCE DAILY 30 tablet 11     melatonin 3 MG tablet TAKE 1 TABLET BY MOUTH ONCE DAILY 30 tablet 11     NUTRITIONAL SUPPLEMENT LIQD Take by mouth 3 times daily       Nutritional Supplements (ENSURE PLUS) LIQD Take by mouth 2 times daily       polyethylene glycol (MIRALAX/GLYCOLAX) packet Take 17 g by mouth daily       propranolol (INDERAL) 20 MG tablet TAKE 1 TABLET BY MOUTH TWICE DAILY 60 tablet 11     rosuvastatin (CRESTOR) 20 MG tablet TAKE 1 TABLET BY MOUTH ONCE DAILY 30 tablet 11     Sennosides (EQ NATURAL VEGETABLE LAXATIVE PO) Take 8.6 mg by mouth daily as needed       SPIRIVA HANDIHALER 18 MCG inhaled capsule INHALE THE CONTENTS OF ONE CAPSULE ONCE DAILY USING HANDIHALER DEVICE 30 capsule 11     REVIEW OF SYSTEMS:  Limited secondary to cognitive impairment but today pt reports no pain or shortness of breath    Objective:  /69   Pulse 66   Wt 59.9 kg (132 lb)   BMI 22.31 kg/m     Exam:  GENERAL APPEARANCE:  Alert, pleasant, cooperative  EYES: no discharge or mattering on lids or lashes noted  ENT:  dry mucous membranes, hearing acuity intact  RESP: no respiratory distress, Lung sounds clear, diminished in bases, patient is on room air  CV:  rate and rhythm regular, no murmur. Edema none in bilateral lower extremities. ABDOMEN: normal bowel sounds, soft, nontender.  M/S:   Gait and station: ambulates independently with walker, no tenderness or  swelling of the joints; able to move all extremities   SKIN:  warm and dry.   NEURO: no facial asymmetry, no speech deficits and able to follow directions, moves all extremities symmetrically  PSYCH:  insight, judgement, and memory impaired affect and mood normal    Labs:   BMP and CBC pending for today    CBC RESULTS:   Recent Labs   Lab Test 05/01/19   WBC 9.5   RBC 5.43*   HGB 15.8*   HCT 47.9*   MCV 88   MCH 29.1   MCHC 33.0   RDW 13.8   *       Last Basic Metabolic Panel:  Recent Labs   Lab Test 05/01/19   *   POTASSIUM 3.3*   CHLORIDE 109*   DIANE 9.6   CO2 27   BUN 23   CR 0.85   GLC 92       ASSESSMENT/PLAN:  (M62.81) Generalized weakness  Comment: had emesis over the weekend so likely dehydration causing the symptoms. She is more alert and out of bed today so improving.   Plan: BMP pending for today  --240 ml of water with each med pass and at each meal.     (J44.9) Chronic obstructive pulmonary disease, unspecified COPD type (H)   Comment: no wheezing on exam. Now has oxygen and tolerating well but doesn't like the nasal cannula. Oxygen sats on room air 87% which increased to 95% while on oxygen.   Plan:   --Duonebs BID  --continue with oxygen 2 liters per nasal cannula continuously     (R63.4) Weight loss  (R13.10) Dysphagia, unspecified type  Comment:  weights 132 lb which is close to admission weight in March of 135 lb. There was a recorded weight of 120 lbs last month which may have been an error as her weight is now recorded as 132 lb. Family elected to hold off on video swallow as she is currently on a pureed diet. Resident reports today that she does not like the current diet, thus the weight loss.   Plan:  --staff to encourage and assist at mealtimes.   --magic cup BID  --monitor weights. May need to consider mirtazapine for appetite stimulation in the future.     Electronically signed by:  CYNDI Miramontes CNP             Sincerely,        CYNDI Miramontes CNP

## 2019-06-28 DIAGNOSIS — J44.9 CHRONIC OBSTRUCTIVE PULMONARY DISEASE, UNSPECIFIED COPD TYPE (H): Primary | ICD-10-CM

## 2019-07-11 ENCOUNTER — AMBULATORY - HEALTHEAST (OUTPATIENT)
Dept: OTHER | Facility: CLINIC | Age: 84
End: 2019-07-11

## 2019-07-11 ENCOUNTER — DOCUMENTATION ONLY (OUTPATIENT)
Dept: OTHER | Facility: CLINIC | Age: 84
End: 2019-07-11

## 2019-10-04 ENCOUNTER — HEALTH MAINTENANCE LETTER (OUTPATIENT)
Age: 84
End: 2019-10-04

## 2019-11-25 PROBLEM — Z74.09 IMPAIRED MOBILITY AND ACTIVITIES OF DAILY LIVING: Status: ACTIVE | Noted: 2019-02-21

## 2019-11-25 PROBLEM — I67.1 CEREBRAL ANEURYSM: Status: ACTIVE | Noted: 2019-11-25

## 2019-11-25 PROBLEM — I45.10 INCOMPLETE RBBB: Status: ACTIVE | Noted: 2019-11-25

## 2019-11-25 PROBLEM — I48.91 ATRIAL FIBRILLATION WITH RVR (H): Status: ACTIVE | Noted: 2019-02-12

## 2019-11-25 PROBLEM — Z91.81 HISTORY OF FALL: Status: ACTIVE | Noted: 2019-02-21

## 2019-11-25 PROBLEM — R82.90 ABNORMAL URINALYSIS: Status: ACTIVE | Noted: 2019-03-11

## 2019-11-25 PROBLEM — R19.7 DIARRHEA: Status: ACTIVE | Noted: 2019-02-12

## 2019-11-25 PROBLEM — E86.9 VOLUME DEPLETION: Status: ACTIVE | Noted: 2019-02-12

## 2019-11-25 PROBLEM — Z78.9 IMPAIRED MOBILITY AND ACTIVITIES OF DAILY LIVING: Status: ACTIVE | Noted: 2019-02-21

## 2019-11-25 PROBLEM — F03.90 DEMENTIA WITHOUT BEHAVIORAL DISTURBANCE (H): Status: ACTIVE | Noted: 2019-02-27

## 2019-11-25 PROBLEM — E87.6 ACUTE HYPOKALEMIA: Status: ACTIVE | Noted: 2019-02-12

## 2019-11-25 PROBLEM — R41.89 COGNITIVE IMPAIRMENT: Status: ACTIVE | Noted: 2019-02-21

## 2019-11-25 RX ORDER — MORPHINE SULFATE 30 MG/1
2.5 TABLET ORAL
COMMUNITY
End: 2020-02-14

## 2019-11-25 RX ORDER — LORAZEPAM 0.5 MG/1
0.5 TABLET ORAL EVERY 4 HOURS PRN
COMMUNITY
End: 2020-02-14

## 2019-11-25 RX ORDER — PREDNISONE 10 MG/1
10 TABLET ORAL DAILY
COMMUNITY
End: 2020-07-03

## 2019-11-25 RX ORDER — BISACODYL 10 MG
10 SUPPOSITORY, RECTAL RECTAL DAILY PRN
COMMUNITY
End: 2021-11-07

## 2019-11-25 RX ORDER — HALOPERIDOL 0.5 MG/1
2.5 TABLET ORAL EVERY 6 HOURS PRN
COMMUNITY
End: 2020-02-14

## 2019-11-25 NOTE — PROGRESS NOTES
[facility]  [place] Medical Record Number:  [id number]  Place of Service where encounter took place:  [facility]  [place] ([facility]) [[id number]]  Chief Complaint   Patient presents with     RECHECK       HPI:    Zoraida Olivarez  is a 85 year old (4/11/1934), who is being seen today for an episodic care visit.  HPI information obtained from: facility chart records, facility staff, patient report and [facility] chart review.    Ms. Olivarez was visited today while resting in bed. She reports feeling well. Denies pain, chest pain or shortness of breath. She rarely uses the oxygen any longer. Has had a fair appetite. Sleeping well at night. Ambulating with walker. Has had no falls, calm and pleasant with staff.     Past Medical and Surgical History reviewed in Epic today.    MEDICATIONS:  Current Outpatient Medications   Medication Sig Dispense Refill     acetaminophen (TYLENOL) 325 MG suppository Place 650 mg rectally every 4 hours as needed for fever       acetaminophen (TYLENOL) 325 MG tablet Take 650 mg by mouth daily as needed for mild pain       amLODIPine (NORVASC) 2.5 MG tablet TAKE 1 TABLET BY MOUTH EVERY MORNING 30 tablet 10     ammonium lactate (AMLACTIN) 12 % external cream Apply topically daily as needed for dry skin       ASPIRIN ADULT LOW STRENGTH 81 MG EC tablet TAKE 1 TABLET BY MOUTH ONCE DAILY 30 tablet 11     atropine 1 % SOLN Place 2 drops under the tongue every 2 hours as needed for secretions       bisacodyl (DULCOLAX) 10 MG suppository Place 10 mg rectally daily as needed for constipation       escitalopram (LEXAPRO) 10 MG tablet TAKE 1 TABLET BY MOUTH EVERY MORNING 30 tablet 11     estradiol (ESTRACE) 0.5 MG tablet TAKE ONE-HALF TABLET (0.25MG) BY MOUTH ONCE DAILY 30 tablet 11     guaiFENesin (ROBITUSSIN) 100 MG/5ML SYRP Take 30 mLs by mouth 2 times daily as needed for cough       haloperidol (HALDOL) 0.5 MG tablet Take 2.5 mg by mouth every  6 hours as needed for agitation       ipratropium - albuterol 0.5 mg/2.5 mg/3 mL (DUONEB) 0.5-2.5 (3) MG/3ML neb solution NEBULIZE THE CONTENT OF 1 VIAL INTO THE LUNGS TWICE DAILY;AND NEBULIZE THE CONTENT OF 1 VIAL INTO THE LUNGS EVERY 4 HOURS AS NEEDED 180 mL 11     levETIRAcetam (KEPPRA) 250 MG tablet TAKE 1 TABLET BY MOUTH ONCE DAILY 30 tablet 11     LORazepam (ATIVAN) 0.5 MG tablet Take 0.5 mg by mouth every 4 hours as needed for anxiety       melatonin 3 MG tablet TAKE 1 TABLET BY MOUTH ONCE DAILY 30 tablet 11     morphine 2.5 MG solu-tab Take 2.5 mg by mouth every 2 hours as needed for shortness of breath / dyspnea or moderate to severe pain       NUTRITIONAL SUPPLEMENT LIQD Take by mouth 2 times daily        Nutritional Supplements (ENSURE PLUS) LIQD Take by mouth 2 times daily       polyethylene glycol (MIRALAX/GLYCOLAX) powder MIX 17GM OF POWDER IN 8OZ OF WATER UNTIL COMPLETELY DISSOLVED. DRINK SOLUTION BY MOUTH ONCE DAILY 510 g 11     predniSONE (DELTASONE) 10 MG tablet Take 10 mg by mouth daily       Sennosides (EQ NATURAL VEGETABLE LAXATIVE PO) Take 8.6 mg by mouth daily as needed       albuterol (PROAIR HFA/PROVENTIL HFA/VENTOLIN HFA) 108 (90 Base) MCG/ACT inhaler Inhale 2 puffs into the lungs every 4 hours as needed for shortness of breath / dyspnea or wheezing       fluticasone-salmeterol (ADVAIR) 250-50 MCG/DOSE inhaler INHALE 1 PUFF BY MOUTH TWICE DAILY 1 Inhaler 11     polyethylene glycol (MIRALAX/GLYCOLAX) packet Take 17 g by mouth daily       propranolol (INDERAL) 10 MG tablet TAKE 1 TABLET BY MOUTH TWICE DAILY 60 tablet 11     propranolol (INDERAL) 20 MG tablet TAKE 1 TABLET BY MOUTH TWICE DAILY 60 tablet 11     rosuvastatin (CRESTOR) 20 MG tablet TAKE 1 TABLET BY MOUTH ONCE DAILY 30 tablet 11     SPIRIVA HANDIHALER 18 MCG inhaled capsule INHALE THE CONTENTS OF ONE CAPSULE ONCE DAILY USING HANDIHALER DEVICE 30 capsule 11     Case Management:  I have reviewed the Assisted Living care plan,  current immunizations and preventive care/cancer screening.. Future cancer screening is not clinically indicated secondary to age/goals of care.   Patient's desire to return to the community is not present.    Advance Directive Discussion:    I reviewed the current advanced directives as reflected in EPIC, the POLST and the facility chart, and verified the congruency of orders.  I contacted the first party [facility] and left a message regarding the plan of Care. I did review the advance directives with the resident.     Team Discussion:  I communicated with the appropriate disciplines involved with the Plan of Care:   Nursing      Patient Goal:  Patient's goal is pain control and comfort.    Immunizations:  Most Recent Immunizations   Administered Date(s) Administered     DTaP, Unspecified 06/27/2011     Flu, Unspecified 09/30/2015     Influenza (High Dose) 3 valent vaccine 09/25/2018     Influenza (IIV3) PF 11/01/2012, 11/01/2012     Influenza Vaccine IM > 6 months Valent IIV4 09/13/2013     Influenza Vaccine IM Ages 6-35 Months 4 Valent (PF) 09/13/2013     Pneumo Conj 13-V (2010&after) 01/26/2015     Pneumococcal 23 valent 11/01/2012     TDAP Vaccine (Adacel) 06/27/2011     Td (Adult), Adsorbed 06/09/2008     Above immunizations pulled from [facility]. [facility] and facility records also reconciled. Outstanding information sent to  to update [facility].  Future immunizations are not needed at this point as all recommended immunizations are up to date.   Information reviewed:  Medications, vital signs, orders, and nursing notes.      REVIEW OF SYSTEMS:  4 point ROS including Respiratory, CV, GI and , other than that noted in the HPI,  is negative    Objective:  /89   Pulse 69   Temp 97.3  F (36.3  C)   Resp 18   Wt 61.2 kg (135 lb)   BMI 22.81 kg/m    Exam:  GENERAL APPEARANCE:  Alert, pleasant, cooperative  EYES: no discharge or mattering on lids or lashes noted  ENT:  moist mucous  membranes, hearing acuity intact  RESP: no respiratory distress, Lung sounds clear, diminished in bases, patient is on 2 liters per nasal cannula.  CV:  rate and rhythm regular, no murmur. Edema none in bilateral lower extremities. ABDOMEN: normal bowel sounds, soft, nontender.  M/S:   Gait and station: ambulates independently with walker, no tenderness or swelling of the joints; able to move all extremities   SKIN:  warm and dry.   NEURO: no facial asymmetry, no speech deficits and able to follow directions, moves all extremities symmetrically  PSYCH:  insight, judgement, and memory impaired affect and mood normal    Labs:   CBC RESULTS:   Recent Labs   Lab Test 06/11/19 05/01/19   WBC 8.9 9.5   RBC 5.03 5.43*   HGB 14.3 15.8*   HCT 44.8 47.9*   MCV 89 88   MCH 28.4 29.1   MCHC 31.9* 33.0   RDW 14.6* 13.8   * 527*       Last Basic Metabolic Panel:  Recent Labs   Lab Test 06/11/19 05/01/19    147*   POTASSIUM 3.6 3.3*   CHLORIDE 104 109*   DIANE 9.4 9.6   CO2 29 27   BUN 14 23   CR 0.71 0.85    92       ASSESSMENT/PLAN:  (J44.9) Chronic obstructive pulmonary disease, unspecified COPD type (H)  (primary encounter diagnosis)  (Z51.5) Hospice care patient  Comment: no wheezing or s/sx of exacerbation. Uses oxygen intermittently.   Plan: duonebs a4h PRN  --oxygen PRN  --prednisone 10 mg daily    (I10) Benign essential hypertension  Comment: controlled blood pressure with reading as noted above.   Plan: continue with amlodipine 2.5 mg daily     (G30.1,  F02.80) Late onset Alzheimer's disease without behavioral disturbance (H)  Comment: chronic, stable. Able to do self cares and ambulates independently with walker.    Plan: continue with memory care assisted living for assistance with cares, meals and medications.     (R13.10) Dysphagia, unspecified type  Comment: stable.   Plan: continue with pureed diet and thin liquids. Crush meds with applesauce.     Electronically signed by:  CYNDI Miramontes  CNP

## 2019-11-26 ENCOUNTER — ASSISTED LIVING VISIT (OUTPATIENT)
Dept: GERIATRICS | Facility: CLINIC | Age: 84
End: 2019-11-26
Payer: MEDICARE

## 2019-11-26 VITALS
BODY MASS INDEX: 22.81 KG/M2 | RESPIRATION RATE: 18 BRPM | DIASTOLIC BLOOD PRESSURE: 89 MMHG | HEART RATE: 69 BPM | WEIGHT: 135 LBS | TEMPERATURE: 97.3 F | SYSTOLIC BLOOD PRESSURE: 128 MMHG

## 2019-11-26 DIAGNOSIS — F02.80 LATE ONSET ALZHEIMER'S DISEASE WITHOUT BEHAVIORAL DISTURBANCE (H): ICD-10-CM

## 2019-11-26 DIAGNOSIS — I10 BENIGN ESSENTIAL HYPERTENSION: ICD-10-CM

## 2019-11-26 DIAGNOSIS — Z51.5 HOSPICE CARE PATIENT: ICD-10-CM

## 2019-11-26 DIAGNOSIS — G30.1 LATE ONSET ALZHEIMER'S DISEASE WITHOUT BEHAVIORAL DISTURBANCE (H): ICD-10-CM

## 2019-11-26 DIAGNOSIS — J44.9 CHRONIC OBSTRUCTIVE PULMONARY DISEASE, UNSPECIFIED COPD TYPE (H): Primary | ICD-10-CM

## 2019-11-26 DIAGNOSIS — R13.10 DYSPHAGIA, UNSPECIFIED TYPE: ICD-10-CM

## 2019-11-26 PROCEDURE — 99207 ZZC CDG-CODE CATEGORY CHANGED: CPT | Performed by: NURSE PRACTITIONER

## 2019-11-26 NOTE — LETTER
11/26/2019        RE: Zoraida Olivarez  [facility]  [address] [facility] [address]        [facility]  [place] [facility] Record Number:  [id number]  Place of Service where encounter took place:  [facility] ([facility]) [[id number]]  Chief Complaint   Patient presents with     RECHECK       HPI:    Zoraida Olivarez  is a 85 year old (4/11/1934), who is being seen today for an episodic care visit.  HPI information obtained from: facility chart records, facility staff, patient report and [facility] chart review.    Ms. Olivarez was visited today while resting in bed. She reports feeling well. Denies pain, chest pain or shortness of breath. She rarely uses the oxygen any longer. Has had a fair appetite. Sleeping well at night. Ambulating with walker. Has had no falls, calm and pleasant with staff.     Past Medical and Surgical History reviewed in Epic today.    MEDICATIONS:  Current Outpatient Medications   Medication Sig Dispense Refill     acetaminophen (TYLENOL) 325 MG suppository Place 650 mg rectally every 4 hours as needed for fever       acetaminophen (TYLENOL) 325 MG tablet Take 650 mg by mouth daily as needed for mild pain       amLODIPine (NORVASC) 2.5 MG tablet TAKE 1 TABLET BY MOUTH EVERY MORNING 30 tablet 10     ammonium lactate (AMLACTIN) 12 % external cream Apply topically daily as needed for dry skin       ASPIRIN ADULT LOW STRENGTH 81 MG EC tablet TAKE 1 TABLET BY MOUTH ONCE DAILY 30 tablet 11     atropine 1 % SOLN Place 2 drops under the tongue every 2 hours as needed for secretions       bisacodyl (DULCOLAX) 10 MG suppository Place 10 mg rectally daily as needed for constipation       escitalopram (LEXAPRO) 10 MG tablet TAKE 1 TABLET BY MOUTH EVERY MORNING 30 tablet 11     estradiol (ESTRACE) 0.5 MG tablet TAKE ONE-HALF TABLET (0.25MG) BY MOUTH ONCE DAILY 30 tablet 11     guaiFENesin (ROBITUSSIN) 100 MG/5ML SYRP  Take 30 mLs by mouth 2 times daily as needed for cough       haloperidol (HALDOL) 0.5 MG tablet Take 2.5 mg by mouth every 6 hours as needed for agitation       ipratropium - albuterol 0.5 mg/2.5 mg/3 mL (DUONEB) 0.5-2.5 (3) MG/3ML neb solution NEBULIZE THE CONTENT OF 1 VIAL INTO THE LUNGS TWICE DAILY;AND NEBULIZE THE CONTENT OF 1 VIAL INTO THE LUNGS EVERY 4 HOURS AS NEEDED 180 mL 11     levETIRAcetam (KEPPRA) 250 MG tablet TAKE 1 TABLET BY MOUTH ONCE DAILY 30 tablet 11     LORazepam (ATIVAN) 0.5 MG tablet Take 0.5 mg by mouth every 4 hours as needed for anxiety       melatonin 3 MG tablet TAKE 1 TABLET BY MOUTH ONCE DAILY 30 tablet 11     morphine 2.5 MG solu-tab Take 2.5 mg by mouth every 2 hours as needed for shortness of breath / dyspnea or moderate to severe pain       NUTRITIONAL SUPPLEMENT LIQD Take by mouth 2 times daily        Nutritional Supplements (ENSURE PLUS) LIQD Take by mouth 2 times daily       polyethylene glycol (MIRALAX/GLYCOLAX) powder MIX 17GM OF POWDER IN 8OZ OF WATER UNTIL COMPLETELY DISSOLVED. DRINK SOLUTION BY MOUTH ONCE DAILY 510 g 11     predniSONE (DELTASONE) 10 MG tablet Take 10 mg by mouth daily       Sennosides (EQ NATURAL VEGETABLE LAXATIVE PO) Take 8.6 mg by mouth daily as needed       albuterol (PROAIR HFA/PROVENTIL HFA/VENTOLIN HFA) 108 (90 Base) MCG/ACT inhaler Inhale 2 puffs into the lungs every 4 hours as needed for shortness of breath / dyspnea or wheezing       fluticasone-salmeterol (ADVAIR) 250-50 MCG/DOSE inhaler INHALE 1 PUFF BY MOUTH TWICE DAILY 1 Inhaler 11     polyethylene glycol (MIRALAX/GLYCOLAX) packet Take 17 g by mouth daily       propranolol (INDERAL) 10 MG tablet TAKE 1 TABLET BY MOUTH TWICE DAILY 60 tablet 11     propranolol (INDERAL) 20 MG tablet TAKE 1 TABLET BY MOUTH TWICE DAILY 60 tablet 11     rosuvastatin (CRESTOR) 20 MG tablet TAKE 1 TABLET BY MOUTH ONCE DAILY 30 tablet 11     SPIRIVA HANDIHALER 18 MCG inhaled capsule INHALE THE CONTENTS OF ONE CAPSULE  ONCE DAILY USING HANDIHALER DEVICE 30 capsule 11     REVIEW OF SYSTEMS:  4 point ROS including Respiratory, CV, GI and , other than that noted in the HPI,  is negative    Objective:  /89   Pulse 69   Temp 97.3  F (36.3  C)   Resp 18   Wt 61.2 kg (135 lb)   BMI 22.81 kg/m     Exam:  GENERAL APPEARANCE:  Alert, pleasant, cooperative  EYES: no discharge or mattering on lids or lashes noted  ENT:  moist mucous membranes, hearing acuity intact  RESP: no respiratory distress, Lung sounds clear, diminished in bases, patient is on 2 liters per nasal cannula.  CV:  rate and rhythm regular, no murmur. Edema none in bilateral lower extremities. ABDOMEN: normal bowel sounds, soft, nontender.  M/S:   Gait and station: ambulates independently with walker, no tenderness or swelling of the joints; able to move all extremities   SKIN:  warm and dry.   NEURO: no facial asymmetry, no speech deficits and able to follow directions, moves all extremities symmetrically  PSYCH:  insight, judgement, and memory impaired affect and mood normal    Labs:   CBC RESULTS:   Recent Labs   Lab Test 06/11/19 05/01/19   WBC 8.9 9.5   RBC 5.03 5.43*   HGB 14.3 15.8*   HCT 44.8 47.9*   MCV 89 88   MCH 28.4 29.1   MCHC 31.9* 33.0   RDW 14.6* 13.8   * 527*       Last Basic Metabolic Panel:  Recent Labs   Lab Test 06/11/19 05/01/19    147*   POTASSIUM 3.6 3.3*   CHLORIDE 104 109*   DIANE 9.4 9.6   CO2 29 27   BUN 14 23   CR 0.71 0.85    92       ASSESSMENT/PLAN:  (J44.9) Chronic obstructive pulmonary disease, unspecified COPD type (H)  (primary encounter diagnosis)  (Z51.5) Hospice care patient  Comment: no wheezing or s/sx of exacerbation. Uses oxygen intermittently.   Plan: duonebs a4h PRN  --oxygen PRN  --prednisone 10 mg daily    (I10) Benign essential hypertension  Comment: controlled blood pressure with reading as noted above.   Plan: continue with amlodipine 2.5 mg daily     (G30.1,  F02.80) Late onset Alzheimer's  disease without behavioral disturbance (H)  Comment: chronic, stable. Able to do self cares and ambulates independently with walker.    Plan: continue with memory care assisted living for assistance with cares, meals and medications.     (R13.10) Dysphagia, unspecified type  Comment: stable.   Plan: continue with pureed diet and thin liquids. Crush meds with applesauce.     Electronically signed by:  CYNDI Miramontes CNP               Sincerely,        CYNDI Miramontes CNP

## 2020-02-07 ASSESSMENT — MIFFLIN-ST. JEOR: SCORE: 1063.22

## 2020-02-10 ENCOUNTER — HEALTH MAINTENANCE LETTER (OUTPATIENT)
Age: 85
End: 2020-02-10

## 2020-02-12 ENCOUNTER — ASSISTED LIVING VISIT (OUTPATIENT)
Dept: GERIATRICS | Facility: CLINIC | Age: 85
End: 2020-02-12
Payer: MEDICARE

## 2020-02-12 VITALS
OXYGEN SATURATION: 96 % | HEIGHT: 64 IN | RESPIRATION RATE: 17 BRPM | HEART RATE: 93 BPM | TEMPERATURE: 97.2 F | BODY MASS INDEX: 23.83 KG/M2 | DIASTOLIC BLOOD PRESSURE: 91 MMHG | WEIGHT: 139.6 LBS | SYSTOLIC BLOOD PRESSURE: 139 MMHG

## 2020-02-12 DIAGNOSIS — R13.10 DYSPHAGIA, UNSPECIFIED TYPE: ICD-10-CM

## 2020-02-12 DIAGNOSIS — F02.80 LATE ONSET ALZHEIMER'S DISEASE WITHOUT BEHAVIORAL DISTURBANCE (H): ICD-10-CM

## 2020-02-12 DIAGNOSIS — I10 BENIGN ESSENTIAL HYPERTENSION: ICD-10-CM

## 2020-02-12 DIAGNOSIS — G30.1 LATE ONSET ALZHEIMER'S DISEASE WITHOUT BEHAVIORAL DISTURBANCE (H): ICD-10-CM

## 2020-02-12 DIAGNOSIS — J44.9 CHRONIC OBSTRUCTIVE PULMONARY DISEASE, UNSPECIFIED COPD TYPE (H): Primary | ICD-10-CM

## 2020-02-12 NOTE — LETTER
2/12/2020        RE: Zoraida Olivarez  [facility]  [address]  [facility] [address]        [place] [facility]  [place] [facility] Record Number:  [id number]  Place of Service where encounter took place:  [facility] ([facility]) [[id number]]  Chief Complaint   Patient presents with     RECHECK       HPI:    Zoraida Olivarez  is a 85 year old (4/11/1934), who is being seen today for an episodic care visit.  HPI information obtained from: facility chart records, facility staff, patient report and [facility] chart review     Ms. Olivarez was visited today while in her room, rising for the day. She denies pain or shortness of breath. Also reports that she has been sleeping well. She has not had difficulty with chewing or swallowing. Currently ambulating with walker independently. Staff does not have concerns at this time.     Past Medical and Surgical History reviewed in Epic today.    MEDICATIONS:  Current Outpatient Medications   Medication Sig Dispense Refill     acetaminophen (TYLENOL) 325 MG tablet Take 650 mg by mouth every 6 hours as needed for mild pain        amLODIPine (NORVASC) 2.5 MG tablet TAKE 1 TABLET BY MOUTH EVERY MORNING 30 tablet 10     ammonium lactate (AMLACTIN) 12 % external cream Apply topically daily as needed for dry skin       ASPIRIN ADULT LOW STRENGTH 81 MG EC tablet TAKE 1 TABLET BY MOUTH ONCE DAILY 30 tablet 11     bisacodyl (DULCOLAX) 10 MG suppository Place 10 mg rectally daily as needed for constipation       escitalopram (LEXAPRO) 10 MG tablet TAKE 1 TABLET BY MOUTH EVERY MORNING 30 tablet 11     estradiol (ESTRACE) 0.5 MG tablet TAKE ONE-HALF TABLET (0.25MG) BY MOUTH ONCE DAILY 30 tablet 11     levETIRAcetam (KEPPRA) 250 MG tablet TAKE 1 TABLET BY MOUTH ONCE DAILY 30 tablet 11     melatonin 3 MG tablet TAKE 1 TABLET BY MOUTH ONCE DAILY 30 tablet 11     NUTRITIONAL SUPPLEMENT LIQD Take by mouth 3 times daily     "    Nutritional Supplements (ENSURE PLUS) LIQD Take by mouth 2 times daily       polyethylene glycol (MIRALAX/GLYCOLAX) powder MIX 17GM OF POWDER IN 8OZ OF WATER UNTIL COMPLETELY DISSOLVED. DRINK SOLUTION BY MOUTH ONCE DAILY 510 g 11     predniSONE (DELTASONE) 10 MG tablet Take 10 mg by mouth daily       Sennosides (EQ NATURAL VEGETABLE LAXATIVE PO) Take 8.6 mg by mouth 2 times daily as needed        REVIEW OF SYSTEMS:  4 point ROS including Respiratory, CV, GI and , other than that noted in the HPI,  is negative    Objective:  BP (!) 139/91   Pulse 93   Temp 97.2  F (36.2  C)   Resp 17   Ht 1.626 m (5' 4\")   Wt 63.3 kg (139 lb 9.6 oz)   SpO2 96%   BMI 23.96 kg/m     Exam:  GENERAL APPEARANCE:  Alert, pleasant, cooperative  EYES: no discharge or mattering on lids or lashes noted  ENT:  moist mucous membranes, hearing acuity intact  RESP: no respiratory distress, Lung sounds clear, patient is on 2 room air  CV:  rate and rhythm regular, no murmur. Edema none in bilateral lower extremities.   ABDOMEN: normal bowel sounds, soft, nontender.  M/S:   Gait and station: ambulates independently with walker, no tenderness or swelling of the joints; able to move all extremities   SKIN:  warm and dry.   NEURO: no facial asymmetry, no speech deficits and able to follow directions, moves all extremities symmetrically  PSYCH:  insight, judgement, and memory impaired affect and mood normal    Labs:   No recent labs for review as goals of care are comfort     ASSESSMENT/PLAN:  (J44.9) Chronic obstructive pulmonary disease, unspecified COPD type (H)  (primary encounter diagnosis)  Comment: no wheezing or s/sx of exacerbation. Previously using oxygen but now on room air with saturations of 92% which is to be expected with COPD. Was on hospice but did not recertify and was discharged in November. Has since been doing well.   Plan:   --had duonebs q4h PRN but no longer on med list as she was started on prednisone which seems to " be controlling symptoms. Will connect with daughter regarding reordering this so she has this for future use if needed.   --oxygen PRN  --prednisone 10 mg daily. Would consider decreasing to 5 mg daily if daughter agrees.      (I10) Benign essential hypertension  Comment: controlled blood pressure with readings consistently in the 140-150s.   Plan: continue with amlodipine 2.5 mg daily   --Continue to monitor blood pressure and heart rate, adjust medications as needed.     (G30.1,  F02.80) Late onset Alzheimer's disease without behavioral disturbance (H)  Comment: chronic, stable. Able to do self cares and ambulates independently with walker.   mood is stable today.   Plan: continue with memory care assisted living for assistance with cares, meals and medications.   --continue with escitalopram 10 mg daily.      (R13.10) Dysphagia, unspecified type  Comment: stable.  weights have increased back to admission weight of around 137 lb after a loss prior to hospice admission.   Plan: continue with pureed diet and thin liquids. Crush meds with applesauce.       Electronically signed by:  CYNDI Miramontes CNP          Sincerely,        CYNDI Miramontes CNP

## 2020-02-12 NOTE — PROGRESS NOTES
[place] [facility]  [place] [facility] Record Number:  [id number]  Place of Service where encounter took place:  [facility] ([facility]) [[id number]]  Chief Complaint   Patient presents with     RECHECK       HPI:    Zoraida Olivarez  is a 85 year old (4/11/1934), who is being seen today for an episodic care visit.  HPI information obtained from: facility chart records, facility staff, patient report and [facility] chart review     Ms. Olivarez was visited today while in her room, rising for the day. She denies pain or shortness of breath. Also reports that she has been sleeping well. She has not had difficulty with chewing or swallowing. Currently ambulating with walker independently. Staff does not have concerns at this time.     Past Medical and Surgical History reviewed in Epic today.    MEDICATIONS:  Current Outpatient Medications   Medication Sig Dispense Refill     acetaminophen (TYLENOL) 325 MG tablet Take 650 mg by mouth every 6 hours as needed for mild pain        amLODIPine (NORVASC) 2.5 MG tablet TAKE 1 TABLET BY MOUTH EVERY MORNING 30 tablet 10     ammonium lactate (AMLACTIN) 12 % external cream Apply topically daily as needed for dry skin       ASPIRIN ADULT LOW STRENGTH 81 MG EC tablet TAKE 1 TABLET BY MOUTH ONCE DAILY 30 tablet 11     bisacodyl (DULCOLAX) 10 MG suppository Place 10 mg rectally daily as needed for constipation       escitalopram (LEXAPRO) 10 MG tablet TAKE 1 TABLET BY MOUTH EVERY MORNING 30 tablet 11     estradiol (ESTRACE) 0.5 MG tablet TAKE ONE-HALF TABLET (0.25MG) BY MOUTH ONCE DAILY 30 tablet 11     levETIRAcetam (KEPPRA) 250 MG tablet TAKE 1 TABLET BY MOUTH ONCE DAILY 30 tablet 11     melatonin 3 MG tablet TAKE 1 TABLET BY MOUTH ONCE DAILY 30 tablet 11     NUTRITIONAL SUPPLEMENT LIQD Take by mouth 3 times daily        Nutritional Supplements (ENSURE PLUS) LIQD Take by mouth 2 times daily       polyethylene glycol (MIRALAX/GLYCOLAX)  "powder MIX 17GM OF POWDER IN 8OZ OF WATER UNTIL COMPLETELY DISSOLVED. DRINK SOLUTION BY MOUTH ONCE DAILY 510 g 11     predniSONE (DELTASONE) 10 MG tablet Take 10 mg by mouth daily       Sennosides (EQ NATURAL VEGETABLE LAXATIVE PO) Take 8.6 mg by mouth 2 times daily as needed        REVIEW OF SYSTEMS:  4 point ROS including Respiratory, CV, GI and , other than that noted in the HPI,  is negative    Objective:  BP (!) 139/91   Pulse 93   Temp 97.2  F (36.2  C)   Resp 17   Ht 1.626 m (5' 4\")   Wt 63.3 kg (139 lb 9.6 oz)   SpO2 96%   BMI 23.96 kg/m    Exam:  GENERAL APPEARANCE:  Alert, pleasant, cooperative  EYES: no discharge or mattering on lids or lashes noted  ENT:  moist mucous membranes, hearing acuity intact  RESP: no respiratory distress, Lung sounds clear, patient is on 2 room air  CV:  rate and rhythm regular, no murmur. Edema none in bilateral lower extremities.   ABDOMEN: normal bowel sounds, soft, nontender.  M/S:   Gait and station: ambulates independently with walker, no tenderness or swelling of the joints; able to move all extremities   SKIN:  warm and dry.   NEURO: no facial asymmetry, no speech deficits and able to follow directions, moves all extremities symmetrically  PSYCH:  insight, judgement, and memory impaired affect and mood normal    Labs:   No recent labs for review as goals of care are comfort     ASSESSMENT/PLAN:  (J44.9) Chronic obstructive pulmonary disease, unspecified COPD type (H)  (primary encounter diagnosis)  Comment: no wheezing or s/sx of exacerbation. Previously using oxygen but now on room air with saturations of 92% which is to be expected with COPD. Was on hospice but did not recertify and was discharged in November. Has since been doing well.   Plan:   --had duonebs q4h PRN but no longer on med list as she was started on prednisone which seems to be controlling symptoms. Will connect with daughter regarding reordering this so she has this for future use if needed. "   --oxygen PRN  --prednisone 10 mg daily. Would consider decreasing to 5 mg daily if daughter agrees.      (I10) Benign essential hypertension  Comment: controlled blood pressure with readings consistently in the 140-150s.   Plan: continue with amlodipine 2.5 mg daily   --Continue to monitor blood pressure and heart rate, adjust medications as needed.     (G30.1,  F02.80) Late onset Alzheimer's disease without behavioral disturbance (H)  Comment: chronic, stable. Able to do self cares and ambulates independently with walker.  mood is stable today.   Plan: continue with memory care assisted living for assistance with cares, meals and medications.   --continue with escitalopram 10 mg daily.      (R13.10) Dysphagia, unspecified type  Comment: stable. weights have increased back to admission weight of around 137 lb after a loss prior to hospice admission.   Plan: continue with pureed diet and thin liquids. Crush meds with applesauce.       Electronically signed by:  CYNDI Miramontes CNP

## 2020-03-27 ASSESSMENT — MIFFLIN-ST. JEOR: SCORE: 1044.62

## 2020-04-08 ENCOUNTER — VIRTUAL VISIT (OUTPATIENT)
Dept: GERIATRICS | Facility: CLINIC | Age: 85
End: 2020-04-08
Payer: MEDICARE

## 2020-04-08 VITALS
HEART RATE: 64 BPM | OXYGEN SATURATION: 92 % | DIASTOLIC BLOOD PRESSURE: 64 MMHG | TEMPERATURE: 96.7 F | WEIGHT: 135.5 LBS | SYSTOLIC BLOOD PRESSURE: 130 MMHG | BODY MASS INDEX: 23.13 KG/M2 | HEIGHT: 64 IN | RESPIRATION RATE: 18 BRPM

## 2020-04-08 DIAGNOSIS — G30.1 LATE ONSET ALZHEIMER'S DISEASE WITHOUT BEHAVIORAL DISTURBANCE (H): ICD-10-CM

## 2020-04-08 DIAGNOSIS — F32.4 MAJOR DEPRESSIVE DISORDER IN PARTIAL REMISSION, UNSPECIFIED WHETHER RECURRENT (H): ICD-10-CM

## 2020-04-08 DIAGNOSIS — R13.10 DYSPHAGIA, UNSPECIFIED TYPE: ICD-10-CM

## 2020-04-08 DIAGNOSIS — F02.80 LATE ONSET ALZHEIMER'S DISEASE WITHOUT BEHAVIORAL DISTURBANCE (H): ICD-10-CM

## 2020-04-08 DIAGNOSIS — J44.9 CHRONIC OBSTRUCTIVE PULMONARY DISEASE, UNSPECIFIED COPD TYPE (H): Primary | ICD-10-CM

## 2020-04-08 DIAGNOSIS — I10 BENIGN ESSENTIAL HYPERTENSION: ICD-10-CM

## 2020-04-08 NOTE — PROGRESS NOTES
"[facility]   Zoraida Olivarez is being evaluated via a billable video visit due to the restrictions of the Covid-19 pandemic.   The patient has been notified of following:\"This video visit will be conducted via a call between you and your provider. We have found that certain health care needs can be provided without the need for an in-person physical exam.  This service lets us provide the care you need with a video conversation. If during the course of the call the provider feels a video visit is not appropriate, you will not be charged for this service.\"   The provider has received verbal consent for a Video Visit from the patient or first contact? Yes  Video Start Time: 3:08  [place] Medical Record Number:  [id number]  Place of Location at the time of visit: [facility]   Chief Complaint   Patient presents with     RECHECK     HPI:  Zoraida Olivarez  is a 85 year old (4/11/1934), who is being seen today for a visit.  HPI information obtained from: facility chart records, facility staff, patient report, [facility] chart review and [facility] chart review.     Ms. Olivarez was visited virtually today with a facility nurse.  Dylan states that she has had a cough that comes and go throughout the day. Doesn't feel short of breath.  She does not note an increase in coughing with eating or drinking. She eats the foods that she enjoys but if is something she does not like, \"they can give it to someone else.\" she is currently on a mechanical soft diet after having difficulty with a regular diet. She has been sleeping well at night.      /56, R16, P66, O2 sat 97%. Per nursing, lungs sounds clear on last assessment which was completed on 4/1/20.    Past Medical and Surgical History reviewed in Epic today.  MEDICATIONS:    Current Outpatient Medications   Medication Sig Dispense Refill     acetaminophen (TYLENOL) 325 MG tablet Take 650 mg " "by mouth every 6 hours as needed for mild pain        amLODIPine (NORVASC) 2.5 MG tablet TAKE 1 TABLET BY MOUTH EVERY MORNING 30 tablet 10     ammonium lactate (AMLACTIN) 12 % external cream Apply topically daily as needed for dry skin       ASPIRIN ADULT LOW STRENGTH 81 MG EC tablet TAKE 1 TABLET BY MOUTH ONCE DAILY 30 tablet 11     bisacodyl (DULCOLAX) 10 MG suppository Place 10 mg rectally daily as needed for constipation       escitalopram (LEXAPRO) 10 MG tablet TAKE 1 TABLET BY MOUTH EVERY MORNING 28 tablet PRN     estradiol (ESTRACE) 0.5 MG tablet TAKE ONE-HALF TABLET (0.25MG) BY MOUTH ONCE DAILY 30 tablet 11     levETIRAcetam (KEPPRA) 250 MG tablet TAKE 1 TABLET BY MOUTH ONCE DAILY 30 tablet PRN     melatonin 3 MG tablet TAKE 1 TABLET BY MOUTH ONCE DAILY 30 tablet PRN     NUTRITIONAL SUPPLEMENT LIQD Take by mouth 3 times daily        Nutritional Supplements (ENSURE PLUS) LIQD Take by mouth 2 times daily       polyethylene glycol (MIRALAX/GLYCOLAX) powder MIX 17GM OF POWDER IN 8OZ OF WATER UNTIL COMPLETELY DISSOLVED. DRINK SOLUTION BY MOUTH ONCE DAILY 510 g 11     predniSONE (DELTASONE) 10 MG tablet Take 10 mg by mouth daily       ROBAFEN 100 MG/5ML SYRP TAKE 30ML (600MG) BY MOUTH EVERY 12 HOURS 473 mL 97     Sennosides (EQ NATURAL VEGETABLE LAXATIVE PO) Take 8.6 mg by mouth 2 times daily as needed        REVIEW OF SYSTEMS: Limited secondary to cognitive impairment but today pt reports no shortness of breath   Objective: /64   Pulse 64   Temp 96.7  F (35.9  C)   Resp 18   Ht 1.626 m (5' 4\")   Wt 61.5 kg (135 lb 8 oz)   SpO2 92%   BMI 23.26 kg/m    Limited visit exam done given COVID-19 precautions.   GENERAL APPEARANCE:  Alert, in no distress, pleasant, cooperative  RESP: no respiratory distress, patient is on room air  CV: . Edema 1+ in bilateral lower extremities.  M/S:   Gait and station: ambulates with walker, able to move all extremities   SKIN:  Inspection and palpation of skin and " subcutaneous tissue: skin warm, dry and intact without rashes but exam is limited  NEURO: no facial asymmetry, no speech deficits and able to follow directions, moves all extremities symmetrically  PSYCH:  insight, judgement, and memory impaired, affect and mood normal      ASSESSMENT/PLAN:  (J44.9) Chronic obstructive pulmonary disease, unspecified COPD type (H)  (primary encounter diagnosis)  Comment: no wheezing or s/sx of exacerbation. Previously using oxygen but now on room air with saturations of 92% which is to be expected with COPD. Was on hospice in the past but did not recertifyy and was discharged in November. Has since been doing well although has a cough at times.   Plan:   --robafen q12h PRN  --oxygen PRN  --prednisone 10 mg daily.      (I10) Benign essential hypertension  Comment: controlled blood pressure with readings consistently in the 120-150s.   Plan: continue with amlodipine 2.5 mg daily   --Continue to monitor blood pressure and heart rate, adjust medications as needed.     (G30.1,  F02.80) Late onset Alzheimer's disease without behavioral disturbance (H)  Comment: chronic, stable. Able to do self cares and ambulates independently with walker.  [facility] to [facility] in [facility] during current pandemic.    Plan: continue with memory care assisted living for assistance with cares, meals and medications.   --continue with escitalopram 10 mg daily.      (R13.10) Dysphagia, unspecified type  Comment: stable weights consistently around 135 lbs. She requested to change diet to regular but had difficulty swallowing this so it was changed back to mechanical soft which she appears to be tolerating well.   Plan: continue with mechanical soft diet and thin liquids. Crush meds with applesauce.     Electronically signed by:  CYNDI Miramontes CNP     Video-Visit Details  Type of service:  Video Visit  Video End Time (time video stopped): 3:17  Distant Location (provider  location):  [facility]

## 2020-05-08 DIAGNOSIS — I10 BENIGN ESSENTIAL HYPERTENSION: ICD-10-CM

## 2020-05-09 RX ORDER — AMLODIPINE BESYLATE 2.5 MG/1
TABLET ORAL
Qty: 28 TABLET | Refills: 97 | Status: SHIPPED | OUTPATIENT
Start: 2020-05-09 | End: 2021-08-03

## 2020-07-03 DIAGNOSIS — J44.9 CHRONIC OBSTRUCTIVE PULMONARY DISEASE, UNSPECIFIED COPD TYPE (H): Primary | ICD-10-CM

## 2020-07-03 RX ORDER — PREDNISONE 10 MG/1
TABLET ORAL
Qty: 28 TABLET | Refills: 97 | Status: SHIPPED | OUTPATIENT
Start: 2020-07-03 | End: 2021-02-03

## 2020-07-09 ENCOUNTER — RECORDS - HEALTHEAST (OUTPATIENT)
Dept: LAB | Facility: CLINIC | Age: 85
End: 2020-07-09

## 2020-07-10 ENCOUNTER — ASSISTED LIVING VISIT (OUTPATIENT)
Dept: GERIATRICS | Facility: CLINIC | Age: 85
End: 2020-07-10
Payer: MEDICARE

## 2020-07-10 VITALS — TEMPERATURE: 98 F | OXYGEN SATURATION: 93 %

## 2020-07-10 DIAGNOSIS — G30.1 LATE ONSET ALZHEIMER'S DISEASE WITHOUT BEHAVIORAL DISTURBANCE (H): ICD-10-CM

## 2020-07-10 DIAGNOSIS — F02.80 LATE ONSET ALZHEIMER'S DISEASE WITHOUT BEHAVIORAL DISTURBANCE (H): ICD-10-CM

## 2020-07-10 DIAGNOSIS — I48.0 PAF (PAROXYSMAL ATRIAL FIBRILLATION) (H): ICD-10-CM

## 2020-07-10 DIAGNOSIS — I73.9 PVD (PERIPHERAL VASCULAR DISEASE) (H): ICD-10-CM

## 2020-07-10 DIAGNOSIS — I10 BENIGN ESSENTIAL HYPERTENSION: Primary | ICD-10-CM

## 2020-07-10 DIAGNOSIS — J44.9 CHRONIC OBSTRUCTIVE PULMONARY DISEASE, UNSPECIFIED COPD TYPE (H): ICD-10-CM

## 2020-07-10 DIAGNOSIS — R13.10 DYSPHAGIA, UNSPECIFIED TYPE: ICD-10-CM

## 2020-07-10 NOTE — LETTER
"    7/10/2020        RE: Zoraida Olivarez  [facility]  [address] [facility] [address]        Zoraida Olivarez is a 86 year old female seen July 10, 2020 at [facility] unit where she has resided for one year (admit 2/2019) seen to follow up COPD with hypoxia and dementia.     Patient is seen in her room still abed late morning.   States she had breakfast and went back to bed, not dressed.   States her breathing is okay, no longer on O2 supplementation.   General fatigue but no specific pain.   Limited history, and very flat affect         By chart review, patient was found down at home in February 2019 and hospitalized at [facility] for E coli UTI with sepsis, atrial fib with RVR and COPD exacerbation  She was treated with IVF and abx; VR control with metoprolol, but not anticoagulated given h/o brain aneurysm, falls and dementia.  She went to [facility] T[facility], but did not improve enough to return to independent living, transferred to AL for permanent placement.    Pt was on Hospice for a while, but \"graduated\" in November 2019 secondary to stability.     Patient has followed with Dr Hightower, Neurology for decades after being diagnosed with a cerebral aneurysm in 1980.  She had a surgical repair and is on Keppra for sz prophylaxis.    No current headaches or neurologic changes.       Past Medical History:   Diagnosis Date     Cerebral aneurysm, 1980      Chronic obstructive lung disease (H), 2003      Hearing loss, sensorineural, combined types, 2009      History of shingles      Hyperlipidemia      Ileitis      Incomplete RBBB      Major depression      Memory loss      PAD (peripheral artery disease) (H), s/p bilateral iliac stents 2007      Polyp of colon, adenomatous, 2008      Skin cancer of arm      Tobacco abuse      Tremor, essential, 2004      Past Surgical History:   Procedure Laterality Date     APPENDECTOMY       CHOLECYSTECTOMY       " HYSTERECTOMY       SH:  Lives in [facility] Memory Care unit.   Previously lived alone in her condo.      She has a daughter Nava and a son Amos.   Pt reports she grew up in [place].  Worked as a  at one point.      +smoker until admission; smoked for >60 years    ROS:  SLUMS 1/30   ACL 3.8 in TCU  Able to stand independently, ambulatory without device  Has dysphagia, on mechanical soft diet  PVD had LE edema and venous stasis ulcers, now healed.       EXAM: limited by COVID19 precautions  Temp 98  F (36.7  C)   SpO2 93%    NAD  Ext without edema  Healing skin tear on left arm  Neuro: limited history, bilateral hand intention tremor     Psych: a little guarded, affect flat      Labs reviewed    IMP/PLAN:   (J44.9) Chronic obstructive pulmonary disease, unspecified COPD type (H)   Comment: past smoker but now >a year since she last smoked      No longer on O2, BDs or muscarinic agents.  Plan:   Remains on prednisone 10 mg/day; not sure if this is the lowest effective dose.       (I10) Benign essential hypertension   Comment:   BP Readings from Last 3 Encounters:   03/27/20 130/64   02/07/20 (!) 139/91   11/25/19 128/89      Plan: amlodipine 2.5 mg/day, follow     (I48.0) PAF (paroxysmal atrial fibrillation) (H)  Comment:    Pulse Readings from Last 4 Encounters:   03/27/20 64   02/07/20 93   11/25/19 69   06/09/19 66      Plan: controlled VR without rate slowing medications; on ASA only as above       (I73.9) PVD (peripheral vascular disease) (H)  Comment: h/o LE ulcers    Plan: continue daily ASA, statin, monitor for open areas     (Z86.79) Hx of aneurysm  Comment: s/p repair   Plan: continue Keppra 250 mg/day for stroke prophylaxis  Follow up with Dr Hightower      (R13.10) Dysphagia, unspecified type  Comment: weight loss has stabilized   Plan: mechanical soft diet with thin liquids       (G30.1,  F02.80) Late onset Alzheimer's disease without behavioral disturbance  Comment: low functional status    Plan: AL  Memory Care unit for assist with meds, meals, activity       (F43.21) Adjustment disorder with depressed mood  Comment: loss of autonomy, limited coping skills   Plan: continue escitalopram and melatonin for sleep        Meghan Robles MD       Sincerely,        Meghan Robles MD

## 2020-07-15 NOTE — PROGRESS NOTES
"Zoraida Olivarez is a 86 year old female seen July 10, 2020 at [facility] Memory Care unit where she has resided for one year (admit 2/2019) seen to follow up COPD with hypoxia and dementia.     Patient is seen in her room still abed late morning.   States she had breakfast and went back to bed, not dressed.   States her breathing is okay, no longer on O2 supplementation.   General fatigue but no specific pain.   Limited history, and very flat affect         By chart review, patient was found down at home in February 2019 and hospitalized at [facility] for E coli UTI with sepsis, atrial fib with RVR and COPD exacerbation  She was treated with IVF and abx; VR control with metoprolol, but not anticoagulated given h/o brain aneurysm, falls and dementia.  She went to [facility] TCU, but did not improve enough to return to independent living, transferred to AL for permanent placement.    Pt was on Hospice for a while, but \"graduated\" in November 2019 secondary to stability.     Patient has followed with Dr Hightower, Neurology for decades after being diagnosed with a cerebral aneurysm in 1980.  She had a surgical repair and is on Keppra for sz prophylaxis.    No current headaches or neurologic changes.       Past Medical History:   Diagnosis Date     Cerebral aneurysm, 1980      Chronic obstructive lung disease (H), 2003      Hearing loss, sensorineural, combined types, 2009      History of shingles      Hyperlipidemia      Ileitis      Incomplete RBBB      Major depression      Memory loss      PAD (peripheral artery disease) (H), s/p bilateral iliac stents 2007      Polyp of colon, adenomatous, 2008      Skin cancer of arm      Tobacco abuse      Tremor, essential, 2004      Past Surgical History:   Procedure Laterality Date     APPENDECTOMY       CHOLECYSTECTOMY       HYSTERECTOMY       SH:  Lives in AL Memory Care unit.   Previously lived alone in her condo.      She has a daughter " Nava and a son Amos.   Pt reports she grew up in [place].  Worked as a  at one point.      +smoker until admission; smoked for >60 years    ROS:  SLUMS 1/30   ACL 3.8 in TCU  Able to stand independently, ambulatory without device  Has dysphagia, on mechanical soft diet  PVD had LE edema and venous stasis ulcers, now healed.       EXAM: limited by COVID19 precautions  Temp 98  F (36.7  C)   SpO2 93%    NAD  Ext without edema  Healing skin tear on left arm  Neuro: limited history, bilateral hand intention tremor     Psych: a little guarded, affect flat      Labs reviewed    IMP/PLAN:   (J44.9) Chronic obstructive pulmonary disease, unspecified COPD type (H)   Comment: past smoker but now >a year since she last smoked      No longer on O2, BDs or muscarinic agents.  Plan:   Remains on prednisone 10 mg/day; not sure if this is the lowest effective dose.       (I10) Benign essential hypertension   Comment:   BP Readings from Last 3 Encounters:   03/27/20 130/64   02/07/20 (!) 139/91   11/25/19 128/89      Plan: amlodipine 2.5 mg/day, follow     (I48.0) PAF (paroxysmal atrial fibrillation) (H)  Comment:    Pulse Readings from Last 4 Encounters:   03/27/20 64   02/07/20 93   11/25/19 69   06/09/19 66      Plan: controlled VR without rate slowing medications; on ASA only as above       (I73.9) PVD (peripheral vascular disease) (H)  Comment: h/o LE ulcers    Plan: continue daily ASA, statin, monitor for open areas     (Z86.79) Hx of aneurysm  Comment: s/p repair   Plan: continue Keppra 250 mg/day for stroke prophylaxis  Follow up with Dr Hightower      (R13.10) Dysphagia, unspecified type  Comment: weight loss has stabilized   Plan: mechanical soft diet with thin liquids       (G30.1,  F02.80) Late onset Alzheimer's disease without behavioral disturbance  Comment: low functional status    Plan: [facility] Memory Care unit for assist with meds, meals, activity       (F43.21) Adjustment disorder with depressed mood  Comment:  loss of autonomy, limited coping skills   Plan: continue escitalopram and melatonin for sleep        Meghan Robles MD

## 2020-07-17 LAB
SARS-COV-2 BY NAA - HISTORICAL: NOT DETECTED
SARS-COV-2 SOURCE - HISTORICAL: NORMAL

## 2020-07-20 ENCOUNTER — RECORDS - HEALTHEAST (OUTPATIENT)
Dept: LAB | Facility: CLINIC | Age: 85
End: 2020-07-20

## 2020-07-25 LAB
SARS-COV-2 BY NAA - HISTORICAL: NOT DETECTED
SARS-COV-2 SOURCE - HISTORICAL: NORMAL

## 2020-08-12 DIAGNOSIS — K59.01 SLOW TRANSIT CONSTIPATION: ICD-10-CM

## 2020-08-13 RX ORDER — POLYETHYLENE GLYCOL 3350 17 G/17G
POWDER, FOR SOLUTION ORAL
Qty: 510 G | Refills: 0 | Status: SHIPPED | OUTPATIENT
Start: 2020-08-13 | End: 2020-11-25

## 2020-09-04 ASSESSMENT — MIFFLIN-ST. JEOR: SCORE: 1046.68

## 2020-09-10 ENCOUNTER — ASSISTED LIVING VISIT (OUTPATIENT)
Dept: GERIATRICS | Facility: CLINIC | Age: 85
End: 2020-09-10
Payer: MEDICARE

## 2020-09-10 VITALS
BODY MASS INDEX: 23.39 KG/M2 | HEART RATE: 79 BPM | HEIGHT: 64 IN | SYSTOLIC BLOOD PRESSURE: 134 MMHG | DIASTOLIC BLOOD PRESSURE: 80 MMHG | OXYGEN SATURATION: 95 % | RESPIRATION RATE: 20 BRPM | TEMPERATURE: 97.2 F | WEIGHT: 137 LBS

## 2020-09-10 DIAGNOSIS — G30.1 LATE ONSET ALZHEIMER'S DISEASE WITHOUT BEHAVIORAL DISTURBANCE (H): ICD-10-CM

## 2020-09-10 DIAGNOSIS — R13.10 DYSPHAGIA, UNSPECIFIED TYPE: ICD-10-CM

## 2020-09-10 DIAGNOSIS — I10 BENIGN ESSENTIAL HYPERTENSION: ICD-10-CM

## 2020-09-10 DIAGNOSIS — J44.9 CHRONIC OBSTRUCTIVE PULMONARY DISEASE, UNSPECIFIED COPD TYPE (H): ICD-10-CM

## 2020-09-10 DIAGNOSIS — F02.80 LATE ONSET ALZHEIMER'S DISEASE WITHOUT BEHAVIORAL DISTURBANCE (H): ICD-10-CM

## 2020-09-10 NOTE — LETTER
9/10/2020        RE: Zoraida Olivarez  [facility]  [address]  [facility] [address]        [place] [facility]  [place] Medical Record Number: [id number]  Place of Service where encounter took place: [facility] ([facility]) [[id number]]  Chief Complaint   Patient presents with     RECHECK       HPI:    Zoraida Olivarez is a 86 year old (4/11/1934), who is being seen today for an episodic care visit. HPI information obtained from: facility chart records, facility staff and patient report.     Ms. Olivarez was visited today while in her room doing a puzzle. She has liquid in her mouth so she does not talk through the visit. Asked if she continues to have difficulty swallowing and she nods her head no. Has been eating well without coughing. Sleeping well. No shortness of breath.     Staff report that Dylan has been doing very well and have no concerns at this time.     Past Medical and Surgical History reviewed in Epic today.    MEDICATIONS:  Current Outpatient Medications   Medication Sig Dispense Refill     guaiFENesin (ROBAFEN) 100 MG/5ML SYRP Take 10 mLs by mouth 2 times daily as needed for cough 473 mL 97     acetaminophen (TYLENOL) 325 MG tablet TAKE TWO TABLETS (650MG) BY MOUTH EVERY 6 HOURS AS NEEDED FOR PAIN RATED 1-4 60 tablet 97     amLODIPine (NORVASC) 2.5 MG tablet TAKE 1 TABLET BY MOUTH EVERY MORNING 28 tablet 97     ammonium lactate (AMLACTIN) 12 % external cream Apply topically daily as needed for dry skin       ASPIRIN ADULT LOW STRENGTH 81 MG EC tablet TAKE 1 TABLET BY MOUTH ONCE DAILY 30 tablet 11     bisacodyl (DULCOLAX) 10 MG suppository Place 10 mg rectally daily as needed for constipation       escitalopram (LEXAPRO) 10 MG tablet TAKE 1 TABLET BY MOUTH EVERY MORNING 28 tablet PRN     estradiol (ESTRACE) 0.5 MG tablet TAKE ONE-HALF TABLET (0.25MG) BY MOUTH ONCE DAILY 14 tablet 97     levETIRAcetam (KEPPRA) 250 MG tablet TAKE 1  "TABLET BY MOUTH ONCE DAILY 30 tablet PRN     melatonin 3 MG tablet TAKE 1 TABLET BY MOUTH ONCE DAILY 30 tablet PRN     Nutritional Supplements (ENSURE PLUS) LIQD Take by mouth 2 times daily       polyethylene glycol (MIRALAX) 17 GM/Dose powder MIX 17GM OF POWDER IN 8OZ OF WATER UNTIL COMPLETELY DISSOLVED. DRINK SOLUTION ONCE DAILY 510 g 0     predniSONE (DELTASONE) 10 MG tablet TAKE 1 TABLET BY MOUTH EVERY MORNING WITH FOOD 28 tablet 97     Sennosides (EQ NATURAL VEGETABLE LAXATIVE PO) Take 8.6 mg by mouth 2 times daily as needed        REVIEW OF SYSTEMS:  Limited secondary to cognitive impairment but today pt reports as noted above.     Objective:  /80   Pulse 79   Temp 97.2  F (36.2  C)   Resp 20   Ht 1.626 m (5' 4.02\")   Wt 62.1 kg (137 lb)   SpO2 95%   BMI 23.50 kg/m    Exam:  GENERAL APPEARANCE:  Alert, in no distress, pleasant, cooperative  RESP: no respiratory distress, patient is on room air  CV: . Edema none in bilateral lower extremities.  M/S:   Gait and station: ambulates with walker, able to move all extremities   SKIN:  Inspection and palpation of skin and subcutaneous tissue: skin warm, dry and intact without rashes but exam is limited  NEURO: no facial asymmetry, no speech deficits and able to follow directions, moves all extremities symmetrically  PSYCH:  insight, judgement, and memory impaired, affect and mood normal      Labs:   Reviewed. No longer taking labs due to goals of care.     ASSESSMENT/PLAN:  (J44.9) Chronic obstructive pulmonary disease, unspecified COPD type (H)  (primary encounter diagnosis)  Comment: no wheezing or s/sx of exacerbation. Previously using oxygen but now on room air with saturations of 93-97%. Was on hospice in the past but did not recertifyy and was discharged in November 2019.   Plan:   --robafen q12h PRN  --oxygen PRN  --prednisone 10 mg daily.      (I10) Benign essential hypertension  Comment: controlled blood pressure with readings consistently in the " 120-130s range  Plan:  --would like to discontinue amlodipine 2.5 mg daily if okay with family-[place] family [place] the bridge.   --Continue to monitor blood pressure and heart rate, adjust medications as needed.     (G30.1,  F02.80) Late onset Alzheimer's disease without behavioral disturbance (H)  Comment: chronic, stable. Able to do self cares and ambulates independently with walker. has been doing well per nursing.   Plan: continue with memory care assisted living for assistance with cares, meals and medications.   --continue with escitalopram 10 mg daily.      (R13.10) Dysphagia, unspecified type  Comment: stable weights consistently around 135 lbs. She appears to be tolerating diet well.   Plan: continue with mechanical soft diet and thin liquids. Crush meds with applesauce.     Electronically signed by:  CYNDI Miramontes CNP       Sincerely,        CYNDI Miramontes CNP

## 2020-09-10 NOTE — PROGRESS NOTES
[place] [facility]  [place] Medical Record Number: [id number]  Place of Service where encounter took place: [facility] ([facility]) [[id number]]  Chief Complaint   Patient presents with     RECHECK       HPI:    Zoraida Olivarez is a 86 year old (4/11/1934), who is being seen today for an episodic care visit. HPI information obtained from: facility chart records, facility staff and patient report.     Ms. Olivarez was visited today while in her room doing a puzzle. She has liquid in her mouth so she does not talk through the visit. Asked if she continues to have difficulty swallowing and she nods her head no. Has been eating well without coughing. Sleeping well. No shortness of breath.     Staff report that Dylan has been doing very well and have no concerns at this time.     Past Medical and Surgical History reviewed in Epic today.    MEDICATIONS:  Current Outpatient Medications   Medication Sig Dispense Refill     guaiFENesin (ROBAFEN) 100 MG/5ML SYRP Take 10 mLs by mouth 2 times daily as needed for cough 473 mL 97     acetaminophen (TYLENOL) 325 MG tablet TAKE TWO TABLETS (650MG) BY MOUTH EVERY 6 HOURS AS NEEDED FOR PAIN RATED 1-4 60 tablet 97     amLODIPine (NORVASC) 2.5 MG tablet TAKE 1 TABLET BY MOUTH EVERY MORNING 28 tablet 97     ammonium lactate (AMLACTIN) 12 % external cream Apply topically daily as needed for dry skin       ASPIRIN ADULT LOW STRENGTH 81 MG EC tablet TAKE 1 TABLET BY MOUTH ONCE DAILY 30 tablet 11     bisacodyl (DULCOLAX) 10 MG suppository Place 10 mg rectally daily as needed for constipation       escitalopram (LEXAPRO) 10 MG tablet TAKE 1 TABLET BY MOUTH EVERY MORNING 28 tablet PRN     estradiol (ESTRACE) 0.5 MG tablet TAKE ONE-HALF TABLET (0.25MG) BY MOUTH ONCE DAILY 14 tablet 97     levETIRAcetam (KEPPRA) 250 MG tablet TAKE 1 TABLET BY MOUTH ONCE DAILY 30 tablet PRN     melatonin 3 MG tablet TAKE 1 TABLET BY MOUTH ONCE DAILY 30 tablet PRN  "    Nutritional Supplements (ENSURE PLUS) LIQD Take by mouth 2 times daily       polyethylene glycol (MIRALAX) 17 GM/Dose powder MIX 17GM OF POWDER IN 8OZ OF WATER UNTIL COMPLETELY DISSOLVED. DRINK SOLUTION ONCE DAILY 510 g 0     predniSONE (DELTASONE) 10 MG tablet TAKE 1 TABLET BY MOUTH EVERY MORNING WITH FOOD 28 tablet 97     Sennosides (EQ NATURAL VEGETABLE LAXATIVE PO) Take 8.6 mg by mouth 2 times daily as needed        REVIEW OF SYSTEMS:  Limited secondary to cognitive impairment but today pt reports as noted above.     Objective:  /80   Pulse 79   Temp 97.2  F (36.2  C)   Resp 20   Ht 1.626 m (5' 4.02\")   Wt 62.1 kg (137 lb)   SpO2 95%   BMI 23.50 kg/m    Exam:  GENERAL APPEARANCE:  Alert, in no distress, pleasant, cooperative  RESP: no respiratory distress, patient is on room air  CV: . Edema none in bilateral lower extremities.  M/S:   Gait and station: ambulates with walker, able to move all extremities   SKIN:  Inspection and palpation of skin and subcutaneous tissue: skin warm, dry and intact without rashes but exam is limited  NEURO: no facial asymmetry, no speech deficits and able to follow directions, moves all extremities symmetrically  PSYCH:  insight, judgement, and memory impaired, affect and mood normal      Labs:   Reviewed. No longer taking labs due to goals of care.     ASSESSMENT/PLAN:  (J44.9) Chronic obstructive pulmonary disease, unspecified COPD type (H)  (primary encounter diagnosis)  Comment: no wheezing or s/sx of exacerbation. Previously using oxygen but now on room air with saturations of 93-97%. Was on hospice in the past but did not recertifyy and was discharged in November 2019.   Plan:   --robafen q12h PRN  --oxygen PRN  --prednisone 10 mg daily.      (I10) Benign essential hypertension  Comment: controlled blood pressure with readings consistently in the 120-130s range  Plan:  --would like to discontinue amlodipine 2.5 mg daily if okay with family-left a message for " family on the bridge.   --Continue to monitor blood pressure and heart rate, adjust medications as needed.     (G30.1,  F02.80) Late onset Alzheimer's disease without behavioral disturbance (H)  Comment: chronic, stable. Able to do self cares and ambulates independently with walker. has been doing well per nursing.   Plan: continue with memory care assisted living for assistance with cares, meals and medications.   --continue with escitalopram 10 mg daily.      (R13.10) Dysphagia, unspecified type  Comment: stable weights consistently around 135 lbs. She appears to be tolerating diet well.   Plan: continue with mechanical soft diet and thin liquids. Crush meds with applesauce.     Electronically signed by:  CYNDI Miramontes CNP

## 2020-10-25 DIAGNOSIS — I10 BENIGN ESSENTIAL HYPERTENSION: Primary | ICD-10-CM

## 2020-10-26 RX ORDER — ASPIRIN 81 MG
TABLET,CHEWABLE ORAL
Qty: 28 TABLET | Refills: 97 | Status: SHIPPED | OUTPATIENT
Start: 2020-10-26 | End: 2022-01-01

## 2020-11-25 DIAGNOSIS — K59.01 SLOW TRANSIT CONSTIPATION: ICD-10-CM

## 2020-11-25 RX ORDER — POLYETHYLENE GLYCOL 3350 17 G/17G
POWDER, FOR SOLUTION ORAL
Qty: 510 G | Refills: 97 | Status: SHIPPED | OUTPATIENT
Start: 2020-11-25 | End: 2023-01-24

## 2020-12-11 NOTE — PROGRESS NOTES
[place] [facility]  [place] Medical Record Number: [id number]  Place of Service where encounter took place: [facility] ([facility]) [[id number]]  Chief Complaint   Patient presents with     RECHECK       HPI:    Zoraida Olivarez is a 86 year old (4/11/1934), who is being seen today for an episodic care visit. HPI information obtained from: facility chart records, facility staff, patient report and [facility] chart review.     Ms. Olivarez was visited today while in her room. She was resting on the couch, says that she has shoulder pain but otherwise felt comfortable. She feels that her breathing is comfortable. I saw her breakfast plate and it looks like she ate about 75% this morning.     Nursing has noted that she has been more tired lately with decreased oral intake. She has declined the weekly COVID surveillance swabs but there are others in her memory care that are positive. No fevers or hypoxia have been reported.     Past Medical and Surgical History reviewed in Epic today.    MEDICATIONS:  Current Outpatient Medications   Medication Sig Dispense Refill     sulfamethoxazole-trimethoprim (BACTRIM DS) 800-160 MG tablet Take 1 tablet by mouth 2 times daily 6 tablet 0     acetaminophen (TYLENOL) 325 MG tablet TAKE TWO TABLETS (650MG) BY MOUTH EVERY 6 HOURS AS NEEDED FOR PAIN RATED 1-4 60 tablet 97     amLODIPine (NORVASC) 2.5 MG tablet TAKE 1 TABLET BY MOUTH EVERY MORNING 28 tablet 97     ammonium lactate (AMLACTIN) 12 % external cream Apply topically daily as needed for dry skin       ASPIRIN LOW DOSE 81 MG chewable tablet CHEW AND SWALLOW ONE TABLET BY MOUTH ONCE DAILY 28 tablet 97     bisacodyl (DULCOLAX) 10 MG suppository Place 10 mg rectally daily as needed for constipation       escitalopram (LEXAPRO) 10 MG tablet TAKE 1 TABLET BY MOUTH EVERY MORNING 28 tablet PRN     estradiol (ESTRACE) 0.5 MG tablet TAKE ONE-HALF TABLET (0.25MG) BY MOUTH ONCE DAILY 14  "tablet 97     guaiFENesin (ROBAFEN) 100 MG/5ML SYRP Take 10 mLs by mouth 2 times daily as needed for cough 473 mL 97     levETIRAcetam (KEPPRA) 250 MG tablet TAKE 1 TABLET BY MOUTH ONCE DAILY 30 tablet PRN     melatonin 3 MG tablet TAKE 1 TABLET BY MOUTH ONCE DAILY 30 tablet PRN     Nutritional Supplements (ENSURE PLUS) LIQD Take by mouth 2 times daily       polyethylene glycol (MIRALAX) 17 GM/Dose powder MIX 17GM OF POWDER IN 8OZ OF WATER UNTIL COMPLETELY DISSOLVED. DRINK SOLUTION BY MOUTH ONCE DAILY 510 g 97     predniSONE (DELTASONE) 10 MG tablet TAKE 1 TABLET BY MOUTH EVERY MORNING WITH FOOD 28 tablet 97     Sennosides (EQ NATURAL VEGETABLE LAXATIVE PO) Take 8.6 mg by mouth 2 times daily as needed        REVIEW OF SYSTEMS:  Limited secondary to cognitive impairment    Objective:  /64   Pulse 64   Temp 98.8  F (37.1  C)   Resp 18   Ht 1.626 m (5' 4\")   Wt 61.2 kg (135 lb)   SpO2 98%   BMI 23.17 kg/m    Exam:  GENERAL APPEARANCE:  Alert, in no distress, pleasant, cooperative, resting on couch  EYES: no discharge or mattering on lids or lashes noted  ENT:  moist mucous membranes, hearing acuity intact  NECK: supple, symmetrical  RESP: no respiratory distress, Lung sounds clear, patient is on room air  CV:  rate and rhythm regular. Edema none in bilateral lower extremities. VASCULAR: warm extremities without open areas.  ABDOMEN: normal bowel sounds, soft, nontender.  M/S:   Gait and station: ambulates independently with walker, no tenderness or swelling of the joints; able to move all extremities   SKIN:  Inspection and palpation of skin and subcutaneous tissue: skin warm, dry and intact without rashes  NEURO: no facial asymmetry, no speech deficits and able to follow directions, moves all extremities symmetrically  PSYCH:  insight, judgement, and memory impaired affect and mood normal    Labs:   reviewed    ASSESSMENT/PLAN:  Malaise  COPD  Has been sleeping more lately and decreased oral intake but " appears that she ate well for breakfast today. Nursing today reported that she came out of room yesterday and seems at baseline. COVID is a possibility but she has not allowed for testing, temp has been 97s with oxygen saturations of 90-99%, she does have underlying COPD so sats in the low 90s are not abnormal for her. There is no s/sx of COPD exacerbation today. Also wonder about a UTI which she has had in the past and there is a strong urine odor in her room. Staff has attempted to collect a urine specimen but Dylan has removed the collection hat when she uses the restroom. Will treat with a 3 day course in the event there is an underlying infection. Updated daughter, Nava via bridge and she agrees with the plan.  --start bactrim DS 1 tablet PO q12h x 3 days  --continue with prednisone 10 mg daily for COPD  --continue with daily temp/O2 checks.     Electronically signed by:  CYNDI Miramontes CNP

## 2020-12-15 VITALS
DIASTOLIC BLOOD PRESSURE: 64 MMHG | SYSTOLIC BLOOD PRESSURE: 130 MMHG | BODY MASS INDEX: 23.05 KG/M2 | HEART RATE: 64 BPM | RESPIRATION RATE: 18 BRPM | HEIGHT: 64 IN | OXYGEN SATURATION: 98 % | TEMPERATURE: 98.8 F | WEIGHT: 135 LBS

## 2020-12-15 ASSESSMENT — MIFFLIN-ST. JEOR: SCORE: 1037.36

## 2020-12-16 ENCOUNTER — ASSISTED LIVING VISIT (OUTPATIENT)
Dept: GERIATRICS | Facility: CLINIC | Age: 85
End: 2020-12-16
Payer: MEDICARE

## 2020-12-16 DIAGNOSIS — R53.81 MALAISE: Primary | ICD-10-CM

## 2020-12-16 DIAGNOSIS — J44.9 CHRONIC OBSTRUCTIVE PULMONARY DISEASE, UNSPECIFIED COPD TYPE (H): ICD-10-CM

## 2020-12-16 NOTE — LETTER
12/16/2020        RE: Zoraida Olivarez  C/o Nava Bruce  [address]  [facility] [address]        [place] [facility]  [place] Medical Record Number: [id number]  Place of Service where encounter took place: [facility] [place] ([facility]) [[id number]]  Chief Complaint   Patient presents with     RECHECK       HPI:    Zoraida Olivarez is a 86 year old (4/11/1934), who is being seen today for an episodic care visit. HPI information obtained from: facility chart records, facility staff, patient report and [facility] chart review.     Ms. Olivarez was visited today while in her room. She was resting on the couch, says that she has shoulder pain but otherwise felt comfortable. She feels that her breathing is comfortable. I saw her breakfast plate and it looks like she ate about 75% this morning.     Nursing has noted that she has been more tired lately with decreased oral intake. She has declined the weekly COVID surveillance swabs but there are others in her memory care that are positive. No fevers or hypoxia have been reported.     Past Medical and Surgical History reviewed in Epic today.    MEDICATIONS:  Current Outpatient Medications   Medication Sig Dispense Refill     sulfamethoxazole-trimethoprim (BACTRIM DS) 800-160 MG tablet Take 1 tablet by mouth 2 times daily 6 tablet 0     acetaminophen (TYLENOL) 325 MG tablet TAKE TWO TABLETS (650MG) BY MOUTH EVERY 6 HOURS AS NEEDED FOR PAIN RATED 1-4 60 tablet 97     amLODIPine (NORVASC) 2.5 MG tablet TAKE 1 TABLET BY MOUTH EVERY MORNING 28 tablet 97     ammonium lactate (AMLACTIN) 12 % external cream Apply topically daily as needed for dry skin       ASPIRIN LOW DOSE 81 MG chewable tablet CHEW AND SWALLOW ONE TABLET BY MOUTH ONCE DAILY 28 tablet 97     bisacodyl (DULCOLAX) 10 MG suppository Place 10 mg rectally daily as needed for constipation       escitalopram (LEXAPRO) 10 MG tablet TAKE 1 TABLET BY MOUTH EVERY  "MORNING 28 tablet PRN     estradiol (ESTRACE) 0.5 MG tablet TAKE ONE-HALF TABLET (0.25MG) BY MOUTH ONCE DAILY 14 tablet 97     guaiFENesin (ROBAFEN) 100 MG/5ML SYRP Take 10 mLs by mouth 2 times daily as needed for cough 473 mL 97     levETIRAcetam (KEPPRA) 250 MG tablet TAKE 1 TABLET BY MOUTH ONCE DAILY 30 tablet PRN     melatonin 3 MG tablet TAKE 1 TABLET BY MOUTH ONCE DAILY 30 tablet PRN     Nutritional Supplements (ENSURE PLUS) LIQD Take by mouth 2 times daily       polyethylene glycol (MIRALAX) 17 GM/Dose powder MIX 17GM OF POWDER IN 8OZ OF WATER UNTIL COMPLETELY DISSOLVED. DRINK SOLUTION BY MOUTH ONCE DAILY 510 g 97     predniSONE (DELTASONE) 10 MG tablet TAKE 1 TABLET BY MOUTH EVERY MORNING WITH FOOD 28 tablet 97     Sennosides (EQ NATURAL VEGETABLE LAXATIVE PO) Take 8.6 mg by mouth 2 times daily as needed        REVIEW OF SYSTEMS:  Limited secondary to cognitive impairment    Objective:  /64   Pulse 64   Temp 98.8  F (37.1  C)   Resp 18   Ht 1.626 m (5' 4\")   Wt 61.2 kg (135 lb)   SpO2 98%   BMI 23.17 kg/m    Exam:  GENERAL APPEARANCE:  Alert, in no distress, pleasant, cooperative, resting on couch  EYES: no discharge or mattering on lids or lashes noted  ENT:  moist mucous membranes, hearing acuity intact  NECK: supple, symmetrical  RESP: no respiratory distress, Lung sounds clear, patient is on room air  CV:  rate and rhythm regular. Edema none in bilateral lower extremities. VASCULAR: warm extremities without open areas.  ABDOMEN: normal bowel sounds, soft, nontender.  M/S:   Gait and station: ambulates independently with walker, no tenderness or swelling of the joints; able to move all extremities   SKIN:  Inspection and palpation of skin and subcutaneous tissue: skin warm, dry and intact without rashes  NEURO: no facial asymmetry, no speech deficits and able to follow directions, moves all extremities symmetrically  PSYCH:  insight, judgement, and memory impaired affect and mood " normal    Labs:   reviewed    ASSESSMENT/PLAN:  Malaise  COPD  Has been sleeping more lately and decreased oral intake but appears that she ate well for breakfast today. Nursing today reported that she came out of room yesterday and seems at baseline. COVID is a possibility but she has not allowed for testing, temp has been 97s with oxygen saturations of 90-99%, she does have underlying COPD so sats in the low 90s are not abnormal for her. There is no s/sx of COPD exacerbation today. Also wonder about a UTI which she has had in the past and there is a strong urine odor in her room. Staff has attempted to collect a urine specimen but Dylan has removed the collection hat when she uses the restroom. Will treat with a 3 day course in the event there is an underlying infection. Updated daughter, Nava via bridge and she agrees with the plan.  --start bactrim DS 1 tablet PO q12h x 3 days  --continue with prednisone 10 mg daily for COPD  --continue with daily temp/O2 checks.     Electronically signed by:  CYNDI Miramontes CNP               Sincerely,        CYNDI Miramontes CNP

## 2020-12-17 RX ORDER — SULFAMETHOXAZOLE/TRIMETHOPRIM 800-160 MG
1 TABLET ORAL 2 TIMES DAILY
Qty: 6 TABLET | Refills: 0 | COMMUNITY
Start: 2020-12-17 | End: 2021-01-05

## 2020-12-23 ENCOUNTER — RECORDS - HEALTHEAST (OUTPATIENT)
Dept: LAB | Facility: CLINIC | Age: 85
End: 2020-12-23

## 2020-12-23 LAB
SARS-COV-2 PCR COMMENT: NORMAL
SARS-COV-2 RNA SPEC QL NAA+PROBE: NEGATIVE
SARS-COV-2 VIRUS SPECIMEN SOURCE: NORMAL

## 2021-01-03 ENCOUNTER — TELEPHONE (OUTPATIENT)
Dept: GERIATRICS | Facility: CLINIC | Age: 86
End: 2021-01-03

## 2021-01-03 NOTE — TELEPHONE ENCOUNTER
Patient with Hx of back shingles and shingle vaccination, had red-sclera few days ago, now red sclera with rash around orbit and spreads up forehead, area painful, eyelid edema, vision intact, VS WNL, 96.1, potential shingles.  -valcyclovir 1000mg TID x7d  -APAP PRN for pain

## 2021-01-04 NOTE — PROGRESS NOTES
"[place] [facility]  [place] Medical Record Number: [id number]  Place of Service where encounter took place: [facility] [place] ([facility]) [[id number]]  Chief Complaint   Patient presents with     RECHECK       HPI:    Zoraida Olivarez is a 86 year old (4/11/1934), who is being seen today for an episodic care visit. HPI information obtained from: facility chart records, facility staff, patient report, [facility] chart review and Care Everywhere [facility] chart review and daughter Nava.     Received a message over the weekend of a rash that developed on Erlin right forehead extending to the nose. There was intermittent pain. She was started on valacyclovir on Sunday morning. Today she denies pain. When asked if changes of vision, \"I just want this to be over with.\" asked if she was able to see okay, \"yes\". She has been sleeping well. Often stays in her room throughout the day. Was eating breakfast in a dark room this morning during our visit.     Past Medical and Surgical History reviewed in Epic today.    MEDICATIONS:  Current Outpatient Medications   Medication Sig Dispense Refill     acetaminophen (TYLENOL) 325 MG tablet TAKE TWO TABLETS (650MG) BY MOUTH EVERY 6 HOURS AS NEEDED FOR PAIN RATED 1-4 60 tablet 97     amLODIPine (NORVASC) 2.5 MG tablet TAKE 1 TABLET BY MOUTH EVERY MORNING 28 tablet 97     ammonium lactate (AMLACTIN) 12 % external cream Apply topically daily as needed for dry skin       ASPIRIN LOW DOSE 81 MG chewable tablet CHEW AND SWALLOW ONE TABLET BY MOUTH ONCE DAILY 28 tablet 97     bisacodyl (DULCOLAX) 10 MG suppository Place 10 mg rectally daily as needed for constipation       escitalopram (LEXAPRO) 10 MG tablet TAKE 1 TABLET BY MOUTH EVERY MORNING 28 tablet PRN     estradiol (ESTRACE) 0.5 MG tablet TAKE ONE-HALF TABLET (0.25MG) BY MOUTH ONCE DAILY 14 tablet 97     guaiFENesin (ROBAFEN) 100 MG/5ML SYRP Take 10 mLs by mouth 2 times daily as needed for " "cough 473 mL 97     levETIRAcetam (KEPPRA) 250 MG tablet TAKE 1 TABLET BY MOUTH ONCE DAILY 30 tablet PRN     melatonin 3 MG tablet TAKE 1 TABLET BY MOUTH ONCE DAILY 30 tablet PRN     Nutritional Supplements (ENSURE PLUS) LIQD Take by mouth 2 times daily       polyethylene glycol (MIRALAX) 17 GM/Dose powder MIX 17GM OF POWDER IN 8OZ OF WATER UNTIL COMPLETELY DISSOLVED. DRINK SOLUTION BY MOUTH ONCE DAILY 510 g 97     predniSONE (DELTASONE) 10 MG tablet TAKE 1 TABLET BY MOUTH EVERY MORNING WITH FOOD 28 tablet 97     Sennosides (EQ NATURAL VEGETABLE LAXATIVE PO) Take 8.6 mg by mouth 2 times daily as needed        REVIEW OF SYSTEMS:  Limited secondary to cognitive impairment    Objective:  /74   Pulse 64   Temp 97.5  F (36.4  C)   Resp 18   Ht 1.626 m (5' 4\")   Wt 65.8 kg (145 lb)   SpO2 93%   BMI 24.89 kg/m    Exam:  GENERAL APPEARANCE:  Alert, in no distress, pleasant, cooperative  EYES: right eye with mild inflammation of the eyelids, sclera red  ENT:  moist mucous membranes, hearing acuity intact  NECK: supple, symmetrical  RESP: no respiratory distress, , patient is on room air  CV:  Edema none in bilateral lower extremities.   M/S:   Gait and station: ambulates without assistive device, no tenderness or swelling of the joints; able to move all extremities   SKIN:  Inspection and palpation of skin and subcutaneous tissue: rash on right forehead, extending to the nose with vesicular appearance that is more dry and healing from two days prior.   NEURO: no facial asymmetry, no speech deficits and able to follow directions, moves all extremities symmetrically  PSYCH:  insight, judgement, and memory impaired affect and mood normal    Labs:   CBC RESULTS:   Recent Labs   Lab Test 06/11/19 05/01/19   WBC 8.9 9.5   RBC 5.03 5.43*   HGB 14.3 15.8*   HCT 44.8 47.9*   MCV 89 88   MCH 28.4 29.1   MCHC 31.9* 33.0   RDW 14.6* 13.8   * 527*       Last Basic Metabolic Panel:  Recent Labs   Lab Test 06/11/19 " 05/01/19    147*   POTASSIUM 3.6 3.3*   CHLORIDE 104 109*   DIANE 9.4 9.6   CO2 29 27   BUN 14 23   CR 0.71 0.85    92     ASSESSMENT/PLAN:  (B02.9) Herpes zoster without complication  (primary encounter diagnosis)  Comment: started on valacyclovir over the weekend and the rash appears improved. The eye does not look infected but looks irritated. Steroid ointment was ordered yesterday but has not been received from pharmacy.   Plan: continue with valcyclovir 1000 mg TID x 7 days with last dose on 1/9/21.  --dexamethasone ointment. Apply to right eye TID x 7 days.     (G30.1,  F02.80) Late onset Alzheimer's disease without behavioral disturbance (H)  Comment: progressive. Not tracking as well today. Spends a lot of time in her room so could be declining due to isolation.   Plan: continue with memory care assisted living for assistance with cares, meals and medications.   --encourage more socialization with other residents on the unit.   --care conference scheduled for 1/14/21.     (J44.9) Chronic obstructive pulmonary disease, unspecified COPD type (H)  Comment: breathing comfortably, controlled with oral prednisone. Was on oxygen in the past but has not needed for about a year.   Plan: continue with prednisone 10 mg daily    (F32.4) Major depressive disorder in partial remission, unspecified whether recurrent (H)  Comment: more withdrawn today. Wonder if the isolation is causing more depressive symptoms.   Plan: encourage more socialization and activities on the unit.   --lexapro 10 mg daily    (I48.0) PAF (paroxysmal atrial fibrillation) (H)  Comment: HR generally 60-80s without rate controlled medications.   Plan: continue with asa 81 mg daily    Electronically signed by:  CYNDI Miramontes CNP

## 2021-01-05 ENCOUNTER — ASSISTED LIVING VISIT (OUTPATIENT)
Dept: GERIATRICS | Facility: CLINIC | Age: 86
End: 2021-01-05
Payer: MEDICARE

## 2021-01-05 VITALS
OXYGEN SATURATION: 93 % | TEMPERATURE: 97.5 F | BODY MASS INDEX: 24.75 KG/M2 | RESPIRATION RATE: 18 BRPM | HEART RATE: 64 BPM | HEIGHT: 64 IN | SYSTOLIC BLOOD PRESSURE: 120 MMHG | WEIGHT: 145 LBS | DIASTOLIC BLOOD PRESSURE: 74 MMHG

## 2021-01-05 DIAGNOSIS — B02.9 HERPES ZOSTER WITHOUT COMPLICATION: Primary | ICD-10-CM

## 2021-01-05 DIAGNOSIS — G30.1 LATE ONSET ALZHEIMER'S DISEASE WITHOUT BEHAVIORAL DISTURBANCE (H): ICD-10-CM

## 2021-01-05 DIAGNOSIS — I48.0 PAF (PAROXYSMAL ATRIAL FIBRILLATION) (H): ICD-10-CM

## 2021-01-05 DIAGNOSIS — F02.80 LATE ONSET ALZHEIMER'S DISEASE WITHOUT BEHAVIORAL DISTURBANCE (H): ICD-10-CM

## 2021-01-05 DIAGNOSIS — F32.4 MAJOR DEPRESSIVE DISORDER IN PARTIAL REMISSION, UNSPECIFIED WHETHER RECURRENT (H): ICD-10-CM

## 2021-01-05 DIAGNOSIS — J44.9 CHRONIC OBSTRUCTIVE PULMONARY DISEASE, UNSPECIFIED COPD TYPE (H): ICD-10-CM

## 2021-01-05 ASSESSMENT — MIFFLIN-ST. JEOR: SCORE: 1082.72

## 2021-01-05 NOTE — LETTER
"    1/5/2021        RE: Zoraida Olivarez  C/o Nava Teo  [address] [facility] [address]        [place] [facility]  [place] Medical Record Number: [id number]  Place [facility] Service where encounter [facility] [[id number]]  Chief Complaint   Patient presents with     RECHECK       HPI:    Zoraida Olivarez is a 86 year old (4/11/1934), who is being seen today for an episodic care visit. HPI information obtained from: facility chart records, facility staff, patient report, [facility] chart review and Care Everywhere [facility] chart review and daughter Nava.     Received a message over the weekend of a rash that developed on Erlin right forehead extending to the nose. There was intermittent pain. She was started on valacyclovir on Sunday morning. Today she denies pain. When asked if changes of vision, \"I just want this to be over with.\" asked if she was able to see okay, \"yes\". She has been sleeping well. Often stays in her room throughout the day. Was eating breakfast in a dark room this morning during our visit.     Past Medical and Surgical History reviewed in Epic today.    MEDICATIONS:  Current Outpatient Medications   Medication Sig Dispense Refill     acetaminophen (TYLENOL) 325 MG tablet TAKE TWO TABLETS (650MG) BY MOUTH EVERY 6 HOURS AS NEEDED FOR PAIN RATED 1-4 60 tablet 97     amLODIPine (NORVASC) 2.5 MG tablet TAKE 1 TABLET BY MOUTH EVERY MORNING 28 tablet 97     ammonium lactate (AMLACTIN) 12 % external cream Apply topically daily as needed for dry skin       ASPIRIN LOW DOSE 81 MG chewable tablet CHEW AND SWALLOW ONE TABLET BY MOUTH ONCE DAILY 28 tablet 97     bisacodyl (DULCOLAX) 10 MG suppository Place 10 mg rectally daily as needed for constipation       escitalopram (LEXAPRO) 10 MG tablet TAKE 1 TABLET BY MOUTH EVERY MORNING 28 tablet PRN     estradiol (ESTRACE) 0.5 MG tablet TAKE ONE-HALF TABLET (0.25MG) BY MOUTH ONCE " "DAILY 14 tablet 97     guaiFENesin (ROBAFEN) 100 MG/5ML SYRP Take 10 mLs by mouth 2 times daily as needed for cough 473 mL 97     levETIRAcetam (KEPPRA) 250 MG tablet TAKE 1 TABLET BY MOUTH ONCE DAILY 30 tablet PRN     melatonin 3 MG tablet TAKE 1 TABLET BY MOUTH ONCE DAILY 30 tablet PRN     Nutritional Supplements (ENSURE PLUS) LIQD Take by mouth 2 times daily       polyethylene glycol (MIRALAX) 17 GM/Dose powder MIX 17GM OF POWDER IN 8OZ OF WATER UNTIL COMPLETELY DISSOLVED. DRINK SOLUTION BY MOUTH ONCE DAILY 510 g 97     predniSONE (DELTASONE) 10 MG tablet TAKE 1 TABLET BY MOUTH EVERY MORNING WITH FOOD 28 tablet 97     Sennosides (EQ NATURAL VEGETABLE LAXATIVE PO) Take 8.6 mg by mouth 2 times daily as needed        REVIEW OF SYSTEMS:  Limited secondary to cognitive impairment    Objective:  /74   Pulse 64   Temp 97.5  F (36.4  C)   Resp 18   Ht 1.626 m (5' 4\")   Wt 65.8 kg (145 lb)   SpO2 93%   BMI 24.89 kg/m    Exam:  GENERAL APPEARANCE:  Alert, in no distress, pleasant, cooperative  EYES: right eye with mild inflammation of the eyelids, sclera red  ENT:  moist mucous membranes, hearing acuity intact  NECK: supple, symmetrical  RESP: no respiratory distress, , patient is on room air  CV:  Edema none in bilateral lower extremities.   M/S:   Gait and station: ambulates without assistive device, no tenderness or swelling of the joints; able to move all extremities   SKIN:  Inspection and palpation of skin and subcutaneous tissue: rash on right forehead, extending to the nose with vesicular appearance that is more dry and healing from two days prior.   NEURO: no facial asymmetry, no speech deficits and able to follow directions, moves all extremities symmetrically  PSYCH:  insight, judgement, and memory impaired affect and mood normal    Labs:   CBC RESULTS:   Recent Labs   Lab Test 06/11/19 05/01/19   WBC 8.9 9.5   RBC 5.03 5.43*   HGB 14.3 15.8*   HCT 44.8 47.9*   MCV 89 88   MCH 28.4 29.1   MCHC 31.9* " 33.0   RDW 14.6* 13.8   * 527*       Last Basic Metabolic Panel:  Recent Labs   Lab Test 06/11/19 05/01/19    147*   POTASSIUM 3.6 3.3*   CHLORIDE 104 109*   DIANE 9.4 9.6   CO2 29 27   BUN 14 23   CR 0.71 0.85    92     ASSESSMENT/PLAN:  (B02.9) Herpes zoster without complication  (primary encounter diagnosis)  Comment: started on valacyclovir over the weekend and the rash appears improved. The eye does not look infected but looks irritated. Steroid ointment was ordered yesterday but has not been received from pharmacy.   Plan: continue with valcyclovir 1000 mg TID x 7 days with last dose on 1/9/21.  --dexamethasone ointment. Apply to right eye TID x 7 days.     (G30.1,  F02.80) Late onset Alzheimer's disease without behavioral disturbance (H)  Comment: progressive. Not tracking as well today. Spends a lot of time in her room so could be declining due to isolation.   Plan: continue with memory care assisted living for assistance with cares, meals and medications.   --encourage more socialization with other residents on the unit.   --care conference scheduled for 1/14/21.     (J44.9) Chronic obstructive pulmonary disease, unspecified COPD type (H)  Comment: breathing comfortably, controlled with oral prednisone. Was on oxygen in the past but has not needed for about a year.   Plan: continue with prednisone 10 mg daily    (F32.4) Major depressive disorder in partial remission, unspecified whether recurrent (H)  Comment: more withdrawn today. Wonder if the isolation is causing more depressive symptoms.   Plan: encourage more socialization and activities on the unit.   --lexapro 10 mg daily    (I48.0) PAF (paroxysmal atrial fibrillation) (H)  Comment: HR generally 60-80s without rate controlled medications.   Plan: continue with asa 81 mg daily    Electronically signed by:  CYNDI Miramontes CNP               Sincerely,        CYNDI Miramontes CNP

## 2021-01-05 NOTE — PROGRESS NOTES
[place] [facility]  NON-FACE TO FACE PROLONGED SERVICE    Zoraida Olivarez 4/11/1934    Date of Related Face to Face Service: 1/13/21    INTENT OF SERVICE  This is an extended evaluation of patient's specific problem.  The service relates to a recent or future E/M visit.  Documentation supports medical necessity, relates to ongoing patient management and documents start times and stop times.  * I discussed with Amos (son), Nava (daugther)  Who are children of the patient. Linda Goode present for the conference call.  (Start time 10:58  Stop time 11:32),     Regarding the following diagnoses:     Herpes zoster without complication  Reported that the rash itself is looking improved, the eye however, is still red and looks irritated but there are no s/sx of infection. Steroid ointment was ordered but after the care conference, a note from pharmacy was received that insurance required a prior authorization for the ointment so this has not been given.   --will now start pharmaceutical equivalent that insurance will cover and will continue it for one week due to the eye continuing to look irritated.      Late onset Alzheimer's disease without behavioral disturbance (H)  Appears to be progressing. Dylan overall appears at her baseline but has had some longer processing (or response) times to questions. Her weight has increased now to 140 lbs which is an improvement as well. She does have some occasional incontinence which can be expected with progressive dementia. Daughter has noted fatigue so we are going to check labs on the next lab day to ensure there are no electrolyte abnormalities, worsening kidney function, low vitamin D, HGB or TSH.   --will obtain BMP, CBC, TSH, and vitamin D on next lab day.   --goals of care are geared towards comfort and she has been on hospice in the past. She appears to be doing well in regards to the dementia so we will continue to manage symptoms as needed as she  would not qualify for hospice at this time.      Chronic obstructive pulmonary disease, unspecified COPD type (H)  Was on oxygen in the past. Nava has noted Dylan to be more winded when walking to the restroom. This could be due to anemia vs deconditioning vs underlying COPD. We discussed starting an inhaler (combivent) for a controller medication for this. Nava will speak with Dylan about the inhaler as she did not like to do a neb in the past so she may not want to do an inhaler in the present. We discussed the prednisone that was started while on hospice, and we agreed that she should stay on this as she has not had any exacerbations or wheezing.   --check HGB on next lab day  --consider starting combivent inhaler if Dylan agrees    Major depressive disorder in partial remission, unspecified whether recurrent (H),   lexapro was increased upon moving into the AL. She spends a lot of time in her room where the shades are drawn so the room is dark. Could be due to under treated depression but would like to rule out vitamin D deficiency or hypothyroidism prior to starting anther agent such as mirtazapine.   --labs on the next lab day  --continue with lexapro 10 mg daily  --staff to encourage Dylan to attend meals and activities outside of her room    ASSESSMENT/PLAN  Please see above.     TIME  Total time spent with Non-Face to Face prolonged service 34 minutes.     Electronically signed by:  CYNDI Miramontes CNP

## 2021-01-13 ENCOUNTER — ASSISTED LIVING VISIT (OUTPATIENT)
Dept: GERIATRICS | Facility: CLINIC | Age: 86
End: 2021-01-13
Payer: MEDICARE

## 2021-01-13 ENCOUNTER — TELEPHONE (OUTPATIENT)
Dept: GERIATRICS | Facility: CLINIC | Age: 86
End: 2021-01-13

## 2021-01-13 VITALS
HEART RATE: 87 BPM | HEIGHT: 64 IN | SYSTOLIC BLOOD PRESSURE: 130 MMHG | RESPIRATION RATE: 19 BRPM | BODY MASS INDEX: 24.17 KG/M2 | TEMPERATURE: 97.5 F | WEIGHT: 141.6 LBS | DIASTOLIC BLOOD PRESSURE: 90 MMHG | OXYGEN SATURATION: 94 %

## 2021-01-13 VITALS
TEMPERATURE: 97.5 F | OXYGEN SATURATION: 94 % | BODY MASS INDEX: 24.16 KG/M2 | HEIGHT: 64 IN | RESPIRATION RATE: 19 BRPM | WEIGHT: 141.54 LBS | HEART RATE: 87 BPM | DIASTOLIC BLOOD PRESSURE: 90 MMHG | SYSTOLIC BLOOD PRESSURE: 130 MMHG

## 2021-01-13 DIAGNOSIS — B02.9 HERPES ZOSTER WITHOUT COMPLICATION: Primary | ICD-10-CM

## 2021-01-13 ASSESSMENT — MIFFLIN-ST. JEOR
SCORE: 1067
SCORE: 1067.29

## 2021-01-13 NOTE — LETTER
"    1/13/2021        RE: Zoraida Olivarez  C/o Nava Teo  [address] [facility] [address]        [place] [facility]    Chief Complaint   Patient presents with     RECHECK     [place] Medical Record Number: [id number]  Place of Service where encounter took place: [facility] [place] ([facility]) [[id number]]    HPI:    Zoraida Olivarez is a 86 year old (4/11/1934), who is being seen today for an episodic care visit. HPI information obtained from: facility chart records, facility staff, patient report and [place] Epic chart review.     Today's concern is:  Ms. Olivarez was visited today while in her room eating lunch. Has some eye discomfort, no swelling or discharge noted. Denies vision changes. \"I just want this to go away.\" thinks right eye is getting better.     REVIEW OF SYSTEMS:  Limited secondary to cognitive impairment but today pt reports above.     BP (!) 130/90   Pulse 87   Temp 97.5  F (36.4  C)   Resp 19   Ht 1.626 m (5' 4\")   Wt 64.2 kg (141 lb 9.6 oz)   SpO2 94%   BMI 24.31 kg/m    GENERAL APPEARANCE:  Alert, in no distress, pleasant, cooperative  EYES: right eye without inflammation of the eyelids, sclera red  ENT:  moist mucous membranes, hearing acuity intact  RESP: no respiratory distress, patient is on room air  M/S:   Gait and station: ambulates without assistive device  SKIN:  Inspection and palpation of skin and subcutaneous tissue: rash on right forehead, extending to the nose that has improved and now scabbed.  NEURO: no facial asymmetry, no speech deficits and able to follow directions, moves all extremities symmetrically  PSYCH:  insight, judgement, and memory impaired affect and mood normal    ASSESSMENT/PLAN:  (B02.9) Herpes zoster without complication  (primary encounter diagnosis)  Comment: has now completed course of valcyclovir, rash now much improved. Also had a week of dexamethasone eye ointment so no further eyelid " swelling but sclera appears red. No drainage or s/sx of infection so no abx are warranted at this time but would like to order eye ointment to keep the eye moisturized.   Plan: systane eye ointment. Apply to right eyelid BID in the a.m and at HS.   --monitor for vision changes.     Electronically signed by:  CYNDI Miramontes CNP        Sincerely,        CYNDI Miramontes CNP

## 2021-01-13 NOTE — PROGRESS NOTES
"[place] [facility]    Chief Complaint   Patient presents with     YESSY     [place] Medical Record Number: [id number]  [facility] encounter took [facility] [[id number]]    HPI:    Zoraida Olivarez is a 86 year old (4/11/1934), who is being seen today for an episodic care visit. HPI information obtained from: facility chart records, facility staff, patient report and [facility] chart review.     Today's concern is:  Ms. Olivarez was visited today while in her room eating lunch. Has some eye discomfort, no swelling or discharge noted. Denies vision changes. \"I just want this to go away.\" thinks right eye is getting better.     REVIEW OF SYSTEMS:  Limited secondary to cognitive impairment but today pt reports above.     BP (!) 130/90   Pulse 87   Temp 97.5  F (36.4  C)   Resp 19   Ht 1.626 m (5' 4\")   Wt 64.2 kg (141 lb 9.6 oz)   SpO2 94%   BMI 24.31 kg/m    GENERAL APPEARANCE:  Alert, in no distress, pleasant, cooperative  EYES: right eye without inflammation of the eyelids, sclera red  ENT:  moist mucous membranes, hearing acuity intact  RESP: no respiratory distress, patient is on room air  M/S:   Gait and station: ambulates without assistive device  SKIN:  Inspection and palpation of skin and subcutaneous tissue: rash on right forehead, extending to the nose that has improved and now scabbed.  NEURO: no facial asymmetry, no speech deficits and able to follow directions, moves all extremities symmetrically  PSYCH:  insight, judgement, and memory impaired affect and mood normal    ASSESSMENT/PLAN:  (B02.9) Herpes zoster without complication  (primary encounter diagnosis)  Comment: has now completed course of valcyclovir, rash now much improved. Also had a week of dexamethasone eye ointment so no further eyelid swelling but sclera appears red. No drainage or s/sx of infection so no abx are warranted at this time but would like " to order eye ointment to keep the eye moisturized.   Plan: systane eye ointment. Apply to right eyelid BID in the a.m and at HS.   --monitor for vision changes.     Electronically signed by:  CYNDI Miramontes CNP

## 2021-01-13 NOTE — TELEPHONE ENCOUNTER
There is no order for this in the patient's chart. Please update chart with current directions and diagnosis and route back so I can continue with PA.   Yes No

## 2021-01-13 NOTE — TELEPHONE ENCOUNTER
TOBRADEX OINT. 0.3-0.1%:   APPLY A THIN COATING TO SAC IN THE RIGHT EYE TID FOR 7 DAYS. Quantity of 3.5gm.  Insurance phone number: [phone]

## 2021-01-14 ENCOUNTER — VIRTUAL VISIT (OUTPATIENT)
Dept: GERIATRICS | Facility: CLINIC | Age: 86
End: 2021-01-14
Payer: MEDICARE

## 2021-01-14 DIAGNOSIS — G30.1 LATE ONSET ALZHEIMER'S DISEASE WITHOUT BEHAVIORAL DISTURBANCE (H): ICD-10-CM

## 2021-01-14 DIAGNOSIS — F32.4 MAJOR DEPRESSIVE DISORDER IN PARTIAL REMISSION, UNSPECIFIED WHETHER RECURRENT (H): ICD-10-CM

## 2021-01-14 DIAGNOSIS — J44.9 CHRONIC OBSTRUCTIVE PULMONARY DISEASE, UNSPECIFIED COPD TYPE (H): ICD-10-CM

## 2021-01-14 DIAGNOSIS — F02.80 LATE ONSET ALZHEIMER'S DISEASE WITHOUT BEHAVIORAL DISTURBANCE (H): ICD-10-CM

## 2021-01-14 DIAGNOSIS — B02.9 HERPES ZOSTER WITHOUT COMPLICATION: Primary | ICD-10-CM

## 2021-01-14 PROCEDURE — 99358 PROLONG SERVICE W/O CONTACT: CPT | Mod: 95 | Performed by: NURSE PRACTITIONER

## 2021-01-14 NOTE — LETTER
1/14/2021        RE: Zoraida Rochajuan miguel  C/o Nava Teo  [address] [facility] [address]        [place] [facility]  NON-FACE TO FACE PROLONGED SERVICE    Zoraida Gonzalezsimone 4/11/1934    Date of Related Face to Face Service: 1/13/21    INTENT OF SERVICE  This is an extended evaluation of patient's specific problem.  The service relates to a recent or future E/M visit.  Documentation supports medical necessity, relates to ongoing patient management and documents start times and stop times.  * I discussed with Amos (son), Nava (daugther)  Who are children of the patient. Linda Goode present for the conference call.  (Start time 10:58  Stop time 11:32),     Regarding the following diagnoses:     Herpes zoster without complication  Reported that the rash itself is looking improved, the eye however, is still red and looks irritated but there are no s/sx of infection. Steroid ointment was ordered but after the care conference, a note from pharmacy was received that insurance required a prior authorization for the ointment so this has not been given.   --will now start pharmaceutical equivalent that insurance will cover and will continue it for one week due to the eye continuing to look irritated.      Late onset Alzheimer's disease without behavioral disturbance (H)  Appears to be progressing. Dylan overall appears at her baseline but has had some longer processing (or response) times to questions. Her weight has increased now to 140 lbs which is an improvement as well. She does have some occasional incontinence which can be expected with progressive dementia. Daughter has noted fatigue so we are going to check labs on the next lab day to ensure there are no electrolyte abnormalities, worsening kidney function, low vitamin D, HGB or TSH.   --will obtain BMP, CBC, TSH, and vitamin D on next lab day.   --goals of care are geared towards comfort and she has been on hospice in the past.  She appears to be doing well in regards to the dementia so we will continue to manage symptoms as needed as she would not qualify for hospice at this time.      Chronic obstructive pulmonary disease, unspecified COPD type (H)  Was on oxygen in the past. Nava has noted Dylan to be more winded when walking to the restroom. This could be due to anemia vs deconditioning vs underlying COPD. We discussed starting an inhaler (combivent) for a controller medication for this. Nava will speak with Dylan about the inhaler as she did not like to do a neb in the past so she may not want to do an inhaler in the present. We discussed the prednisone that was started while on hospice, and we agreed that she should stay on this as she has not had any exacerbations or wheezing.   --check HGB on next lab day  --consider starting combivent inhaler if Dylan agrees    Major depressive disorder in partial remission, unspecified whether recurrent (H),   lexapro was increased upon moving into the AL. She spends a lot of time in her room where the shades are drawn so the room is dark. Could be due to under treated depression but would like to rule out vitamin D deficiency or hypothyroidism prior to starting anther agent such as mirtazapine.   --labs on the next lab day  --continue with lexapro 10 mg daily  --staff to encourage Dylan to attend meals and activities outside of her room    ASSESSMENT/PLAN  Please see above.     TIME  Total time spent with Non-Face to Face prolonged service 34 minutes.     Electronically signed by:  CYNDI Miramontes CNP                      Sincerely,        CYNDI Miramontes CNP

## 2021-01-20 DIAGNOSIS — B02.9 HERPES ZOSTER WITHOUT COMPLICATION: Primary | ICD-10-CM

## 2021-01-21 RX ORDER — MINERAL OIL AND WHITE PETROLATUM 30; 940 MG/G; MG/G
OINTMENT OPHTHALMIC
Qty: 3.5 G | Refills: 97 | Status: SHIPPED | OUTPATIENT
Start: 2021-01-21

## 2021-01-24 ENCOUNTER — RECORDS - HEALTHEAST (OUTPATIENT)
Dept: LAB | Facility: CLINIC | Age: 86
End: 2021-01-24

## 2021-01-26 ENCOUNTER — TRANSFERRED RECORDS (OUTPATIENT)
Dept: HEALTH INFORMATION MANAGEMENT | Facility: CLINIC | Age: 86
End: 2021-01-26

## 2021-01-26 LAB
ANION GAP SERPL CALCULATED.3IONS-SCNC: 7 MMOL/L (ref 5–18)
BUN SERPL-MCNC: 26 MG/DL (ref 8–28)
CALCIUM SERPL-MCNC: 8.7 MG/DL (ref 8.5–10.5)
CHLORIDE SERPLBLD-SCNC: 105 MMOL/L (ref 98–107)
CO2 SERPL-SCNC: 29 MMOL/L (ref 22–31)
CREAT SERPL-MCNC: 0.97 MG/DL (ref 0.6–1.1)
DIFFERENTIAL: ABNORMAL
ERYTHROCYTE [DISTWIDTH] IN BLOOD BY AUTOMATED COUNT: 14.9 % (ref 11–14.5)
GFR SERPL CREATININE-BSD FRML MDRD: 54 ML/MIN/1.73M2
GLUCOSE SERPL-MCNC: 154 MG/DL (ref 70–125)
HCT VFR BLD AUTO: 48.6 % (ref 35–47)
HEMOGLOBIN: 16 G/DL (ref 12–16)
MCH RBC QN AUTO: 30.4 PG (ref 27–34)
MCHC RBC AUTO-ENTMCNC: 32.9 G/DL (ref 32–36)
MCV RBC AUTO: 92 FL (ref 80–100)
PLATELET # BLD AUTO: 371 THOU/UL (ref 140–440)
POTASSIUM SERPL-SCNC: 4.1 MMOL/L (ref 3.5–5)
RBC # BLD AUTO: 5.27 MILL/UL (ref 3.8–5.4)
SODIUM SERPL-SCNC: 141 MMOL/L (ref 136–145)
TSH SERPL-ACNC: 0.84 UIU/ML (ref 0.3–5)
WBC # BLD AUTO: 8.5 THOU/UL (ref 4–11)

## 2021-01-27 DIAGNOSIS — K59.01 SLOW TRANSIT CONSTIPATION: Primary | ICD-10-CM

## 2021-01-27 LAB
ANION GAP SERPL CALCULATED.3IONS-SCNC: 7 MMOL/L (ref 5–18)
BASOPHILS # BLD AUTO: 0.1 THOU/UL (ref 0–0.2)
BASOPHILS NFR BLD AUTO: 1 % (ref 0–2)
BUN SERPL-MCNC: 26 MG/DL (ref 8–28)
CALCIUM SERPL-MCNC: 8.7 MG/DL (ref 8.5–10.5)
CHLORIDE BLD-SCNC: 105 MMOL/L (ref 98–107)
CO2 SERPL-SCNC: 29 MMOL/L (ref 22–31)
CREAT SERPL-MCNC: 0.97 MG/DL (ref 0.6–1.1)
EOSINOPHIL # BLD AUTO: 0 THOU/UL (ref 0–0.4)
EOSINOPHIL NFR BLD AUTO: 1 % (ref 0–6)
ERYTHROCYTE [DISTWIDTH] IN BLOOD BY AUTOMATED COUNT: 14.9 % (ref 11–14.5)
GFR SERPL CREATININE-BSD FRML MDRD: 54 ML/MIN/1.73M2
GLUCOSE BLD-MCNC: 154 MG/DL (ref 70–125)
HCT VFR BLD AUTO: 48.6 % (ref 35–47)
HGB BLD-MCNC: 16 G/DL (ref 12–16)
IMM GRANULOCYTES # BLD: 0.1 THOU/UL
IMM GRANULOCYTES NFR BLD: 1 %
LYMPHOCYTES # BLD AUTO: 2.2 THOU/UL (ref 0.8–4.4)
LYMPHOCYTES NFR BLD AUTO: 26 % (ref 20–40)
MCH RBC QN AUTO: 30.4 PG (ref 27–34)
MCHC RBC AUTO-ENTMCNC: 32.9 G/DL (ref 32–36)
MCV RBC AUTO: 92 FL (ref 80–100)
MONOCYTES # BLD AUTO: 0.8 THOU/UL (ref 0–0.9)
MONOCYTES NFR BLD AUTO: 10 % (ref 2–10)
NEUTROPHILS # BLD AUTO: 5.3 THOU/UL (ref 2–7.7)
NEUTROPHILS NFR BLD AUTO: 63 % (ref 50–70)
PLATELET # BLD AUTO: 371 THOU/UL (ref 140–440)
PMV BLD AUTO: 10 FL (ref 8.5–12.5)
POTASSIUM BLD-SCNC: 4.1 MMOL/L (ref 3.5–5)
RBC # BLD AUTO: 5.27 MILL/UL (ref 3.8–5.4)
SODIUM SERPL-SCNC: 141 MMOL/L (ref 136–145)
TSH SERPL DL<=0.005 MIU/L-ACNC: 0.84 UIU/ML (ref 0.3–5)
WBC: 8.5 THOU/UL (ref 4–11)

## 2021-01-28 LAB — 25(OH)D3 SERPL-MCNC: 24.9 NG/ML (ref 30–80)

## 2021-01-29 RX ORDER — SENNOSIDES 8.6 MG
TABLET ORAL
Qty: 30 TABLET | Refills: 97 | Status: SHIPPED | OUTPATIENT
Start: 2021-01-29 | End: 2023-01-24

## 2021-01-30 ENCOUNTER — TELEPHONE (OUTPATIENT)
Dept: GERIATRICS | Facility: CLINIC | Age: 86
End: 2021-01-30

## 2021-01-31 NOTE — TELEPHONE ENCOUNTER
Staff noting new presence of large, purple, raised area on the resident's shin - pain only with palpation. Resident denies trauma to the area - measuring 7.5cm x 6cm. No other s/sx of infection. VSS.    Orders  -Monitor  -Apply warm pack q1h x15min    CYNDI Gunderson, [facility]  [place] Office: [address] [place] [address] [address] [place] [address]   [place] Cell: [phone]  [place] Fax: [phone]    [facility] Offce: [address] [facility] [address] [place] [address]  [facility] Phone: [phone]  [facility] Voicemail: [phone]    Email: [email or web address]

## 2021-02-02 ENCOUNTER — ASSISTED LIVING VISIT (OUTPATIENT)
Dept: GERIATRICS | Facility: CLINIC | Age: 86
End: 2021-02-02
Payer: MEDICARE

## 2021-02-02 VITALS
RESPIRATION RATE: 18 BRPM | DIASTOLIC BLOOD PRESSURE: 71 MMHG | TEMPERATURE: 97.3 F | BODY MASS INDEX: 24.92 KG/M2 | OXYGEN SATURATION: 94 % | SYSTOLIC BLOOD PRESSURE: 133 MMHG | HEART RATE: 76 BPM | HEIGHT: 64 IN | WEIGHT: 146 LBS

## 2021-02-02 DIAGNOSIS — D72.9 WHITE BLOOD CELL ABNORMALITY: Primary | ICD-10-CM

## 2021-02-02 DIAGNOSIS — J44.9 CHRONIC OBSTRUCTIVE PULMONARY DISEASE, UNSPECIFIED COPD TYPE (H): ICD-10-CM

## 2021-02-02 DIAGNOSIS — T14.8XXA BLOOD BLISTER: ICD-10-CM

## 2021-02-02 ASSESSMENT — MIFFLIN-ST. JEOR: SCORE: 1087.25

## 2021-02-02 NOTE — LETTER
2/2/2021        RE: Zoraida Olivarez  C/o Nava Bruce  [address] [facility] [address]        [place] [facility]  [place] Medical Record Number: [id number]  [facility] where encounter took place[facility] [[id number][facility]  Chief Complaint   Patient presents with     RECHECK       HPI:    Zoraida Olivarez is a 86 year old (4/11/1934), who is being seen today for an episodic care visit. HPI information obtained from: facility chart records, facility staff, patient report and [facility] chart review.    Notified by staff last week that Dylan had a hematoma like area on her LLE. She did not recall trauma to the area.     Ms. Olivarez was visited today while in her bed. She has some discomfort to the area when it is touched but it is okay while she is lying in the bed. She feels tired today. Ate breakfast today but in her pajamas, back in bed now. Not feeling hungry for lunch today. Does not note a difference in her breathing since she started the inhaler last week.     Past Medical and Surgical History reviewed in Epic today.    MEDICATIONS:  Current Outpatient Medications   Medication Sig Dispense Refill     ipratropium-albuterol (COMBIVENT RESPIMAT)  MCG/ACT inhaler Inhale 2 puffs into the lungs daily       predniSONE (DELTASONE) 5 MG tablet Take 1.5 tablets (7.5 mg) by mouth daily       acetaminophen (TYLENOL) 325 MG tablet TAKE TWO TABLETS (650MG) BY MOUTH EVERY 6 HOURS AS NEEDED FOR PAIN RATED 1-4 60 tablet 97     amLODIPine (NORVASC) 2.5 MG tablet TAKE 1 TABLET BY MOUTH EVERY MORNING 28 tablet 97     ammonium lactate (AMLACTIN) 12 % external cream Apply topically daily as needed for dry skin       ASPIRIN LOW DOSE 81 MG chewable tablet CHEW AND SWALLOW ONE TABLET BY MOUTH ONCE DAILY 28 tablet 97     bisacodyl (DULCOLAX) 10 MG suppository Place 10 mg rectally daily as needed for constipation       escitalopram (LEXAPRO) 10  "MG tablet TAKE 1 TABLET BY MOUTH EVERY MORNING 28 tablet PRN     estradiol (ESTRACE) 0.5 MG tablet TAKE ONE-HALF TABLET (0.25MG) BY MOUTH ONCE DAILY 14 tablet 97     guaiFENesin (ROBAFEN) 100 MG/5ML SYRP Take 10 mLs by mouth 2 times daily as needed for cough 473 mL 97     levETIRAcetam (KEPPRA) 250 MG tablet TAKE 1 TABLET BY MOUTH ONCE DAILY 30 tablet PRN     melatonin 3 MG tablet TAKE 1 TABLET BY MOUTH ONCE DAILY 30 tablet PRN     Nutritional Supplements (ENSURE PLUS) LIQD Take by mouth 2 times daily       polyethylene glycol (MIRALAX) 17 GM/Dose powder MIX 17GM OF POWDER IN 8OZ OF WATER UNTIL COMPLETELY DISSOLVED. DRINK SOLUTION BY MOUTH ONCE DAILY 510 g 97     Sennosides (EQ NATURAL VEGETABLE LAXATIVE PO) Take 8.6 mg by mouth 2 times daily as needed        sennosides (SENOKOT) 8.6 MG tablet TAKE 1 TABLET BY MOUTH TWICE DAILY AS NEEDED FOR CONSTIPATION 30 tablet 97     White Petrolatum-Mineral Oil (SYSTANE NIGHTTIME) OINT APPLY A THIN RIBBON TO RIGHT LOWER LID TWICE DAILY 3.5 g 97     REVIEW OF SYSTEMS:  Limited secondary to cognitive impairment    Objective:  /71   Pulse 76   Temp 97.3  F (36.3  C)   Resp 18   Ht 1.626 m (5' 4\")   Wt 66.2 kg (146 lb)   SpO2 94%   BMI 25.06 kg/m    Exam:  GENERAL APPEARANCE:  Alert, in no distress, pleasant, cooperative, resting in bed  EYES: no discharge or mattering on lids or lashes noted  ENT:  moist mucous membranes, hearing acuity intact  NECK: supple, symmetrical  RESP: no respiratory distress, Lung sounds clear, patient is on room air  CV:  rate and rhythm regular. Edema none in bilateral lower extremities.   ABDOMEN: normal bowel sounds, soft, nontender.  M/S:   Gait and station: ambulates independently with walker, no tenderness or swelling of the joints; able to move all extremities   SKIN:  Inspection and palpation of skin and subcutaneous tissue: quarter dollar sized blood blister on the lateral aspect of the left lower extremity  NEURO: no facial " asymmetry, no speech deficits and able to follow directions, moves all extremities symmetrically  PSYCH:  insight, judgement, and memory impaired affect and mood flat    Labs:   CBC RESULTS:   Recent Labs   Lab Test 01/26/21 06/11/19   WBC 8.5 8.9   RBC 5.27 5.03   HGB 16.0 14.3   HCT 48.6* 44.8   MCV 92 89   MCH 30.4 28.4   MCHC 32.9 31.9*   RDW 14.9* 14.6*    530*       Last Basic Metabolic Panel:  Recent Labs   Lab Test 01/26/21 06/11/19    140   POTASSIUM 4.1 3.6   CHLORIDE 105 104   DIANE 8.7 9.4   CO2 29 29   BUN 26 14   CR 0.97 0.71   * 114       TSH   Date Value Ref Range Status   01/26/2021 0.84 0.30 - 5.00 uIU/mL Final       ASSESSMENT/PLAN:  (D72.9) White blood cell abnormality  (primary encounter diagnosis)  Comment: immature granulocyte 1 and immature granulocyte absolute count 0.1. spent >30 minutes consulting with Dr. Robles and discussing plan with daughter, Nava. Looking back on prior labs, she has had an elevated granulocyte count since 2019 but that was a level of 0.6 so it is higher now. Possible causes could be due to recent viral infection vs oral prednisone use. Also may be polycythemia vera (has high hgb and has had elevated platelets in the past). We discussed that further work up would include a bone marrow biopsy and if polycythemia, the treatment options. At this time, Nava is not interested in further work up or treatment.   Plan: will decrease prednisone to 7.5 mg daily and recheck CBC with diff in 3 months if family would like to know the count after changing the prednisone dose.     (J44.9) Chronic obstructive pulmonary disease, unspecified COPD type (H)  Comment: started on inhaler last week in hopes it would help with the dyspnea with exertion but Dylan does not note a change in her breathing since starting.   Plan:   --continue with combivent respimat 2 puffs daily  --prednisone 7.5 mg daily    (T14.8XXA) Blood blister  Comment: unknown etiology but likely bumped her  leg on something as she was ambulating.   Plan: prevent the blister from opening up if possible  --will ask staff to cover with a light cover such as tubi  or kerlix.   --can use ice if needed.  --if blister opens up, may need to have home care RN jane.     Electronically signed by:  CYNDI Miramontes CNP               Sincerely,        CYNDI Miramontes CNP

## 2021-02-03 RX ORDER — PREDNISONE 5 MG/1
7.5 TABLET ORAL DAILY
COMMUNITY
Start: 2021-02-03 | End: 2022-01-01

## 2021-02-24 ENCOUNTER — RECORDS - HEALTHEAST (OUTPATIENT)
Dept: LAB | Facility: CLINIC | Age: 86
End: 2021-02-24

## 2021-03-02 ENCOUNTER — ASSISTED LIVING VISIT (OUTPATIENT)
Dept: GERIATRICS | Facility: CLINIC | Age: 86
End: 2021-03-02
Payer: MEDICARE

## 2021-03-02 VITALS
DIASTOLIC BLOOD PRESSURE: 72 MMHG | HEART RATE: 74 BPM | WEIGHT: 143 LBS | HEIGHT: 64 IN | RESPIRATION RATE: 18 BRPM | TEMPERATURE: 97.4 F | OXYGEN SATURATION: 96 % | SYSTOLIC BLOOD PRESSURE: 113 MMHG | BODY MASS INDEX: 24.41 KG/M2

## 2021-03-02 DIAGNOSIS — J44.9 CHRONIC OBSTRUCTIVE PULMONARY DISEASE, UNSPECIFIED COPD TYPE (H): Primary | ICD-10-CM

## 2021-03-02 DIAGNOSIS — F32.4 MAJOR DEPRESSIVE DISORDER IN PARTIAL REMISSION, UNSPECIFIED WHETHER RECURRENT (H): ICD-10-CM

## 2021-03-02 DIAGNOSIS — I48.0 PAF (PAROXYSMAL ATRIAL FIBRILLATION) (H): ICD-10-CM

## 2021-03-02 DIAGNOSIS — I10 BENIGN ESSENTIAL HYPERTENSION: ICD-10-CM

## 2021-03-02 DIAGNOSIS — F02.80 LATE ONSET ALZHEIMER'S DISEASE WITHOUT BEHAVIORAL DISTURBANCE (H): ICD-10-CM

## 2021-03-02 DIAGNOSIS — G30.1 LATE ONSET ALZHEIMER'S DISEASE WITHOUT BEHAVIORAL DISTURBANCE (H): ICD-10-CM

## 2021-03-02 DIAGNOSIS — R13.10 DYSPHAGIA, UNSPECIFIED TYPE: ICD-10-CM

## 2021-03-02 ASSESSMENT — MIFFLIN-ST. JEOR: SCORE: 1073.64

## 2021-03-02 NOTE — PROGRESS NOTES
"[place] [facility]  [place] Medical Record Number:  [id number]  Place of Service where encounter took place:  [facility] LIVING  [place] ([facility]) [[id number]]  Chief Complaint   Patient presents with     RECHECK       HPI:    Zoraida Olivarez  is a 86 year old (4/11/1934), who is being seen today for an episodic care visit.  HPI information obtained from: facility chart records, facility staff, patient report and [facility] chart review.     Ms. Olivarez was visited today while resting in bed. States that she didn't sleep well last night, asked what was keeping her awake at night, \"I don't know.\" She denies pain or discomfort. States that she is ready for lunch but prefers to eat in her room. Her breakfast tray was on the table and it looks like Dylan ate about 40% of the meal.     Bridge message from nursing on 2/22/21:  \"Resident trying to get out standing by the door, stating trying to get downstairs. Writer informing resident she is on the first floor already, resident would not tell writer what she is trying to do on first floor. Writer and multiple staff trying to engage resident in activity, offering snack and coffee, trying to get to sit with other residents and unsuccessful. Resident currently sitting on chair and keeping an eye on door, waiting for it to open.\"  When her daughter was visiting her, she had her clothes packed and ready to leave the facility. She was weepy with the home care nurse on 3/1/21. She was calm and pleasant today, asking that I call and speak with her daughter, Nava.     Past Medical and Surgical History reviewed in Epic today.    MEDICATIONS:    Current Outpatient Medications   Medication Sig Dispense Refill     acetaminophen (TYLENOL) 325 MG tablet TAKE TWO TABLETS (650MG) BY MOUTH EVERY 6 HOURS AS NEEDED FOR PAIN RATED 1-4 60 tablet 97     amLODIPine (NORVASC) 2.5 MG tablet TAKE 1 TABLET BY MOUTH EVERY MORNING 28 tablet 97     ammonium " "lactate (AMLACTIN) 12 % external cream Apply topically daily as needed for dry skin       ASPIRIN LOW DOSE 81 MG chewable tablet CHEW AND SWALLOW ONE TABLET BY MOUTH ONCE DAILY 28 tablet 97     bisacodyl (DULCOLAX) 10 MG suppository Place 10 mg rectally daily as needed for constipation       escitalopram (LEXAPRO) 10 MG tablet TAKE 1 TABLET BY MOUTH EVERY MORNING 28 tablet PRN     estradiol (ESTRACE) 0.5 MG tablet TAKE ONE-HALF TABLET (0.25MG) BY MOUTH ONCE DAILY 14 tablet 97     guaiFENesin (ROBAFEN) 100 MG/5ML SYRP Take 10 mLs by mouth 2 times daily as needed for cough 473 mL 97     ipratropium-albuterol (COMBIVENT RESPIMAT)  MCG/ACT inhaler Inhale 2 puffs into the lungs daily       levETIRAcetam (KEPPRA) 250 MG tablet TAKE 1 TABLET BY MOUTH ONCE DAILY 30 tablet PRN     melatonin 3 MG tablet TAKE 1 TABLET BY MOUTH ONCE DAILY 30 tablet PRN     Nutritional Supplements (ENSURE PLUS) LIQD Take by mouth 2 times daily       polyethylene glycol (MIRALAX) 17 GM/Dose powder MIX 17GM OF POWDER IN 8OZ OF WATER UNTIL COMPLETELY DISSOLVED. DRINK SOLUTION BY MOUTH ONCE DAILY 510 g 97     predniSONE (DELTASONE) 5 MG tablet Take 1.5 tablets (7.5 mg) by mouth daily       Sennosides (EQ NATURAL VEGETABLE LAXATIVE PO) Take 8.6 mg by mouth 2 times daily as needed        sennosides (SENOKOT) 8.6 MG tablet TAKE 1 TABLET BY MOUTH TWICE DAILY AS NEEDED FOR CONSTIPATION 30 tablet 97     White Petrolatum-Mineral Oil (SYSTANE NIGHTTIME) OINT APPLY A THIN RIBBON TO RIGHT LOWER LID TWICE DAILY 3.5 g 97     REVIEW OF SYSTEMS:  Limited secondary to cognitive impairment     Objective:  /72   Pulse 74   Temp 97.4  F (36.3  C)   Resp 18   Ht 1.626 m (5' 4\")   Wt 64.9 kg (143 lb)   SpO2 96%   BMI 24.55 kg/m    Exam:  GENERAL APPEARANCE:  Alert, in no distress, pleasant, cooperative  RESP: no respiratory distress, lung sounds are clear, patient is on room air  CV: regular rate and rhythm. Edema none in bilateral lower " extremities.  M/S:   Gait and station: ambulates with walker, able to move all extremities   SKIN:  Inspection and palpation of skin and subcutaneous tissue: skin warm, dry and intact without rashes. Has scattered bruising to the lower extremities.   NEURO: no facial asymmetry, no speech deficits and able to follow directions, moves all extremities symmetrically  PSYCH:  insight, judgement, and memory impaired, affect and mood normal    Labs:   CBC RESULTS:   Recent Labs   Lab Test 01/26/21 06/11/19   WBC 8.5 8.9   RBC 5.27 5.03   HGB 16.0 14.3   HCT 48.6* 44.8   MCV 92 89   MCH 30.4 28.4   MCHC 32.9 31.9*   RDW 14.9* 14.6*    530*       Last Basic Metabolic Panel:  Recent Labs   Lab Test 01/26/21 06/11/19    140   POTASSIUM 4.1 3.6   CHLORIDE 105 104   DIANE 8.7 9.4   CO2 29 29   BUN 26 14   CR 0.97 0.71   * 114       TSH   Date Value Ref Range Status   01/26/2021 0.84 0.30 - 5.00 uIU/mL Final     ASSESSMENT/PLAN:  (J44.9) Chronic obstructive pulmonary disease, unspecified COPD type (H)  (primary encounter diagnosis)  Comment: no wheezing or s/sx of exacerbation. Previously using oxygen but now on room air with saturations of 93-97%. Was on hospice in the past but did not recertifyy and was discharged in November 2019.   Plan:   --robafen q12h PRN  --oxygen PRN  --prednisone 7.5 mg daily.     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 She is at her baseline today, no sadness noted. Encouraged her to eat in the dining room for meals but she declines. She has preferred to eat in her room for months.   Plan:  -- encourage more socialization and activities on the unit.   --lexapro 10 mg daily     (I10) Benign essential hypertension  (I48.0) Paroxysmal atrial fibrillation  Comment: had elevated SBPS up to 170s with home care yesterday but controlled in the 140s  today so no need to adjust meds at this time.   Plan:  --continue with amlodipine 2.5 mg daily  --Continue to monitor blood pressure and heart rate, adjust medications as needed.     (G30.1,  F02.80) Late onset Alzheimer's disease without behavioral disturbance (H)  Comment: chronic, stable. Requires assist with cares and ambulates independently with walker.   Plan: continue with memory care assisted living for assistance with cares, meals and medications.   --continue with escitalopram 10 mg daily.      (R13.10) Dysphagia, unspecified type  Comment: stable weights consistently around 135 lbs. She appears to be tolerating diet well.   Plan: continue with mechanical soft diet and thin liquids. Crush meds with applesauce.     Electronically signed by:  CYNDI Miramontes CNP

## 2021-03-02 NOTE — LETTER
"    3/2/2021        RE: Zoraida Olivarez  C/o Nava Bruce  [address] [facility] [address]        [facility]  [place] Medical Record Number:  [id number]  Place of Service where encounter took place[facility] [[id number]]  Chief Complaint   Patient presents with     RECHECK       HPI:    Zoraida Olivarez  is a 86 year old (4/11/1934), who is being seen today for an episodic care visit.  HPI information obtained from: facility chart records, facility staff, patient report and [facility] chart review.     Ms. Olivarez was visited today while resting in bed. States that she didn't sleep well last night, asked what was keeping her awake at night, \"I don't know.\" She denies pain or discomfort. States that she is ready for lunch but prefers to eat in her room. Her breakfast tray was on the table and it looks like Dylan ate about 40% of the meal.     Bridge message from nursing on 2/22/21:  \"Resident trying to get out standing by the door, stating trying to get downstairs. Writer informing resident she is on the first floor already, resident would not tell writer what she is trying to do on first floor. Writer and multiple staff trying to engage resident in activity, offering snack and coffee, trying to get to sit with other residents and unsuccessful. Resident currently sitting on chair and keeping an eye on door, waiting for it to open.\"  When her daughter was visiting her, she had her clothes packed and ready to leave the facility. She was weepy with the home care nurse on 3/1/21. She was calm and pleasant today, asking that I call and speak with her daughter, Nava.     Past Medical and Surgical History reviewed in Epic today.    MEDICATIONS:    Current Outpatient Medications   Medication Sig Dispense Refill     acetaminophen (TYLENOL) 325 MG tablet TAKE TWO TABLETS (650MG) BY MOUTH EVERY 6 HOURS AS NEEDED FOR PAIN RATED 1-4 60 " "tablet 97     amLODIPine (NORVASC) 2.5 MG tablet TAKE 1 TABLET BY MOUTH EVERY MORNING 28 tablet 97     ammonium lactate (AMLACTIN) 12 % external cream Apply topically daily as needed for dry skin       ASPIRIN LOW DOSE 81 MG chewable tablet CHEW AND SWALLOW ONE TABLET BY MOUTH ONCE DAILY 28 tablet 97     bisacodyl (DULCOLAX) 10 MG suppository Place 10 mg rectally daily as needed for constipation       escitalopram (LEXAPRO) 10 MG tablet TAKE 1 TABLET BY MOUTH EVERY MORNING 28 tablet PRN     estradiol (ESTRACE) 0.5 MG tablet TAKE ONE-HALF TABLET (0.25MG) BY MOUTH ONCE DAILY 14 tablet 97     guaiFENesin (ROBAFEN) 100 MG/5ML SYRP Take 10 mLs by mouth 2 times daily as needed for cough 473 mL 97     ipratropium-albuterol (COMBIVENT RESPIMAT)  MCG/ACT inhaler Inhale 2 puffs into the lungs daily       levETIRAcetam (KEPPRA) 250 MG tablet TAKE 1 TABLET BY MOUTH ONCE DAILY 30 tablet PRN     melatonin 3 MG tablet TAKE 1 TABLET BY MOUTH ONCE DAILY 30 tablet PRN     Nutritional Supplements (ENSURE PLUS) LIQD Take by mouth 2 times daily       polyethylene glycol (MIRALAX) 17 GM/Dose powder MIX 17GM OF POWDER IN 8OZ OF WATER UNTIL COMPLETELY DISSOLVED. DRINK SOLUTION BY MOUTH ONCE DAILY 510 g 97     predniSONE (DELTASONE) 5 MG tablet Take 1.5 tablets (7.5 mg) by mouth daily       Sennosides (EQ NATURAL VEGETABLE LAXATIVE PO) Take 8.6 mg by mouth 2 times daily as needed        sennosides (SENOKOT) 8.6 MG tablet TAKE 1 TABLET BY MOUTH TWICE DAILY AS NEEDED FOR CONSTIPATION 30 tablet 97     White Petrolatum-Mineral Oil (SYSTANE NIGHTTIME) OINT APPLY A THIN RIBBON TO RIGHT LOWER LID TWICE DAILY 3.5 g 97     REVIEW OF SYSTEMS:  Limited secondary to cognitive impairment     Objective:  /72   Pulse 74   Temp 97.4  F (36.3  C)   Resp 18   Ht 1.626 m (5' 4\")   Wt 64.9 kg (143 lb)   SpO2 96%   BMI 24.55 kg/m    Exam:  GENERAL APPEARANCE:  Alert, in no distress, pleasant, cooperative  RESP: no respiratory distress, lung " sounds are clear, patient is on room air  CV: regular rate and rhythm. Edema none in bilateral lower extremities.  M/S:   Gait and station: ambulates with walker, able to move all extremities   SKIN:  Inspection and palpation of skin and subcutaneous tissue: skin warm, dry and intact without rashes. Has scattered bruising to the lower extremities.   NEURO: no facial asymmetry, no speech deficits and able to follow directions, moves all extremities symmetrically  PSYCH:  insight, judgement, and memory impaired, affect and mood normal    Labs:   CBC RESULTS:   Recent Labs   Lab Test 01/26/21 06/11/19   WBC 8.5 8.9   RBC 5.27 5.03   HGB 16.0 14.3   HCT 48.6* 44.8   MCV 92 89   MCH 30.4 28.4   MCHC 32.9 31.9*   RDW 14.9* 14.6*    530*       Last Basic Metabolic Panel:  Recent Labs   Lab Test 01/26/21 06/11/19    140   POTASSIUM 4.1 3.6   CHLORIDE 105 104   DIANE 8.7 9.4   CO2 29 29   BUN 26 14   CR 0.97 0.71   * 114       TSH   Date Value Ref Range Status   01/26/2021 0.84 0.30 - 5.00 uIU/mL Final     ASSESSMENT/PLAN:  (J44.9) Chronic obstructive pulmonary disease, unspecified COPD type (H)  (primary encounter diagnosis)  Comment: no wheezing or s/sx of exacerbation. Previously using oxygen but now on room air with saturations of 93-97%. Was on hospice in the past but did not recertifyy and was discharged in November 2019.   Plan:   --robafen q12h PRN  --oxygen PRN  --prednisone 7.5 mg daily.     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 She is at her baseline today, no sadness noted. Encouraged her to eat in the dining room for meals but she declines. She has preferred to eat in her room for months.   Plan:  -- encourage more socialization and activities on the unit.   --lexapro 10 mg daily     (I10) Benign essential hypertension  (I48.0) Paroxysmal atrial  fibrillation  Comment: had elevated SBPS up to 170s with home care yesterday but controlled in the 140s today so no need to adjust meds at this time.   Plan:  --continue with amlodipine 2.5 mg daily  --Continue to monitor blood pressure and heart rate, adjust medications as needed.     (G30.1,  F02.80) Late onset Alzheimer's disease without behavioral disturbance (H)  Comment: chronic, stable. Requires assist with cares and ambulates independently with walker.   Plan: continue with memory care assisted living for assistance with cares, meals and medications.   --continue with escitalopram 10 mg daily.      (R13.10) Dysphagia, unspecified type  Comment: stable weights consistently around 135 lbs. She appears to be tolerating diet well.   Plan: continue with mechanical soft diet and thin liquids. Crush meds with applesauce.     Electronically signed by:  CYNDI Miramontes CNP                 Sincerely,        CYNDI Miramontes CNP

## 2021-03-28 ENCOUNTER — HEALTH MAINTENANCE LETTER (OUTPATIENT)
Age: 86
End: 2021-03-28

## 2021-04-09 ENCOUNTER — RECORDS - HEALTHEAST (OUTPATIENT)
Dept: LAB | Facility: CLINIC | Age: 86
End: 2021-04-09

## 2021-04-13 ENCOUNTER — ASSISTED LIVING VISIT (OUTPATIENT)
Dept: GERIATRICS | Facility: CLINIC | Age: 86
End: 2021-04-13
Payer: MEDICARE

## 2021-04-13 VITALS
HEART RATE: 87 BPM | BODY MASS INDEX: 25.2 KG/M2 | RESPIRATION RATE: 26 BRPM | DIASTOLIC BLOOD PRESSURE: 86 MMHG | TEMPERATURE: 97.4 F | OXYGEN SATURATION: 96 % | WEIGHT: 147.6 LBS | SYSTOLIC BLOOD PRESSURE: 131 MMHG | HEIGHT: 64 IN

## 2021-04-13 DIAGNOSIS — I48.0 PAF (PAROXYSMAL ATRIAL FIBRILLATION) (H): ICD-10-CM

## 2021-04-13 DIAGNOSIS — R13.10 DYSPHAGIA, UNSPECIFIED TYPE: ICD-10-CM

## 2021-04-13 DIAGNOSIS — G30.1 LATE ONSET ALZHEIMER'S DISEASE WITHOUT BEHAVIORAL DISTURBANCE (H): ICD-10-CM

## 2021-04-13 DIAGNOSIS — I10 BENIGN ESSENTIAL HYPERTENSION: ICD-10-CM

## 2021-04-13 DIAGNOSIS — I73.9 PVD (PERIPHERAL VASCULAR DISEASE) (H): Primary | ICD-10-CM

## 2021-04-13 DIAGNOSIS — J44.9 CHRONIC OBSTRUCTIVE PULMONARY DISEASE, UNSPECIFIED COPD TYPE (H): ICD-10-CM

## 2021-04-13 DIAGNOSIS — F02.80 LATE ONSET ALZHEIMER'S DISEASE WITHOUT BEHAVIORAL DISTURBANCE (H): ICD-10-CM

## 2021-04-13 ASSESSMENT — MIFFLIN-ST. JEOR: SCORE: 1089.51

## 2021-04-13 NOTE — PROGRESS NOTES
[place] [facility]  [place] Medical Record Number:  [id number]  Place of Service where encounter took place:  [facility] ([facility]) [[id number]]  Chief Complaint   Patient presents with     RECHECK       HPI:    Zoraida Olivarez  is a 86 year old (4/11/1934), who is being seen today for an episodic care visit.  HPI information obtained from: facility chart records, facility staff, patient report and [facility] chart review.     Ms. Olivarez was visited today while resting in bed. She feels tired today, has been sleeping well at night. Received a message from daughter, Nava regarding a scabbed area on the left lower extremity. Dylan is aware of the area but is unsure of how it happened. It is not painful. Denies shortness of breath or wheezing.     Past Medical and Surgical History reviewed in Epic today.    MEDICATIONS:    Current Outpatient Medications   Medication Sig Dispense Refill     acetaminophen (TYLENOL) 325 MG tablet TAKE TWO TABLETS (650MG) BY MOUTH EVERY 6 HOURS AS NEEDED FOR PAIN RATED 1-4 60 tablet 97     amLODIPine (NORVASC) 2.5 MG tablet TAKE 1 TABLET BY MOUTH EVERY MORNING 28 tablet 97     ammonium lactate (AMLACTIN) 12 % external cream Apply topically daily as needed for dry skin       ASPIRIN LOW DOSE 81 MG chewable tablet CHEW AND SWALLOW ONE TABLET BY MOUTH ONCE DAILY 28 tablet 97     bisacodyl (DULCOLAX) 10 MG suppository Place 10 mg rectally daily as needed for constipation       escitalopram (LEXAPRO) 10 MG tablet TAKE 1 TABLET BY MOUTH EVERY MORNING 28 tablet PRN     estradiol (ESTRACE) 0.5 MG tablet TAKE ONE-HALF TABLET (0.25MG) BY MOUTH ONCE DAILY 14 tablet 97     guaiFENesin (ROBAFEN) 100 MG/5ML SYRP Take 10 mLs by mouth 2 times daily as needed for cough 473 mL 97     ipratropium-albuterol (COMBIVENT RESPIMAT)  MCG/ACT inhaler Inhale 2 puffs into the lungs daily       levETIRAcetam (KEPPRA) 250 MG tablet TAKE 1 TABLET BY MOUTH ONCE  "DAILY 30 tablet PRN     melatonin 3 MG tablet TAKE 1 TABLET BY MOUTH ONCE DAILY 30 tablet PRN     Nutritional Supplements (ENSURE PLUS) LIQD Take by mouth 2 times daily       polyethylene glycol (MIRALAX) 17 GM/Dose powder MIX 17GM OF POWDER IN 8OZ OF WATER UNTIL COMPLETELY DISSOLVED. DRINK SOLUTION BY MOUTH ONCE DAILY 510 g 97     predniSONE (DELTASONE) 5 MG tablet Take 1.5 tablets (7.5 mg) by mouth daily       Sennosides (EQ NATURAL VEGETABLE LAXATIVE PO) Take 8.6 mg by mouth 2 times daily as needed        sennosides (SENOKOT) 8.6 MG tablet TAKE 1 TABLET BY MOUTH TWICE DAILY AS NEEDED FOR CONSTIPATION 30 tablet 97     White Petrolatum-Mineral Oil (SYSTANE NIGHTTIME) OINT APPLY A THIN RIBBON TO RIGHT LOWER LID TWICE DAILY 3.5 g 97     REVIEW OF SYSTEMS:  Limited secondary to cognitive impairment     Objective:  /86   Pulse 87   Temp 97.4  F (36.3  C)   Resp 26   Ht 1.626 m (5' 4\")   Wt 67 kg (147 lb 9.6 oz)   SpO2 96%   BMI 25.34 kg/m    Exam:  GENERAL APPEARANCE:  Alert, in no distress, pleasant, cooperative  RESP: no respiratory distress, lung sounds are clear, patient is on room air  CV: regular rate and rhythm. Edema none in bilateral lower extremities.  M/S:   Gait and station: ambulates with walker, able to move all extremities   SKIN:  Inspection and palpation of skin and subcutaneous tissue: skin warm, dry. Anterior left shin with quarter dollar sized eschar. No surrounding redness or drainage.   NEURO: no facial asymmetry, no speech deficits and able to follow directions, moves all extremities symmetrically  PSYCH:  insight, judgement, and memory impaired, affect and mood normal    Labs:   CBC RESULTS:   Recent Labs   Lab Test 01/26/21 06/11/19   WBC 8.5 8.9   RBC 5.27 5.03   HGB 16.0 14.3   HCT 48.6* 44.8   MCV 92 89   MCH 30.4 28.4   MCHC 32.9 31.9*   RDW 14.9* 14.6*    530*       Last Basic Metabolic Panel:  Recent Labs   Lab Test 01/26/21 06/11/19    140   POTASSIUM 4.1 3.6 "   CHLORIDE 105 104   DIANE 8.7 9.4   CO2 29 29   BUN 26 14   CR 0.97 0.71   * 114       TSH   Date Value Ref Range Status   01/26/2021 0.84 0.30 - 5.00 uIU/mL Final     ASSESSMENT/PLAN:  (P173.9) Peripheral Vascular Disease  Comment: with large scabbed area on LLE. She has had this in the past and home care treated. Spoke with home care today and the plan is to hold off on ordering home care for now, leave the area protected and if the area opens, will then consult home care.   --wrap the area with kerlix then cover both legs with dermasaver shin tubes (family will have to purchase) to protect from further wounds and skin tears.     (J44.9) Chronic obstructive pulmonary disease, unspecified COPD type (H)   Comment: no wheezing or s/sx of exacerbation. Previously using oxygen but now on room air with saturations of 93-97%. Was on hospice in the past but did not recertify and was discharged in November 2019.   Plan:   --robafen q12h PRN  --oxygen PRN  --prednisone 7.5 mg daily.      (I10) Benign essential hypertension  (I48.0) Paroxysmal atrial fibrillation  Comment: controlled BP and HR   Plan:  --continue with amlodipine 2.5 mg daily  --Continue to monitor blood pressure and heart rate, adjust medications as needed.     (G30.1,  F02.80) Late onset Alzheimer's disease without behavioral disturbance (H)  Comment: chronic, stable. Requires assist with cares and ambulates independently with walker.   Plan: continue with memory care assisted living for assistance with cares, meals and medications.   --continue with escitalopram 10 mg daily.      (R13.10) Dysphagia, unspecified type  Comment: stable weights consistently around 135 lbs. She appears to be tolerating diet well.   Plan: continue with mechanical soft diet and thin liquids. Crush meds with applesauce.     Electronically signed by:  CYNDI Miramontes CNP

## 2021-04-13 NOTE — LETTER
4/13/2021        RE: Zoraida Olivarez  C/o Nava Bruce  [address] [facility] [address]        [facility] [facility]  [place] Medical Record Number:  [id number]  Place of Service where encounter took place:  [facility] ([facility]) [[id number]]  Chief Complaint   Patient presents with     RECHECK       HPI:    Zoraida Olivarez  is a 86 year old (4/11/1934), who is being seen today for an episodic care visit.  HPI information obtained from: facility chart records, facility staff, patient report and [facility] chart review.     Ms. Olivarez was visited today while resting in bed. She feels tired today, has been sleeping well at night. Received a message from daughter, Nava regarding a scabbed area on the left lower extremity. Dylan is aware of the area but is unsure of how it happened. It is not painful. Denies shortness of breath or wheezing.     Past Medical and Surgical History reviewed in Epic today.    MEDICATIONS:    Current Outpatient Medications   Medication Sig Dispense Refill     acetaminophen (TYLENOL) 325 MG tablet TAKE TWO TABLETS (650MG) BY MOUTH EVERY 6 HOURS AS NEEDED FOR PAIN RATED 1-4 60 tablet 97     amLODIPine (NORVASC) 2.5 MG tablet TAKE 1 TABLET BY MOUTH EVERY MORNING 28 tablet 97     ammonium lactate (AMLACTIN) 12 % external cream Apply topically daily as needed for dry skin       ASPIRIN LOW DOSE 81 MG chewable tablet CHEW AND SWALLOW ONE TABLET BY MOUTH ONCE DAILY 28 tablet 97     bisacodyl (DULCOLAX) 10 MG suppository Place 10 mg rectally daily as needed for constipation       escitalopram (LEXAPRO) 10 MG tablet TAKE 1 TABLET BY MOUTH EVERY MORNING 28 tablet PRN     estradiol (ESTRACE) 0.5 MG tablet TAKE ONE-HALF TABLET (0.25MG) BY MOUTH ONCE DAILY 14 tablet 97     guaiFENesin (ROBAFEN) 100 MG/5ML SYRP Take 10 mLs by mouth 2 times daily as needed for cough 473 mL 97     ipratropium-albuterol (COMBIVENT RESPIMAT)  MCG/ACT  "inhaler Inhale 2 puffs into the lungs daily       levETIRAcetam (KEPPRA) 250 MG tablet TAKE 1 TABLET BY MOUTH ONCE DAILY 30 tablet PRN     melatonin 3 MG tablet TAKE 1 TABLET BY MOUTH ONCE DAILY 30 tablet PRN     Nutritional Supplements (ENSURE PLUS) LIQD Take by mouth 2 times daily       polyethylene glycol (MIRALAX) 17 GM/Dose powder MIX 17GM OF POWDER IN 8OZ OF WATER UNTIL COMPLETELY DISSOLVED. DRINK SOLUTION BY MOUTH ONCE DAILY 510 g 97     predniSONE (DELTASONE) 5 MG tablet Take 1.5 tablets (7.5 mg) by mouth daily       Sennosides (EQ NATURAL VEGETABLE LAXATIVE PO) Take 8.6 mg by mouth 2 times daily as needed        sennosides (SENOKOT) 8.6 MG tablet TAKE 1 TABLET BY MOUTH TWICE DAILY AS NEEDED FOR CONSTIPATION 30 tablet 97     White Petrolatum-Mineral Oil (SYSTANE NIGHTTIME) OINT APPLY A THIN RIBBON TO RIGHT LOWER LID TWICE DAILY 3.5 g 97     REVIEW OF SYSTEMS:  Limited secondary to cognitive impairment     Objective:  /86   Pulse 87   Temp 97.4  F (36.3  C)   Resp 26   Ht 1.626 m (5' 4\")   Wt 67 kg (147 lb 9.6 oz)   SpO2 96%   BMI 25.34 kg/m    Exam:  GENERAL APPEARANCE:  Alert, in no distress, pleasant, cooperative  RESP: no respiratory distress, lung sounds are clear, patient is on room air  CV: regular rate and rhythm. Edema none in bilateral lower extremities.  M/S:   Gait and station: ambulates with walker, able to move all extremities   SKIN:  Inspection and palpation of skin and subcutaneous tissue: skin warm, dry. Anterior left shin with quarter dollar sized eschar. No surrounding redness or drainage.   NEURO: no facial asymmetry, no speech deficits and able to follow directions, moves all extremities symmetrically  PSYCH:  insight, judgement, and memory impaired, affect and mood normal    Labs:   CBC RESULTS:   Recent Labs   Lab Test 01/26/21 06/11/19   WBC 8.5 8.9   RBC 5.27 5.03   HGB 16.0 14.3   HCT 48.6* 44.8   MCV 92 89   MCH 30.4 28.4   MCHC 32.9 31.9*   RDW 14.9* 14.6*    " 530*       Last Basic Metabolic Panel:  Recent Labs   Lab Test 01/26/21 06/11/19    140   POTASSIUM 4.1 3.6   CHLORIDE 105 104   DIANE 8.7 9.4   CO2 29 29   BUN 26 14   CR 0.97 0.71   * 114       TSH   Date Value Ref Range Status   01/26/2021 0.84 0.30 - 5.00 uIU/mL Final     ASSESSMENT/PLAN:  (P173.9) Peripheral Vascular Disease  Comment: with large scabbed area on LLE. She has had this in the past and home care treated. Spoke with home care today and the plan is to hold off on ordering home care for now, leave the area protected and if the area opens, will then consult home care.   --wrap the area with kerlix then cover both legs with dermasaver shin tubes (family will have to purchase) to protect from further wounds and skin tears.     (J44.9) Chronic obstructive pulmonary disease, unspecified COPD type (H)   Comment: no wheezing or s/sx of exacerbation. Previously using oxygen but now on room air with saturations of 93-97%. Was on hospice in the past but did not recertify and was discharged in November 2019.   Plan:   --robafen q12h PRN  --oxygen PRN  --prednisone 7.5 mg daily.      (I10) Benign essential hypertension  (I48.0) Paroxysmal atrial fibrillation  Comment: controlled BP and HR   Plan:  --continue with amlodipine 2.5 mg daily  --Continue to monitor blood pressure and heart rate, adjust medications as needed.     (G30.1,  F02.80) Late onset Alzheimer's disease without behavioral disturbance (H)  Comment: chronic, stable. Requires assist with cares and ambulates independently with walker.   Plan: continue with memory care assisted living for assistance with cares, meals and medications.   --continue with escitalopram 10 mg daily.      (R13.10) Dysphagia, unspecified type  Comment: stable weights consistently around 135 lbs. She appears to be tolerating diet well.   Plan: continue with mechanical soft diet and thin liquids. Crush meds with applesauce.     Electronically signed by:  Lynn KINSEY  Elliott, CYNDI DELAROSA                       Sincerely,        CYNDI Miramontes CNP

## 2021-05-09 ENCOUNTER — RECORDS - HEALTHEAST (OUTPATIENT)
Dept: LAB | Facility: CLINIC | Age: 86
End: 2021-05-09

## 2021-05-14 ENCOUNTER — RECORDS - HEALTHEAST (OUTPATIENT)
Dept: LAB | Facility: CLINIC | Age: 86
End: 2021-05-14

## 2021-05-20 ENCOUNTER — RECORDS - HEALTHEAST (OUTPATIENT)
Dept: LAB | Facility: CLINIC | Age: 86
End: 2021-05-20

## 2021-06-23 NOTE — PROGRESS NOTES
[facility]  [place] Medical Record Number: [id number]  Place of Service where encounter took place: [facility] [[id number]]  Chief Complaint   Patient presents with     RECHECK       HPI:    Zoraida Olivarez is a 86 year old (4/11/1934), who is being seen today for an episodic care visit. HPI information obtained from: facility chart records, facility staff, patient report and [facility] chart review.    Notified by staff last week that Dylan had a hematoma like area on her LLE. She did not recall trauma to the area.     Ms. Olivarez was visited today while in her bed. She has some discomfort to the area when it is touched but it is okay while she is lying in the bed. She feels tired today. Ate breakfast today but in her pajamas, back in bed now. Not feeling hungry for lunch today. Does not note a difference in her breathing since she started the inhaler last week.     Past Medical and Surgical History reviewed in Epic today.    MEDICATIONS:  Current Outpatient Medications   Medication Sig Dispense Refill     ipratropium-albuterol (COMBIVENT RESPIMAT)  MCG/ACT inhaler Inhale 2 puffs into the lungs daily       predniSONE (DELTASONE) 5 MG tablet Take 1.5 tablets (7.5 mg) by mouth daily       acetaminophen (TYLENOL) 325 MG tablet TAKE TWO TABLETS (650MG) BY MOUTH EVERY 6 HOURS AS NEEDED FOR PAIN RATED 1-4 60 tablet 97     amLODIPine (NORVASC) 2.5 MG tablet TAKE 1 TABLET BY MOUTH EVERY MORNING 28 tablet 97     ammonium lactate (AMLACTIN) 12 % external cream Apply topically daily as needed for dry skin       ASPIRIN LOW DOSE 81 MG chewable tablet CHEW AND SWALLOW ONE TABLET BY MOUTH ONCE DAILY 28 tablet 97     bisacodyl (DULCOLAX) 10 MG suppository Place 10 mg rectally daily as needed for constipation       escitalopram (LEXAPRO) 10 MG tablet TAKE 1 TABLET BY MOUTH EVERY MORNING 28 tablet PRN     estradiol (ESTRACE) 0.5 MG tablet TAKE ONE-HALF  "TABLET (0.25MG) BY MOUTH ONCE DAILY 14 tablet 97     guaiFENesin (ROBAFEN) 100 MG/5ML SYRP Take 10 mLs by mouth 2 times daily as needed for cough 473 mL 97     levETIRAcetam (KEPPRA) 250 MG tablet TAKE 1 TABLET BY MOUTH ONCE DAILY 30 tablet PRN     melatonin 3 MG tablet TAKE 1 TABLET BY MOUTH ONCE DAILY 30 tablet PRN     Nutritional Supplements (ENSURE PLUS) LIQD Take by mouth 2 times daily       polyethylene glycol (MIRALAX) 17 GM/Dose powder MIX 17GM OF POWDER IN 8OZ OF WATER UNTIL COMPLETELY DISSOLVED. DRINK SOLUTION BY MOUTH ONCE DAILY 510 g 97     Sennosides (EQ NATURAL VEGETABLE LAXATIVE PO) Take 8.6 mg by mouth 2 times daily as needed        sennosides (SENOKOT) 8.6 MG tablet TAKE 1 TABLET BY MOUTH TWICE DAILY AS NEEDED FOR CONSTIPATION 30 tablet 97     White Petrolatum-Mineral Oil (SYSTANE NIGHTTIME) OINT APPLY A THIN RIBBON TO RIGHT LOWER LID TWICE DAILY 3.5 g 97     REVIEW OF SYSTEMS:  Limited secondary to cognitive impairment    Objective:  /71   Pulse 76   Temp 97.3  F (36.3  C)   Resp 18   Ht 1.626 m (5' 4\")   Wt 66.2 kg (146 lb)   SpO2 94%   BMI 25.06 kg/m    Exam:  GENERAL APPEARANCE:  Alert, in no distress, pleasant, cooperative, resting in bed  EYES: no discharge or mattering on lids or lashes noted  ENT:  moist mucous membranes, hearing acuity intact  NECK: supple, symmetrical  RESP: no respiratory distress, Lung sounds clear, patient is on room air  CV:  rate and rhythm regular. Edema none in bilateral lower extremities.   ABDOMEN: normal bowel sounds, soft, nontender.  M/S:   Gait and station: ambulates independently with walker, no tenderness or swelling of the joints; able to move all extremities   SKIN:  Inspection and palpation of skin and subcutaneous tissue: quarter dollar sized blood blister on the lateral aspect of the left lower extremity  NEURO: no facial asymmetry, no speech deficits and able to follow directions, moves all extremities symmetrically  PSYCH:  insight, " judgement, and memory impaired affect and mood flat    Labs:   CBC RESULTS:   Recent Labs   Lab Test 01/26/21 06/11/19   WBC 8.5 8.9   RBC 5.27 5.03   HGB 16.0 14.3   HCT 48.6* 44.8   MCV 92 89   MCH 30.4 28.4   MCHC 32.9 31.9*   RDW 14.9* 14.6*    530*       Last Basic Metabolic Panel:  Recent Labs   Lab Test 01/26/21 06/11/19    140   POTASSIUM 4.1 3.6   CHLORIDE 105 104   DIANE 8.7 9.4   CO2 29 29   BUN 26 14   CR 0.97 0.71   * 114       TSH   Date Value Ref Range Status   01/26/2021 0.84 0.30 - 5.00 uIU/mL Final       ASSESSMENT/PLAN:  (D72.9) White blood cell abnormality  (primary encounter diagnosis)  Comment: immature granulocyte 1 and immature granulocyte absolute count 0.1. spent >30 minutes consulting with Dr. Robles and discussing plan with daughter, aNva. Looking back on prior labs, she has had an elevated granulocyte count since 2019 but that was a level of 0.6 so it is higher now. Possible causes could be due to recent viral infection vs oral prednisone use. Also may be polycythemia vera (has high hgb and has had elevated platelets in the past). We discussed that further work up would include a bone marrow biopsy and if polycythemia, the treatment options. At this time, Nava is not interested in further work up or treatment.   Plan: will decrease prednisone to 7.5 mg daily and recheck CBC with diff in 3 months if family would like to know the count after changing the prednisone dose.     (J44.9) Chronic obstructive pulmonary disease, unspecified COPD type (H)  Comment: started on inhaler last week in hopes it would help with the dyspnea with exertion but Dylan does not note a change in her breathing since starting.   Plan:   --continue with combivent respimat 2 puffs daily  --prednisone 7.5 mg daily    (T14.8XXA) Blood blister  Comment: unknown etiology but likely bumped her leg on something as she was ambulating.   Plan: prevent the blister from opening up if possible  --will ask staff to  cover with a light cover such as tubi  or kerlix.   --can use ice if needed.  --if blister opens up, may need to have home care RN jane.     Electronically signed by:  CYNDI Miramontes CNP          done done done

## 2021-07-14 ENCOUNTER — ASSISTED LIVING VISIT (OUTPATIENT)
Dept: GERIATRICS | Facility: CLINIC | Age: 86
End: 2021-07-14
Payer: MEDICARE

## 2021-07-14 VITALS
BODY MASS INDEX: 23.31 KG/M2 | SYSTOLIC BLOOD PRESSURE: 175 MMHG | HEART RATE: 71 BPM | DIASTOLIC BLOOD PRESSURE: 102 MMHG | WEIGHT: 135.8 LBS | OXYGEN SATURATION: 95 % | RESPIRATION RATE: 18 BRPM | TEMPERATURE: 97.5 F

## 2021-07-14 DIAGNOSIS — I10 BENIGN ESSENTIAL HYPERTENSION: ICD-10-CM

## 2021-07-14 DIAGNOSIS — J44.9 CHRONIC OBSTRUCTIVE PULMONARY DISEASE, UNSPECIFIED COPD TYPE (H): ICD-10-CM

## 2021-07-14 DIAGNOSIS — H57.89 SCLERAL INJECTION: ICD-10-CM

## 2021-07-14 DIAGNOSIS — I48.0 PAF (PAROXYSMAL ATRIAL FIBRILLATION) (H): ICD-10-CM

## 2021-07-14 DIAGNOSIS — G30.1 LATE ONSET ALZHEIMER'S DISEASE WITHOUT BEHAVIORAL DISTURBANCE (H): ICD-10-CM

## 2021-07-14 DIAGNOSIS — N18.31 STAGE 3A CHRONIC KIDNEY DISEASE (H): Primary | ICD-10-CM

## 2021-07-14 DIAGNOSIS — F02.80 LATE ONSET ALZHEIMER'S DISEASE WITHOUT BEHAVIORAL DISTURBANCE (H): ICD-10-CM

## 2021-07-14 DIAGNOSIS — I73.9 PVD (PERIPHERAL VASCULAR DISEASE) (H): ICD-10-CM

## 2021-07-14 NOTE — LETTER
"    7/14/2021        RE: Zoraida Olivarez  C/o N[address] Teo  [address] [facility] [address]        Zoraida Olivarez is a 87 year old female seen July 14, 2021 at [facility] unit where she has resided for >2 years (admit 2/2019) seen to follow up COPD with hypoxia and dementia.   Pt is seen on the unit up to chair, very worried about whether her tablemate has had her breakfast yet and is difficult to redirect off that topic.  History is scrambled and not reliable.     It is a positive change that she is out in the dining room, had been isolative in her room for some time.    She had an outbreak of shingles in January 2021     By chart review, patient was found down at home in February 2019 and hospitalized at [facility] for E coli UTI with sepsis, atrial fib with RVR and COPD exacerbation  She was treated with IVF and abx; VR control with metoprolol, but not anticoagulated given h/o brain aneurysm, falls and dementia.  She went to [facility], but did not improve enough to return to independent living, transferred to [facility] for permanent placement.    Pt was on Hospice for a while, but \"graduated\" in November 2019 secondary to stability.     Patient has followed with Dr Hightower, Neurology for decades after being diagnosed with a cerebral aneurysm in 1980.  She had a surgical repair and is on Keppra for sz prophylaxis.    No current headaches or neurologic changes.       Past Medical History:   Diagnosis Date     Cerebral aneurysm, 1980      Chronic obstructive lung disease (H), 2003      Hearing loss, sensorineural, combined types, 2009      History of shingles      Hyperlipidemia      Ileitis      Incomplete RBBB      Major depression      Memory loss      PAD (peripheral artery disease) (H), bilateral iliac stents 2007      Polyp of colon, adenomatous, 2008      Skin cancer of arm      Tobacco abuse      Tremor, essential, 2004      Past Surgical " History:   Procedure Laterality Date     APPENDECTOMY       CHOLECYSTECTOMY       HYSTERECTOMY       SH:  Lives in [facility] Memory Care unit.   Previously lived alone in her condo.      She has a daughter Nava and a son Amos.   Pt reports she grew up in [place].  Worked as a  at one point.      +smoker until admission; smoked for >60 years    ROS:  SLUMS 1/30   ACL 3.8 in TCU  Able to stand independently, ambulatory without device  PVD had LE edema and venous stasis ulcers, now healed.       EXAM: NAD  BP (!) 175/102   Pulse 71   Temp 97.5  F (36.4  C)   Resp 18   Wt 61.6 kg (135 lb 12.8 oz)   SpO2 95%   BMI 23.31 kg/m     Left eye is mild protuberant, mild scleral injection and tearing  Neck supple without adenopathy  Lungs with decreased BS, no rales or wheeze  Heart RRR s1s2  Abd soft, NT, no distention or guarding, +BS  Ext without edema  Neuro: limited nonsensical speech, bilateral hand intention tremor     Psych: affect anxious    Last Comprehensive Metabolic Panel:  Sodium   Date Value Ref Range Status   01/26/2021 141 136 - 145 mmol/L Final   01/26/2021 141 136 - 145 mmol/L Final     Potassium   Date Value Ref Range Status   01/26/2021 4.1 3.5 - 5.0 mmol/L Final   01/26/2021 4.1 3.5 - 5.0 mmol/L Final     Chloride   Date Value Ref Range Status   01/26/2021 105 98 - 107 mmol/L Final   01/26/2021 105 98 - 107 mmol/L Final     Carbon Dioxide   Date Value Ref Range Status   01/26/2021 29 22 - 31 mmol/L Final     Carbon Dioxide (CO2)   Date Value Ref Range Status   01/26/2021 29 22 - 31 mmol/L Final     Anion Gap   Date Value Ref Range Status   01/26/2021 7 5 - 18 mmol/L Final   01/26/2021 7 5 - 18 mmol/L Final     Glucose   Date Value Ref Range Status   01/26/2021 154 (H) 70 - 125 mg/dL Final   01/26/2021 154 (A) 70 - 125 mg/dL Final     Urea Nitrogen   Date Value Ref Range Status   01/26/2021 26 8 - 28 mg/dL Final   01/26/2021 26 8 - 28 mg/dL Final     Creatinine   Date Value Ref Range Status    01/26/2021 0.97 0.60 - 1.10 mg/dL Final   01/26/2021 0.97 0.60 - 1.10 mg/dL Final     GFR Estimate   Date Value Ref Range Status   01/26/2021 54 (L) >60 mL/min/1.73m2 Final   01/26/2021 54 (L) >60 ml/min/1.73m2 Final     Calcium   Date Value Ref Range Status   01/26/2021 8.7 8.5 - 10.5 mg/dL Final   01/26/2021 8.7 8.5 - 10.5 mg/dL Final     Lab Results   Component Value Date    WBC 8.5 01/26/2021      HGB 16.0 01/26/2021      MCV 92 01/26/2021       01/26/2021          IMP/PLAN:   (J44.9) Chronic obstructive pulmonary disease, unspecified COPD type (H)   Comment: past smoker but now >2 years since she last smoked  Plan:   Remains on prednisone 7.5 mg/day  Combivent respimat daily     (N18.31) Stage 3a chronic kidney disease   (I10) Benign essential hypertension  Comment: GFR 50s  BP Readings from Last 3 Encounters:   07/14/21 (!) 175/102   04/13/21 131/86   03/02/21 113/72      Plan: amlodipine 2.5 mg/day, follow and can increase dose if SBPs stay >160      (I48.0) PAF (paroxysmal atrial fibrillation) (H)  Comment:    Pulse Readings from Last 4 Encounters:   07/14/21 71   04/13/21 87   03/02/21 74   02/02/21 76      Plan: controlled VR without rate slowing medications; on ASA only as above       (I73.9) PVD (peripheral vascular disease) (H)  Comment: h/o LE ulcers    Plan: continue daily ASA, statin, monitor for open areas     (Z86.79) Hx of aneurysm  Comment: s/p repair   Plan: continue Keppra 250 mg/day for stroke prophylaxis  Follow up with Dr Hightower      (R13.10) Dysphagia, unspecified type  Comment: weight loss has stabilized   Plan: mechanical soft diet with thin liquids       (G30.1,  F02.80) Late onset Alzheimer's disease without behavioral disturbance  Comment: low functional status    Plan: [facility] Memory Care unit for assist with meds, meals, activity       (F43.21) Adjustment disorder with depressed mood  Comment: loss of autonomy, limited coping skills   Plan: continue escitalopram 10 mg/day and  melatonin 3 mg/HS for sleep       (H57.9) Scleral injection  Comment: mild changes on exam as above   Plan: Ophthalmology follow up if family is interested.       Meghan Robles MD         Sincerely,        Meghan Robles MD

## 2021-07-14 NOTE — Clinical Note
[facility]  Health Care Home  Patient Care Plan  About Me  Patient Name:  Zoraida Olivarez    YOB: 1934  Age:   87 year old   Britney MRN: [id number] Telephone Information:   Home Phone [phone]   Mobile [phone]       Address:    C[address] N[address] Bruce  [address] [facility] [address] Email address:  [email or web address]      Emergency Contact(s)  Name Relationship Lgl Grd Work Phone Home Phone Mobile Phone   1. ELIJAH* Daughter No [phone]   2. FER OLIVAREZ Son No none  [phone]   3. ELIJAH* Other  [phone]   4. FER OLIVAREZ Other    [phone]           Primary language:  English     needed?     [facility]:  [phone] op. 1  Other communication barriers:    Current living arrangement:    Mobility Status/ Medical Equipment:    Other information to know about me:      Health Maintenance  Immunizations:     Cancer Screening:       My Access Plan  Medical Emergency 911   Primary Clinic Line     24/7 After Hours Line [phone]   Preferred Hospital     Preferred Pharmacy [facility] - [place], [place] - [address] [place] [address]     Behavioral Health Crisis Line Crisis Connection, [phone] or 911     My Care Team Members  Patient Care Team       Relationship Specialty Notifications Start End    Lynn Gallagher APRN CNP PCP - General Nurse Practitioner - Gerontology All results, Admissions 3/13/19     Phone: [phone] Fax: [phone]         [address] [place] [address] [facility] [address]    Lynn Gallagher APRN CNP Assigned PCP   2/24/19     Phone: [phone] Fax: [phone]         [address] [facility] [address] [facility] [address]    Faustina Sainz MD MD Internal Medicine  3/13/19     Phone: [phone] Pager: [phone] Fax: [phone]        [address] [address]    Lisa Khan Nursing Assistant Gerontology All results, Admissions 5/6/19      Geriatric Services            My Care Plans  Self Management and Treatment Plan      Action Plans on File:    Advance Care Plans/Directives on file:                My Medical and Care Information  Problem List   Patient Active Problem List   Diagnosis    Metabolic encephalopathy    Urinary tract infection without hematuria    Chronic airway obstruction (H)    Acute hypokalemia    Chronic diarrhea    Atrial fibrillation with RVR (H)    Peripheral vascular disease (H)    Family history of tobacco abuse and dependence    Abnormal CT lung screening    Abnormal urinalysis    Benign neoplasm of colon    Cerebral aneurysm    Cognitive impairment    Dementia without behavioral disturbance (H)    Diarrhea    Essential tremor    History of fall    Hyperlipidemia    Impaired mobility and activities of daily living    Incomplete RBBB    Major depression in remission (H)    Pulmonary nodule    Sensorineural hearing loss, bilateral    Tobacco abuse    Volume depletion      Current Medications and Allergies:  See printed Medication Report.    Health Care Home Complexity Tier:      Care Coordination Start Date:     Form Last Updated: 07/14/2021

## 2021-07-20 PROBLEM — N18.30 CHRONIC KIDNEY DISEASE, STAGE 3 (H): Status: ACTIVE | Noted: 2021-07-20

## 2021-07-20 NOTE — PROGRESS NOTES
"Zoraida Olivarez is a 87 year old female seen July 14, 2021 at [facility] Memory Care unit where she has resided for >2 years (admit 2/2019) seen to follow up COPD with hypoxia and dementia.   Pt is seen on the unit up to chair, very worried about whether her tablemate has had her breakfast yet and is difficult to redirect off that topic.  History is scrambled and not reliable.     It is a positive change that she is out in the dining room, had been isolative in her room for some time.    She had an outbreak of shingles in January 2021     By chart review, patient was found down at home in February 2019 and hospitalized at [facility] for E coli UTI with sepsis, atrial fib with RVR and COPD exacerbation  She was treated with IVF and abx; VR control with metoprolol, but not anticoagulated given h/o brain aneurysm, falls and dementia.  She went to [facility] T[facility], but did not improve enough to return to independent living, transferred to AL for permanent placement.    Pt was on Hospice for a while, but \"graduated\" in November 2019 secondary to stability.     Patient has followed with Dr Hightower, Neurology for decades after being diagnosed with a cerebral aneurysm in 1980.  She had a surgical repair and is on Keppra for sz prophylaxis.    No current headaches or neurologic changes.       Past Medical History:   Diagnosis Date     Cerebral aneurysm, 1980      Chronic obstructive lung disease (H), 2003      Hearing loss, sensorineural, combined types, 2009      History of shingles      Hyperlipidemia      Ileitis      Incomplete RBBB      Major depression      Memory loss      PAD (peripheral artery disease) (H), bilateral iliac stents 2007      Polyp of colon, adenomatous, 2008      Skin cancer of arm      Tobacco abuse      Tremor, essential, 2004      Past Surgical History:   Procedure Laterality Date     APPENDECTOMY       CHOLECYSTECTOMY       HYSTERECTOMY       SH:  Lives in [facility] " Memory Care unit.   Previously lived alone in her condo.      She has a daughter Nava and a son Amos.   Pt reports she grew up in [place].  Worked as a  at one point.      +smoker until admission; smoked for >60 years    ROS:  SLUMS 1/30   ACL 3.8 in TCU  Able to stand independently, ambulatory without device  PVD had LE edema and venous stasis ulcers, now healed.       EXAM: NAD  BP (!) 175/102   Pulse 71   Temp 97.5  F (36.4  C)   Resp 18   Wt 61.6 kg (135 lb 12.8 oz)   SpO2 95%   BMI 23.31 kg/m     Left eye is mild protuberant, mild scleral injection and tearing  Neck supple without adenopathy  Lungs with decreased BS, no rales or wheeze  Heart RRR s1s2  Abd soft, NT, no distention or guarding, +BS  Ext without edema  Neuro: limited nonsensical speech, bilateral hand intention tremor     Psych: affect anxious    Last Comprehensive Metabolic Panel:  Sodium   Date Value Ref Range Status   01/26/2021 141 136 - 145 mmol/L Final   01/26/2021 141 136 - 145 mmol/L Final     Potassium   Date Value Ref Range Status   01/26/2021 4.1 3.5 - 5.0 mmol/L Final   01/26/2021 4.1 3.5 - 5.0 mmol/L Final     Chloride   Date Value Ref Range Status   01/26/2021 105 98 - 107 mmol/L Final   01/26/2021 105 98 - 107 mmol/L Final     Carbon Dioxide   Date Value Ref Range Status   01/26/2021 29 22 - 31 mmol/L Final     Carbon Dioxide (CO2)   Date Value Ref Range Status   01/26/2021 29 22 - 31 mmol/L Final     Anion Gap   Date Value Ref Range Status   01/26/2021 7 5 - 18 mmol/L Final   01/26/2021 7 5 - 18 mmol/L Final     Glucose   Date Value Ref Range Status   01/26/2021 154 (H) 70 - 125 mg/dL Final   01/26/2021 154 (A) 70 - 125 mg/dL Final     Urea Nitrogen   Date Value Ref Range Status   01/26/2021 26 8 - 28 mg/dL Final   01/26/2021 26 8 - 28 mg/dL Final     Creatinine   Date Value Ref Range Status   01/26/2021 0.97 0.60 - 1.10 mg/dL Final   01/26/2021 0.97 0.60 - 1.10 mg/dL Final     GFR Estimate   Date Value Ref Range  Status   01/26/2021 54 (L) >60 mL/min/1.73m2 Final   01/26/2021 54 (L) >60 ml/min/1.73m2 Final     Calcium   Date Value Ref Range Status   01/26/2021 8.7 8.5 - 10.5 mg/dL Final   01/26/2021 8.7 8.5 - 10.5 mg/dL Final     Lab Results   Component Value Date    WBC 8.5 01/26/2021      HGB 16.0 01/26/2021      MCV 92 01/26/2021       01/26/2021          IMP/PLAN:   (J44.9) Chronic obstructive pulmonary disease, unspecified COPD type (H)   Comment: past smoker but now >2 years since she last smoked  Plan:   Remains on prednisone 7.5 mg/day  Combivent respimat daily     (N18.31) Stage 3a chronic kidney disease   (I10) Benign essential hypertension  Comment: GFR 50s  BP Readings from Last 3 Encounters:   07/14/21 (!) 175/102   04/13/21 131/86   03/02/21 113/72      Plan: amlodipine 2.5 mg/day, follow and can increase dose if SBPs stay >160      (I48.0) PAF (paroxysmal atrial fibrillation) (H)  Comment:    Pulse Readings from Last 4 Encounters:   07/14/21 71   04/13/21 87   03/02/21 74   02/02/21 76      Plan: controlled VR without rate slowing medications; on ASA only as above       (I73.9) PVD (peripheral vascular disease) (H)  Comment: h/o LE ulcers    Plan: continue daily ASA, statin, monitor for open areas     (Z86.79) Hx of aneurysm  Comment: s/p repair   Plan: continue Keppra 250 mg/day for stroke prophylaxis  Follow up with Dr Hightower      (R13.10) Dysphagia, unspecified type  Comment: weight loss has stabilized   Plan: mechanical soft diet with thin liquids       (G30.1,  F02.80) Late onset Alzheimer's disease without behavioral disturbance  Comment: low functional status    Plan: [facility] Care unit for assist with meds, meals, activity       (F43.21) Adjustment disorder with depressed mood  Comment: loss of autonomy, limited coping skills   Plan: continue escitalopram 10 mg/day and melatonin 3 mg/HS for sleep       (H57.9) Scleral injection  Comment: mild changes on exam as above   Plan: Ophthalmology  follow up if family is interested.       Meghan Robles MD

## 2021-08-02 DIAGNOSIS — I10 BENIGN ESSENTIAL HYPERTENSION: Primary | ICD-10-CM

## 2021-08-03 ENCOUNTER — ASSISTED LIVING VISIT (OUTPATIENT)
Dept: GERIATRICS | Facility: CLINIC | Age: 86
End: 2021-08-03
Payer: MEDICARE

## 2021-08-03 VITALS
HEIGHT: 64 IN | OXYGEN SATURATION: 93 % | BODY MASS INDEX: 23.18 KG/M2 | WEIGHT: 135.8 LBS | HEART RATE: 66 BPM | TEMPERATURE: 97.6 F | DIASTOLIC BLOOD PRESSURE: 102 MMHG | SYSTOLIC BLOOD PRESSURE: 157 MMHG

## 2021-08-03 DIAGNOSIS — I10 BENIGN ESSENTIAL HYPERTENSION: Primary | ICD-10-CM

## 2021-08-03 DIAGNOSIS — J44.9 CHRONIC OBSTRUCTIVE PULMONARY DISEASE, UNSPECIFIED COPD TYPE (H): ICD-10-CM

## 2021-08-03 DIAGNOSIS — F02.80 LATE ONSET ALZHEIMER'S DISEASE WITHOUT BEHAVIORAL DISTURBANCE (H): ICD-10-CM

## 2021-08-03 DIAGNOSIS — G30.1 LATE ONSET ALZHEIMER'S DISEASE WITHOUT BEHAVIORAL DISTURBANCE (H): ICD-10-CM

## 2021-08-03 RX ORDER — AMLODIPINE BESYLATE 2.5 MG/1
5 TABLET ORAL EVERY MORNING
Qty: 28 TABLET | Refills: 97 | COMMUNITY
Start: 2021-08-03 | End: 2021-11-07

## 2021-08-03 RX ORDER — AMLODIPINE BESYLATE 5 MG/1
TABLET ORAL
Qty: 28 TABLET | Refills: 10 | Status: SHIPPED | OUTPATIENT
Start: 2021-08-03 | End: 2022-01-01

## 2021-08-03 ASSESSMENT — MIFFLIN-ST. JEOR: SCORE: 1035.98

## 2021-08-03 NOTE — LETTER
8/3/2021        RE: Zoraida Olivarez  C/o Nava Bruce  [address]  [facility] [address]        [facility]  [place] Medical Record Number:  [id number]  Place of Service where encounter took place:  [facility] ([facility]) [[id number]]  Chief Complaint   Patient presents with     RECHECK       HPI:    Zoraida Olivarez  is a 87 year old (4/11/1934), who is being seen today for an episodic care visit.  HPI information obtained from: facility chart records, facility staff, patient report and [facility] chart review.    Dylan was visited today while in her, napping on the couch. She just finished eating breakfast and feels tired. Denies pain or discomfort. She had an episode of attempting to exit seek on 7/24 but nothing again since that time. Staff reports that she has elevated blood pressures as noted below:    B/P 166/96 P 66 R 18 T 97.0.  B/P 153/85 P 68 R 18 T 96.9 O 2 sat 93 % RA.   7/8 /102  7/7 /99  6/24 /88    Past Medical and Surgical History reviewed in Epic today.    MEDICATIONS:    Current Outpatient Medications   Medication Sig Dispense Refill     amLODIPine (NORVASC) 2.5 MG tablet Take 2 tablets (5 mg) by mouth every morning 28 tablet 97     acetaminophen (TYLENOL) 325 MG tablet TAKE TWO TABLETS (650MG) BY MOUTH EVERY 6 HOURS AS NEEDED FOR PAIN RATED 1-4 60 tablet 97     ammonium lactate (AMLACTIN) 12 % external cream Apply topically daily as needed for dry skin       ASPIRIN LOW DOSE 81 MG chewable tablet CHEW AND SWALLOW ONE TABLET BY MOUTH ONCE DAILY 28 tablet 97     bisacodyl (DULCOLAX) 10 MG suppository Place 10 mg rectally daily as needed for constipation       escitalopram (LEXAPRO) 10 MG tablet TAKE 1 TABLET BY MOUTH EVERY MORNING 28 tablet PRN     estradiol (ESTRACE) 0.5 MG tablet TAKE ONE-HALF TABLET (0.25MG) BY MOUTH ONCE DAILY 14 tablet 97     guaiFENesin (ROBAFEN) 100 MG/5ML SYRP Take 10 mLs by mouth 2 times daily as  "needed for cough 473 mL 97     ipratropium-albuterol (COMBIVENT RESPIMAT)  MCG/ACT inhaler Inhale 2 puffs into the lungs daily       levETIRAcetam (KEPPRA) 250 MG tablet TAKE 1 TABLET BY MOUTH ONCE DAILY 30 tablet PRN     melatonin 3 MG tablet TAKE 1 TABLET BY MOUTH ONCE DAILY 30 tablet PRN     Nutritional Supplements (ENSURE PLUS) LIQD Take by mouth 2 times daily       polyethylene glycol (MIRALAX) 17 GM/Dose powder MIX 17GM OF POWDER IN 8OZ OF WATER UNTIL COMPLETELY DISSOLVED. DRINK SOLUTION BY MOUTH ONCE DAILY 510 g 97     predniSONE (DELTASONE) 5 MG tablet Take 1.5 tablets (7.5 mg) by mouth daily       Sennosides (EQ NATURAL VEGETABLE LAXATIVE PO) Take 8.6 mg by mouth 2 times daily as needed        sennosides (SENOKOT) 8.6 MG tablet TAKE 1 TABLET BY MOUTH TWICE DAILY AS NEEDED FOR CONSTIPATION 30 tablet 97     White Petrolatum-Mineral Oil (SYSTANE NIGHTTIME) OINT APPLY A THIN RIBBON TO RIGHT LOWER LID TWICE DAILY 3.5 g 97     REVIEW OF SYSTEMS:  Limited secondary to cognitive impairment but today pt reports as noted above    Objective:  BP (!) 157/102   Pulse 66   Temp 97.6  F (36.4  C)   Ht 1.626 m (5' 4\")   Wt 61.6 kg (135 lb 12.8 oz)   SpO2 93%   BMI 23.31 kg/m    Exam:  GENERAL APPEARANCE:  Alert, in no distress, pleasant, cooperative  RESP: no respiratory distress, lung sounds are clear, patient is on room air  CV: regular rate and rhythm. Edema none in bilateral lower extremities.  M/S:   Gait and station: ambulates with walker, able to move all extremities   SKIN:  Inspection and palpation of skin and subcutaneous tissue: skin warm, dry. No surrounding redness or drainage.   NEURO: no facial asymmetry, no speech deficits and able to follow directions, moves all extremities symmetrically  PSYCH:  insight, judgement, and memory impaired, affect and mood normal    Labs:   CBC RESULTS: Recent Labs   Lab Test 01/26/21  0600 01/26/21  0000   WBC 8.5 8.5   RBC 5.27 5.27   HGB 16.0 16.0   HCT 48.6* " 48.6*   MCV 92 92   MCH 30.4 30.4   MCHC 32.9 32.9   RDW 14.9* 14.9*    371       Last Basic Metabolic Panel:  Recent Labs   Lab Test 01/26/21  0600 01/26/21  0000    141   POTASSIUM 4.1 4.1   CHLORIDE 105 105   DIANE 8.7 8.7   CO2 29 29   BUN 26 26   CR 0.97 0.97   * 154*       TSH   Date Value Ref Range Status   01/26/2021 0.84 0.30 - 5.00 uIU/mL Final   01/26/2021 0.84 0.30 - 5.00 uIU/mL Final   03/21/2019 1.74 0.30 - 5.00 uIU/mL Final     ASSESSMENT/PLAN:   (I10) Benign essential hypertension  Comment: BP has been out of goal range with systolics above 150  Plan:  --amlodipine increased to 5 mg this week.   --Continue to monitor blood pressure and heart rate, adjust medications as needed.     (G30.1,  F02.80) Late onset Alzheimer's disease without behavioral disturbance (H)  Comment: chronic, stable. Requires assist with cares and ambulates independently with walker. she had an episode of exit seeking last week and nothing since that time so will monitor for now. She is calm and pleasant today so no need for adjusting meds today.  Plan: continue with memory care assisted living for assistance with cares, meals and medications.   --continue with escitalopram 10 mg daily.     (J44.9) Chronic obstructive pulmonary disease, unspecified COPD type (H)   Comment: no wheezing or s/sx of exacerbation. Previously using oxygen but now on room air with saturations of 93-97%. Was on hospice in the past but did not recertify and was discharged in November 2019.   Plan:   --oxygen PRN  --prednisone 7.5 mg daily.     Electronically signed by:  CYNDI Miramontes CNP                 Sincerely,        CYNDI Miramontes CNP

## 2021-08-03 NOTE — PROGRESS NOTES
[facility]  [place] Medical Record Number:  [id number]  Place of Service where encounter took place:  [facility] ([facility]) [[id number]]  Chief Complaint   Patient presents with     RECHECK       HPI:    Zoraida Olivarez  is a 87 year old (4/11/1934), who is being seen today for an episodic care visit.  HPI information obtained from: facility chart records, facility staff, patient report and [facility] chart review.    Dylan was visited today while in her, napping on the couch. She just finished eating breakfast and feels tired. Denies pain or discomfort. She had an episode of attempting to exit seek on 7/24 but nothing again since that time. Staff reports that she has elevated blood pressures as noted below:    B/P 166/96 P 66 R 18 T 97.0.  B/P 153/85 P 68 R 18 T 96.9 O 2 sat 93 % RA.   7/8 /102  7/7 /99  6/24 /88    Past Medical and Surgical History reviewed in Epic today.    MEDICATIONS:    Current Outpatient Medications   Medication Sig Dispense Refill     amLODIPine (NORVASC) 2.5 MG tablet Take 2 tablets (5 mg) by mouth every morning 28 tablet 97     acetaminophen (TYLENOL) 325 MG tablet TAKE TWO TABLETS (650MG) BY MOUTH EVERY 6 HOURS AS NEEDED FOR PAIN RATED 1-4 60 tablet 97     ammonium lactate (AMLACTIN) 12 % external cream Apply topically daily as needed for dry skin       ASPIRIN LOW DOSE 81 MG chewable tablet CHEW AND SWALLOW ONE TABLET BY MOUTH ONCE DAILY 28 tablet 97     bisacodyl (DULCOLAX) 10 MG suppository Place 10 mg rectally daily as needed for constipation       escitalopram (LEXAPRO) 10 MG tablet TAKE 1 TABLET BY MOUTH EVERY MORNING 28 tablet PRN     estradiol (ESTRACE) 0.5 MG tablet TAKE ONE-HALF TABLET (0.25MG) BY MOUTH ONCE DAILY 14 tablet 97     guaiFENesin (ROBAFEN) 100 MG/5ML SYRP Take 10 mLs by mouth 2 times daily as needed for cough 473 mL 97     ipratropium-albuterol (COMBIVENT RESPIMAT)  MCG/ACT inhaler Inhale 2 puffs into  "the lungs daily       levETIRAcetam (KEPPRA) 250 MG tablet TAKE 1 TABLET BY MOUTH ONCE DAILY 30 tablet PRN     melatonin 3 MG tablet TAKE 1 TABLET BY MOUTH ONCE DAILY 30 tablet PRN     Nutritional Supplements (ENSURE PLUS) LIQD Take by mouth 2 times daily       polyethylene glycol (MIRALAX) 17 GM/Dose powder MIX 17GM OF POWDER IN 8OZ OF WATER UNTIL COMPLETELY DISSOLVED. DRINK SOLUTION BY MOUTH ONCE DAILY 510 g 97     predniSONE (DELTASONE) 5 MG tablet Take 1.5 tablets (7.5 mg) by mouth daily       Sennosides (EQ NATURAL VEGETABLE LAXATIVE PO) Take 8.6 mg by mouth 2 times daily as needed        sennosides (SENOKOT) 8.6 MG tablet TAKE 1 TABLET BY MOUTH TWICE DAILY AS NEEDED FOR CONSTIPATION 30 tablet 97     White Petrolatum-Mineral Oil (SYSTANE NIGHTTIME) OINT APPLY A THIN RIBBON TO RIGHT LOWER LID TWICE DAILY 3.5 g 97     REVIEW OF SYSTEMS:  Limited secondary to cognitive impairment but today pt reports as noted above    Objective:  BP (!) 157/102   Pulse 66   Temp 97.6  F (36.4  C)   Ht 1.626 m (5' 4\")   Wt 61.6 kg (135 lb 12.8 oz)   SpO2 93%   BMI 23.31 kg/m    Exam:  GENERAL APPEARANCE:  Alert, in no distress, pleasant, cooperative  RESP: no respiratory distress, lung sounds are clear, patient is on room air  CV: regular rate and rhythm. Edema none in bilateral lower extremities.  M/S:   Gait and station: ambulates with walker, able to move all extremities   SKIN:  Inspection and palpation of skin and subcutaneous tissue: skin warm, dry. No surrounding redness or drainage.   NEURO: no facial asymmetry, no speech deficits and able to follow directions, moves all extremities symmetrically  PSYCH:  insight, judgement, and memory impaired, affect and mood normal    Labs:   CBC RESULTS: Recent Labs   Lab Test 01/26/21  0600 01/26/21  0000   WBC 8.5 8.5   RBC 5.27 5.27   HGB 16.0 16.0   HCT 48.6* 48.6*   MCV 92 92   MCH 30.4 30.4   MCHC 32.9 32.9   RDW 14.9* 14.9*    371       Last Basic Metabolic " Panel:  Recent Labs   Lab Test 01/26/21  0600 01/26/21  0000    141   POTASSIUM 4.1 4.1   CHLORIDE 105 105   DIANE 8.7 8.7   CO2 29 29   BUN 26 26   CR 0.97 0.97   * 154*       TSH   Date Value Ref Range Status   01/26/2021 0.84 0.30 - 5.00 uIU/mL Final   01/26/2021 0.84 0.30 - 5.00 uIU/mL Final   03/21/2019 1.74 0.30 - 5.00 uIU/mL Final     ASSESSMENT/PLAN:   (I10) Benign essential hypertension  Comment: BP has been out of goal range with systolics above 150  Plan:  --amlodipine increased to 5 mg this week.   --Continue to monitor blood pressure and heart rate, adjust medications as needed.     (G30.1,  F02.80) Late onset Alzheimer's disease without behavioral disturbance (H)  Comment: chronic, stable. Requires assist with cares and ambulates independently with walker. she had an episode of exit seeking last week and nothing since that time so will monitor for now. She is calm and pleasant today so no need for adjusting meds today.  Plan: continue with memory care assisted living for assistance with cares, meals and medications.   --continue with escitalopram 10 mg daily.     (J44.9) Chronic obstructive pulmonary disease, unspecified COPD type (H)   Comment: no wheezing or s/sx of exacerbation. Previously using oxygen but now on room air with saturations of 93-97%. Was on hospice in the past but did not recertify and was discharged in November 2019.   Plan:   --oxygen PRN  --prednisone 7.5 mg daily.     Electronically signed by:  CYNDI Miramontes CNP

## 2021-08-31 ENCOUNTER — ASSISTED LIVING VISIT (OUTPATIENT)
Dept: GERIATRICS | Facility: CLINIC | Age: 86
End: 2021-08-31
Payer: MEDICARE

## 2021-08-31 VITALS
SYSTOLIC BLOOD PRESSURE: 137 MMHG | DIASTOLIC BLOOD PRESSURE: 82 MMHG | WEIGHT: 135 LBS | OXYGEN SATURATION: 95 % | BODY MASS INDEX: 23.05 KG/M2 | TEMPERATURE: 97.5 F | HEART RATE: 81 BPM | RESPIRATION RATE: 18 BRPM | HEIGHT: 64 IN

## 2021-08-31 DIAGNOSIS — I10 BENIGN ESSENTIAL HYPERTENSION: ICD-10-CM

## 2021-08-31 DIAGNOSIS — I73.9 PVD (PERIPHERAL VASCULAR DISEASE) (H): Primary | ICD-10-CM

## 2021-08-31 DIAGNOSIS — F02.80 LATE ONSET ALZHEIMER'S DISEASE WITHOUT BEHAVIORAL DISTURBANCE (H): ICD-10-CM

## 2021-08-31 DIAGNOSIS — J44.9 CHRONIC OBSTRUCTIVE PULMONARY DISEASE, UNSPECIFIED COPD TYPE (H): ICD-10-CM

## 2021-08-31 DIAGNOSIS — G30.1 LATE ONSET ALZHEIMER'S DISEASE WITHOUT BEHAVIORAL DISTURBANCE (H): ICD-10-CM

## 2021-08-31 PROCEDURE — 99207 PR CDG-MDM COMPONENT: MEETS MODERATE - UP CODED: CPT | Performed by: NURSE PRACTITIONER

## 2021-08-31 ASSESSMENT — MIFFLIN-ST. JEOR: SCORE: 1032.36

## 2021-08-31 NOTE — LETTER
8/31/2021        RE: Zoraida Olivarez  C/o Nava Kiranert  [address] [facility] [address]        [facility]  [place] Medical Record Number:  [id number]  Place of Service where encounter took place:  [facility] ([facility]) [[id number]]  Chief Complaint   Patient presents with     RECHECK       HPI:    Zoraida Olivarez  is a 87 year old (4/11/1934), who is being seen today for an episodic care visit.  HPI information obtained from: facility chart records, facility staff, patient report and [facility] chart review.    Dylan was visited today while in her room while resting in bed. Staff noted a new hematoma on the left lower extremity which has now been wrapped with kerlix. Dylan denies pain or discomfort. No shortness of breath. Feeling tired today.     Past Medical and Surgical History reviewed in Epic today.    MEDICATIONS:    Current Outpatient Medications   Medication Sig Dispense Refill     acetaminophen (TYLENOL) 325 MG tablet TAKE TWO TABLETS (650MG) BY MOUTH EVERY 6 HOURS AS NEEDED FOR PAIN RATED 1-4 60 tablet 97     amLODIPine (NORVASC) 2.5 MG tablet Take 2 tablets (5 mg) by mouth every morning 28 tablet 97     amLODIPine (NORVASC) 5 MG tablet 1 TABLET ORALLY DAILY (DX: HYPERTENSION) 28 tablet 10     ammonium lactate (AMLACTIN) 12 % external cream Apply topically daily as needed for dry skin       ASPIRIN LOW DOSE 81 MG chewable tablet CHEW AND SWALLOW ONE TABLET BY MOUTH ONCE DAILY 28 tablet 97     bisacodyl (DULCOLAX) 10 MG suppository Place 10 mg rectally daily as needed for constipation       escitalopram (LEXAPRO) 10 MG tablet TAKE 1 TABLET BY MOUTH EVERY MORNING 28 tablet PRN     estradiol (ESTRACE) 0.5 MG tablet TAKE ONE-HALF TABLET (0.25MG) BY MOUTH ONCE DAILY 14 tablet 97     guaiFENesin (ROBAFEN) 100 MG/5ML SYRP Take 10 mLs by mouth 2 times daily as needed for cough 473 mL 97     ipratropium-albuterol (COMBIVENT RESPIMAT)  MCG/ACT inhaler  "Inhale 2 puffs into the lungs daily       levETIRAcetam (KEPPRA) 250 MG tablet TAKE 1 TABLET BY MOUTH ONCE DAILY 30 tablet PRN     melatonin 3 MG tablet TAKE 1 TABLET BY MOUTH ONCE DAILY 30 tablet PRN     Nutritional Supplements (ENSURE PLUS) LIQD Take by mouth 2 times daily       polyethylene glycol (MIRALAX) 17 GM/Dose powder MIX 17GM OF POWDER IN 8OZ OF WATER UNTIL COMPLETELY DISSOLVED. DRINK SOLUTION BY MOUTH ONCE DAILY 510 g 97     predniSONE (DELTASONE) 5 MG tablet Take 1.5 tablets (7.5 mg) by mouth daily       Sennosides (EQ NATURAL VEGETABLE LAXATIVE PO) Take 8.6 mg by mouth 2 times daily as needed        sennosides (SENOKOT) 8.6 MG tablet TAKE 1 TABLET BY MOUTH TWICE DAILY AS NEEDED FOR CONSTIPATION 30 tablet 97     White Petrolatum-Mineral Oil (SYSTANE NIGHTTIME) OINT APPLY A THIN RIBBON TO RIGHT LOWER LID TWICE DAILY 3.5 g 97     REVIEW OF SYSTEMS:  Limited secondary to cognitive impairment but today pt reports as noted above    Objective:  /82   Pulse 81   Temp 97.5  F (36.4  C)   Resp 18   Ht 1.626 m (5' 4\")   Wt 61.2 kg (135 lb)   SpO2 95%   BMI 23.17 kg/m    Exam:  GENERAL APPEARANCE:  Alert, in no distress, pleasant, cooperative  RESP: no respiratory distress, lung sounds are clear, patient is on room air  CV: regular rate and rhythm. Edema none in bilateral lower extremities.  M/S:   Gait and station: ambulates with walker, able to move all extremities   SKIN:  Inspection and palpation of skin and subcutaneous tissue: skin warm, dry.   NEURO: no facial asymmetry, no speech deficits and able to follow directions, moves all extremities symmetrically  PSYCH:  insight, judgement, and memory impaired, affect and mood normal    Labs:   CBC RESULTS: Recent Labs   Lab Test 01/26/21  0600 01/26/21  0000   WBC 8.5 8.5   RBC 5.27 5.27   HGB 16.0 16.0   HCT 48.6* 48.6*   MCV 92 92   MCH 30.4 30.4   MCHC 32.9 32.9   RDW 14.9* 14.9*    371       Last Basic Metabolic Panel:  Recent Labs   Lab " Test 01/26/21  0600 01/26/21  0000    141   POTASSIUM 4.1 4.1   CHLORIDE 105 105   DIANE 8.7 8.7   CO2 29 29   BUN 26 26   CR 0.97 0.97   * 154*       TSH   Date Value Ref Range Status   01/26/2021 0.84 0.30 - 5.00 uIU/mL Final   01/26/2021 0.84 0.30 - 5.00 uIU/mL Final   03/21/2019 1.74 0.30 - 5.00 uIU/mL Final     ASSESSMENT/PLAN:   (P173.9) Peripheral Vascular Disease  Comment: had a large hematoma on the LLE earlier this week but it appears to have erupted. This has happened twice in the past. dermasaver shin tubes are in place which has helpful.   --wrap the area with kerlix then cover both legs with dermasaver shin tubes to protect from further wounds and skin tears.     (I10) Benign essential hypertension  Comment: BP has been controlled in the 130's after increasing amlodipine to 5 mg daily.   Plan:  --continue with amlodipine 5 mg   --Continue to monitor blood pressure and heart rate, adjust medications as needed.     (G30.1,  F02.80) Late onset Alzheimer's disease without behavioral disturbance (H)  Comment: chronic, stable. Requires assist with cares and ambulates independently with walker. She is calm and pleasant today.   Plan: continue with memory care assisted living for assistance with cares, meals and medications.   --continue with escitalopram 10 mg daily.     (J44.9) Chronic obstructive pulmonary disease, unspecified COPD type (H)   Comment: no wheezing or s/sx of exacerbation. Previously using oxygen but now on room air with saturations of 93-97%.   Plan:   --oxygen PRN  --prednisone 7.5 mg daily.     Electronically signed by:  CYNDI Miramontes CNP                     Sincerely,        CYNDI Miramontes CNP

## 2021-08-31 NOTE — PROGRESS NOTES
[facility]  [place] Medical Record Number:  [id number]  Place of Service where encounter took place:  [facility] ([facility]) [[id number]]  Chief Complaint   Patient presents with     RECHECK       HPI:    Zoraida Olivarez  is a 87 year old (4/11/1934), who is being seen today for an episodic care visit.  HPI information obtained from: facility chart records, facility staff, patient report and [facility] chart review.    Dylan was visited today while in her room while resting in bed. Staff noted a new hematoma on the left lower extremity which has now been wrapped with kerlix. Dylan denies pain or discomfort. No shortness of breath. Feeling tired today.     Past Medical and Surgical History reviewed in Epic today.    MEDICATIONS:    Current Outpatient Medications   Medication Sig Dispense Refill     acetaminophen (TYLENOL) 325 MG tablet TAKE TWO TABLETS (650MG) BY MOUTH EVERY 6 HOURS AS NEEDED FOR PAIN RATED 1-4 60 tablet 97     amLODIPine (NORVASC) 2.5 MG tablet Take 2 tablets (5 mg) by mouth every morning 28 tablet 97     amLODIPine (NORVASC) 5 MG tablet 1 TABLET ORALLY DAILY (DX: HYPERTENSION) 28 tablet 10     ammonium lactate (AMLACTIN) 12 % external cream Apply topically daily as needed for dry skin       ASPIRIN LOW DOSE 81 MG chewable tablet CHEW AND SWALLOW ONE TABLET BY MOUTH ONCE DAILY 28 tablet 97     bisacodyl (DULCOLAX) 10 MG suppository Place 10 mg rectally daily as needed for constipation       escitalopram (LEXAPRO) 10 MG tablet TAKE 1 TABLET BY MOUTH EVERY MORNING 28 tablet PRN     estradiol (ESTRACE) 0.5 MG tablet TAKE ONE-HALF TABLET (0.25MG) BY MOUTH ONCE DAILY 14 tablet 97     guaiFENesin (ROBAFEN) 100 MG/5ML SYRP Take 10 mLs by mouth 2 times daily as needed for cough 473 mL 97     ipratropium-albuterol (COMBIVENT RESPIMAT)  MCG/ACT inhaler Inhale 2 puffs into the lungs daily       levETIRAcetam (KEPPRA) 250 MG tablet TAKE 1 TABLET BY MOUTH ONCE DAILY  "30 tablet PRN     melatonin 3 MG tablet TAKE 1 TABLET BY MOUTH ONCE DAILY 30 tablet PRN     Nutritional Supplements (ENSURE PLUS) LIQD Take by mouth 2 times daily       polyethylene glycol (MIRALAX) 17 GM/Dose powder MIX 17GM OF POWDER IN 8OZ OF WATER UNTIL COMPLETELY DISSOLVED. DRINK SOLUTION BY MOUTH ONCE DAILY 510 g 97     predniSONE (DELTASONE) 5 MG tablet Take 1.5 tablets (7.5 mg) by mouth daily       Sennosides (EQ NATURAL VEGETABLE LAXATIVE PO) Take 8.6 mg by mouth 2 times daily as needed        sennosides (SENOKOT) 8.6 MG tablet TAKE 1 TABLET BY MOUTH TWICE DAILY AS NEEDED FOR CONSTIPATION 30 tablet 97     White Petrolatum-Mineral Oil (SYSTANE NIGHTTIME) OINT APPLY A THIN RIBBON TO RIGHT LOWER LID TWICE DAILY 3.5 g 97     REVIEW OF SYSTEMS:  Limited secondary to cognitive impairment but today pt reports as noted above    Objective:  /82   Pulse 81   Temp 97.5  F (36.4  C)   Resp 18   Ht 1.626 m (5' 4\")   Wt 61.2 kg (135 lb)   SpO2 95%   BMI 23.17 kg/m    Exam:  GENERAL APPEARANCE:  Alert, in no distress, pleasant, cooperative  RESP: no respiratory distress, lung sounds are clear, patient is on room air  CV: regular rate and rhythm. Edema none in bilateral lower extremities.  M/S:   Gait and station: ambulates with walker, able to move all extremities   SKIN:  Inspection and palpation of skin and subcutaneous tissue: skin warm, dry.   NEURO: no facial asymmetry, no speech deficits and able to follow directions, moves all extremities symmetrically  PSYCH:  insight, judgement, and memory impaired, affect and mood normal    Labs:   CBC RESULTS: Recent Labs   Lab Test 01/26/21  0600 01/26/21  0000   WBC 8.5 8.5   RBC 5.27 5.27   HGB 16.0 16.0   HCT 48.6* 48.6*   MCV 92 92   MCH 30.4 30.4   MCHC 32.9 32.9   RDW 14.9* 14.9*    371       Last Basic Metabolic Panel:  Recent Labs   Lab Test 01/26/21  0600 01/26/21  0000    141   POTASSIUM 4.1 4.1   CHLORIDE 105 105   DIANE 8.7 8.7   CO2 29 29 "   BUN 26 26   CR 0.97 0.97   * 154*       TSH   Date Value Ref Range Status   01/26/2021 0.84 0.30 - 5.00 uIU/mL Final   01/26/2021 0.84 0.30 - 5.00 uIU/mL Final   03/21/2019 1.74 0.30 - 5.00 uIU/mL Final     ASSESSMENT/PLAN:   (P173.9) Peripheral Vascular Disease  Comment: had a large hematoma on the LLE earlier this week but it appears to have erupted. This has happened twice in the past. dermasaver shin tubes are in place which has helpful.   --wrap the area with kerlix then cover both legs with dermasaver shin tubes to protect from further wounds and skin tears.     (I10) Benign essential hypertension  Comment: BP has been controlled in the 130's after increasing amlodipine to 5 mg daily.   Plan:  --continue with amlodipine 5 mg   --Continue to monitor blood pressure and heart rate, adjust medications as needed.     (G30.1,  F02.80) Late onset Alzheimer's disease without behavioral disturbance (H)  Comment: chronic, stable. Requires assist with cares and ambulates independently with walker. She is calm and pleasant today.   Plan: continue with memory care assisted living for assistance with cares, meals and medications.   --continue with escitalopram 10 mg daily.     (J44.9) Chronic obstructive pulmonary disease, unspecified COPD type (H)   Comment: no wheezing or s/sx of exacerbation. Previously using oxygen but now on room air with saturations of 93-97%.   Plan:   --oxygen PRN  --prednisone 7.5 mg daily.     Electronically signed by:  CYNDI Miramontes CNP

## 2021-09-12 ENCOUNTER — HEALTH MAINTENANCE LETTER (OUTPATIENT)
Age: 86
End: 2021-09-12

## 2021-11-04 ENCOUNTER — ASSISTED LIVING VISIT (OUTPATIENT)
Dept: GERIATRICS | Facility: CLINIC | Age: 86
End: 2021-11-04
Payer: MEDICARE

## 2021-11-04 VITALS
TEMPERATURE: 97.5 F | RESPIRATION RATE: 20 BRPM | HEART RATE: 72 BPM | BODY MASS INDEX: 24.13 KG/M2 | SYSTOLIC BLOOD PRESSURE: 137 MMHG | DIASTOLIC BLOOD PRESSURE: 97 MMHG | WEIGHT: 140.6 LBS

## 2021-11-04 DIAGNOSIS — R13.10 DYSPHAGIA, UNSPECIFIED TYPE: ICD-10-CM

## 2021-11-04 DIAGNOSIS — F02.80 LATE ONSET ALZHEIMER'S DISEASE WITHOUT BEHAVIORAL DISTURBANCE (H): Primary | ICD-10-CM

## 2021-11-04 DIAGNOSIS — I48.0 PAF (PAROXYSMAL ATRIAL FIBRILLATION) (H): ICD-10-CM

## 2021-11-04 DIAGNOSIS — R32 URINARY INCONTINENCE, UNSPECIFIED TYPE: ICD-10-CM

## 2021-11-04 DIAGNOSIS — J44.9 CHRONIC OBSTRUCTIVE PULMONARY DISEASE, UNSPECIFIED COPD TYPE (H): ICD-10-CM

## 2021-11-04 DIAGNOSIS — G30.1 LATE ONSET ALZHEIMER'S DISEASE WITHOUT BEHAVIORAL DISTURBANCE (H): Primary | ICD-10-CM

## 2021-11-04 DIAGNOSIS — F43.21 ADJUSTMENT DISORDER WITH DEPRESSED MOOD: ICD-10-CM

## 2021-11-04 DIAGNOSIS — Z71.89 ADVANCED DIRECTIVES, COUNSELING/DISCUSSION: ICD-10-CM

## 2021-11-04 DIAGNOSIS — I10 BENIGN ESSENTIAL HYPERTENSION: ICD-10-CM

## 2021-11-04 DIAGNOSIS — E55.9 VITAMIN D DEFICIENCY: ICD-10-CM

## 2021-11-04 DIAGNOSIS — N18.31 STAGE 3A CHRONIC KIDNEY DISEASE (H): ICD-10-CM

## 2021-11-04 DIAGNOSIS — I73.9 PVD (PERIPHERAL VASCULAR DISEASE) (H): ICD-10-CM

## 2021-11-04 PROCEDURE — 81001 URINALYSIS AUTO W/SCOPE: CPT | Mod: ORL | Performed by: NURSE PRACTITIONER

## 2021-11-04 PROCEDURE — 87086 URINE CULTURE/COLONY COUNT: CPT | Mod: ORL | Performed by: NURSE PRACTITIONER

## 2021-11-04 NOTE — PROGRESS NOTES
"[facility]  [place] Medical Record Number:  [id number]  Place of Service where encounter took place:  [facility] ([facility]) [[id number]]  Chief Complaint   Patient presents with     RECHECK       HPI:    Zoraida Olivarez  is a 87 year old (4/11/1934), who is being seen today for an episodic care visit.  HPI information obtained from: facility chart records, facility staff, patient report, [facility] chart review and family/first contact daughter Saba Bruce report.  She has lived on Memory Care at this facility since 3/2019. Medical history significant for dementia, COPD, afib,  PVD with bilateral iliac stents 2007, essential tremor, herpes zoster 1/2021, depression, chronic diarrhea, cerebral aneurysm repair in 1980 and on  Keppra for seizure prophylaxis.   She was hospitalized at [facility] 2/2019 for E coli UTI with sepsis, afib with RVR and COPD exacerbation. She was on hospice, but improved and was discharged from hospice 11/2019.      Today's concern is:     Late onset Alzheimer's disease without behavioral disturbance (H)  Adjustment disorder with depressed mood  PAF (paroxysmal atrial fibrillation) (H)  Chronic obstructive pulmonary disease, unspecified COPD type (H)  PVD (peripheral vascular disease) (H)  Stage 3a chronic kidney disease (H)  Dysphagia, unspecified type  Benign essential hypertension  Urinary incontinence, unspecified type  Advanced directives, counseling/discussion   She is minimally verbal and unable to provide history. \"I'm ok.\" Ambulates without a device. Requires supervision to max assist with cares.   Spoke with her daughter Saba Bruce who is concerned about dysphagia and worsening urinary incontinence. Daughter has found partially chewed food in her mouth 45 mins after meals. Reports patient's bed is soaked at night and this is new. Nurse reports wet sounding cough with meals and  that patient was on pureed diet in the past but " her oral intake declined and she was placed back on mechanical soft. No aspiration events have been reported.  Nurse also confirms that urinary incontinence has been worse and patient has been more resistive with cares recently.     Past Medical and Surgical History reviewed in Epic today.    MEDICATIONS:    Current Outpatient Medications   Medication Sig Dispense Refill     acetaminophen (TYLENOL) 325 MG tablet TAKE TWO TABLETS (650MG) BY MOUTH EVERY 6 HOURS AS NEEDED FOR PAIN RATED 1-4 60 tablet 97     amLODIPine (NORVASC) 2.5 MG tablet Take 2 tablets (5 mg) by mouth every morning 28 tablet 97     amLODIPine (NORVASC) 5 MG tablet 1 TABLET ORALLY DAILY (DX: HYPERTENSION) 28 tablet 10     ammonium lactate (AMLACTIN) 12 % external cream Apply topically daily as needed for dry skin       ASPIRIN LOW DOSE 81 MG chewable tablet CHEW AND SWALLOW ONE TABLET BY MOUTH ONCE DAILY 28 tablet 97     bisacodyl (DULCOLAX) 10 MG suppository Place 10 mg rectally daily as needed for constipation       escitalopram (LEXAPRO) 10 MG tablet TAKE 1 TABLET BY MOUTH EVERY MORNING 28 tablet PRN     estradiol (ESTRACE) 0.5 MG tablet TAKE ONE-HALF TABLET (0.25MG) BY MOUTH ONCE DAILY 14 tablet 97     guaiFENesin (ROBAFEN) 100 MG/5ML SYRP Take 10 mLs by mouth 2 times daily as needed for cough 473 mL 97     ipratropium-albuterol (COMBIVENT RESPIMAT)  MCG/ACT inhaler Inhale 2 puffs into the lungs daily       levETIRAcetam (KEPPRA) 250 MG tablet TAKE 1 TABLET BY MOUTH ONCE DAILY 30 tablet PRN     melatonin 3 MG tablet TAKE 1 TABLET BY MOUTH ONCE DAILY 30 tablet PRN     Nutritional Supplements (ENSURE PLUS) LIQD Take by mouth 2 times daily       polyethylene glycol (MIRALAX) 17 GM/Dose powder MIX 17GM OF POWDER IN 8OZ OF WATER UNTIL COMPLETELY DISSOLVED. DRINK SOLUTION BY MOUTH ONCE DAILY 510 g 97     predniSONE (DELTASONE) 5 MG tablet Take 1.5 tablets (7.5 mg) by mouth daily       Sennosides (EQ NATURAL VEGETABLE LAXATIVE PO) Take 8.6 mg by  mouth 2 times daily as needed        sennosides (SENOKOT) 8.6 MG tablet TAKE 1 TABLET BY MOUTH TWICE DAILY AS NEEDED FOR CONSTIPATION 30 tablet 97     White Petrolatum-Mineral Oil (SYSTANE NIGHTTIME) OINT APPLY A THIN RIBBON TO RIGHT LOWER LID TWICE DAILY 3.5 g 97         REVIEW OF SYSTEMS:  Unobtainable secondary to cognitive impairment.     Objective:  BP (!) 137/97   Pulse 72   Temp 97.5  F (36.4  C)   Resp 20   Wt 63.8 kg (140 lb 9.6 oz)   BMI 24.13 kg/m    Exam:  GENERAL APPEARANCE:  Alert, in no distress, frail appearing   ENT:  [place], oropharynx clear  EYES:  EOM normal, conjunctiva and lids normal  NECK: no adenopathy,masses or thyromegaly  RESP: decreased breath sounds bilaterally, no crackles or wheezes   CV:  regular rate and rhythm, no murmur, no edema   ABDOMEN:  soft, non-tender, no distension, no masses  M/S:   up in chair. Bilateral hand tremor. PORTILLO with good strength. No joint inflammation  SKIN:  no visible rashes or open areas  PSYCH:  oriented to self, insight and judgement impaired, memory impaired , flat affect    Labs:   Recent labs in [employer] reviewed by me today.     ASSESSMENT / PLAN:  (G30.1,  F02.80) Late onset Alzheimer's disease without behavioral disturbance (H)  (primary encounter diagnosis)  (F43.21) Adjustment disorder with depressed mood  Comment: severe deficits with low functional status. She had SLUMS 1/30 and ACL 3.8 in 2019.   Plan: Memory Care staff to assist with all cares, meals and med admin.     (I48.0) PAF (paroxysmal atrial fibrillation) (H)  Comment: HR: 72-74. Not requiring rate control   Plan: continue ASA    (J44.9) Chronic obstructive pulmonary disease, unspecified COPD type (H)  Comment: no s/s  of exacerbation   Plan: continue Combivent and prednisone.     (I73.9) PVD (peripheral vascular disease) (H)  Comment: history of venous stasis ulcers, healed   Plan: continue ASA. Monitor skin     (N18.31) Stage 3a chronic kidney disease (H)  Comment: creatinine 0.97 on  2021  Plan: BMP. Avoid nephrotoxins     (R13.10) Dysphagia, unspecified type  Comment: due to progressive dementia. Coughing episodes and pocketing food noted by daughter and staff.   Plan: refer for home SPEECH THERAPY. In the interim, continue mechanical soft diet and change to ground meat. Closely monitor for aspiration.     (I10) Benign essential hypertension  Comment: controlled with BPs: 137/97, 129/67, 144/92    Plan: continue amlodipine     (R32) Urinary incontinence, unspecified type  Comment: worsening incontinence and frequency   Plan: UA/UC. CBC, BMP.     Vitamin D deficiency  Comment: receiving cholecalciferol 5000 units every other day. Vitamin D level 24.9 on 2021  Plan: vitamin D 25 hydroxy level. Continue supplement.     (Z71.89) Advanced directives, counseling/discussion  Comment: she has a healthcare directive and daughter confirms DNR/DNI with focus on comfort   Plan: orders updated         Total time with an established patient visit in the assisted livin minutes including discussions about the POC and care coordination with daughter Saba Bruce. Greater than 50% of total time spent with counseling and coordinating care due to worsening incontinency and dysphagia in the setting of progressive dementia. Coordinating the following with facility staff: change in diet, SPEECH THERAPY referral, labs and UA/UC.       Electronically signed by:  CYNDI Glass CNP

## 2021-11-04 NOTE — LETTER
"    11/4/2021        RE: Zoraida Olivarez  C/o Nava Bruce  [address]  [facility] [address]        [facility]  [place] Medical Record Number:  [id number]  Place of Service where encounter took place:  [facility] ([facility]) [[id number]]  Chief Complaint   Patient presents with     RECHECK       HPI:    Zoraida Olivarez  is a 87 year old (4/11/1934), who is being seen today for an episodic care visit.  HPI information obtained from: facility chart records, facility staff, patient report, [facility] chart review and family/first contact daughter Saba Bruce report.  She has lived on Memory Care at this facility since 3/2019. Medical history significant for dementia, COPD, afib,  PVD with bilateral iliac stents 2007, essential tremor, herpes zoster 1/2021, depression, chronic diarrhea, cerebral aneurysm repair in 1980 and on  Keppra for seizure prophylaxis.   She was hospitalized at [facility] 2/2019 for E coli UTI with sepsis, afib with RVR and COPD exacerbation. She was on hospice, but improved and was discharged from hospice 11/2019.      Today's concern is:     Late onset Alzheimer's disease without behavioral disturbance (H)  Adjustment disorder with depressed mood  PAF (paroxysmal atrial fibrillation) (H)  Chronic obstructive pulmonary disease, unspecified COPD type (H)  PVD (peripheral vascular disease) (H)  Stage 3a chronic kidney disease (H)  Dysphagia, unspecified type  Benign essential hypertension  Urinary incontinence, unspecified type  Advanced directives, counseling/discussion   She is minimally verbal and unable to provide history. \"I'm ok.\" Ambulates without a device. Requires supervision to max assist with cares.   Spoke with her daughter Saba Bruce who is concerned about dysphagia and worsening urinary incontinence. Daughter has found partially chewed food in her mouth 45 mins after meals. Reports patient's bed is soaked at night " and this is new. Nurse reports wet sounding cough with meals and  that patient was on pureed diet in the past but her oral intake declined and she was placed back on mechanical soft. No aspiration events have been reported.  Nurse also confirms that urinary incontinence has been worse and patient has been more resistive with cares recently.     Past Medical and Surgical History reviewed in Epic today.    MEDICATIONS:    Current Outpatient Medications   Medication Sig Dispense Refill     acetaminophen (TYLENOL) 325 MG tablet TAKE TWO TABLETS (650MG) BY MOUTH EVERY 6 HOURS AS NEEDED FOR PAIN RATED 1-4 60 tablet 97     amLODIPine (NORVASC) 2.5 MG tablet Take 2 tablets (5 mg) by mouth every morning 28 tablet 97     amLODIPine (NORVASC) 5 MG tablet 1 TABLET ORALLY DAILY (DX: HYPERTENSION) 28 tablet 10     ammonium lactate (AMLACTIN) 12 % external cream Apply topically daily as needed for dry skin       ASPIRIN LOW DOSE 81 MG chewable tablet CHEW AND SWALLOW ONE TABLET BY MOUTH ONCE DAILY 28 tablet 97     bisacodyl (DULCOLAX) 10 MG suppository Place 10 mg rectally daily as needed for constipation       escitalopram (LEXAPRO) 10 MG tablet TAKE 1 TABLET BY MOUTH EVERY MORNING 28 tablet PRN     estradiol (ESTRACE) 0.5 MG tablet TAKE ONE-HALF TABLET (0.25MG) BY MOUTH ONCE DAILY 14 tablet 97     guaiFENesin (ROBAFEN) 100 MG/5ML SYRP Take 10 mLs by mouth 2 times daily as needed for cough 473 mL 97     ipratropium-albuterol (COMBIVENT RESPIMAT)  MCG/ACT inhaler Inhale 2 puffs into the lungs daily       levETIRAcetam (KEPPRA) 250 MG tablet TAKE 1 TABLET BY MOUTH ONCE DAILY 30 tablet PRN     melatonin 3 MG tablet TAKE 1 TABLET BY MOUTH ONCE DAILY 30 tablet PRN     Nutritional Supplements (ENSURE PLUS) LIQD Take by mouth 2 times daily       polyethylene glycol (MIRALAX) 17 GM/Dose powder MIX 17GM OF POWDER IN 8OZ OF WATER UNTIL COMPLETELY DISSOLVED. DRINK SOLUTION BY MOUTH ONCE DAILY 510 g 97     predniSONE (DELTASONE) 5 MG  tablet Take 1.5 tablets (7.5 mg) by mouth daily       Sennosides (EQ NATURAL VEGETABLE LAXATIVE PO) Take 8.6 mg by mouth 2 times daily as needed        sennosides (SENOKOT) 8.6 MG tablet TAKE 1 TABLET BY MOUTH TWICE DAILY AS NEEDED FOR CONSTIPATION 30 tablet 97     White Petrolatum-Mineral Oil (SYSTANE NIGHTTIME) OINT APPLY A THIN RIBBON TO RIGHT LOWER LID TWICE DAILY 3.5 g 97         REVIEW OF SYSTEMS:  Unobtainable secondary to cognitive impairment.     Objective:  BP (!) 137/97   Pulse 72   Temp 97.5  F (36.4  C)   Resp 20   Wt 63.8 kg (140 lb 9.6 oz)   BMI 24.13 kg/m    Exam:  GENERAL APPEARANCE:  Alert, in no distress, frail appearing   ENT:  [place], oropharynx clear  EYES:  EOM normal, conjunctiva and lids normal  NECK: no adenopathy,masses or thyromegaly  RESP: decreased breath sounds bilaterally, no crackles or wheezes   CV:  regular rate and rhythm, no murmur, no edema   ABDOMEN:  soft, non-tender, no distension, no masses  M/S:   up in chair. Bilateral hand tremor. PORTILLO with good strength. No joint inflammation  SKIN:  no visible rashes or open areas  PSYCH:  oriented to self, insight and judgement impaired, memory impaired , flat affect    Labs:   Recent labs in [employer] reviewed by me today.     ASSESSMENT / PLAN:  (G30.1,  F02.80) Late onset Alzheimer's disease without behavioral disturbance (H)  (primary encounter diagnosis)  (F43.21) Adjustment disorder with depressed mood  Comment: severe deficits with low functional status. She had SLUMS 1/30 and ACL 3.8 in 2019.   Plan: Memory Care staff to assist with all cares, meals and med admin.     (I48.0) PAF (paroxysmal atrial fibrillation) (H)  Comment: HR: 72-74. Not requiring rate control   Plan: continue ASA    (J44.9) Chronic obstructive pulmonary disease, unspecified COPD type (H)  Comment: no s/s  of exacerbation   Plan: continue Combivent and prednisone.     (I73.9) PVD (peripheral vascular disease) (H)  Comment: history of venous stasis ulcers, healed    Plan: continue ASA. Monitor skin     (N18.31) Stage 3a chronic kidney disease (H)  Comment: creatinine 0.97 on 2021  Plan: BMP. Avoid nephrotoxins     (R13.10) Dysphagia, unspecified type  Comment: due to progressive dementia. Coughing episodes and pocketing food noted by daughter and staff.   Plan: refer for home SPEECH THERAPY. In the interim, continue mechanical soft diet and change to ground meat. Closely monitor for aspiration.     (I10) Benign essential hypertension  Comment: controlled with BPs: 137/97, 129/67, 144/92    Plan: continue amlodipine     (R32) Urinary incontinence, unspecified type  Comment: worsening incontinence and frequency   Plan: UA/UC. CBC, BMP.     Vitamin D deficiency  Comment: receiving cholecalciferol 5000 units every other day. Vitamin D level 24.9 on 2021  Plan: vitamin D 25 hydroxy level. Continue supplement.     (Z71.89) Advanced directives, counseling/discussion  Comment: she has a healthcare directive and daughter confirms DNR/DNI with focus on comfort   Plan: orders updated         Total time with an established patient visit in the assisted livin minutes including discussions about the POC and care coordination with daughter Saba Bruce. Greater than 50% of total time spent with counseling and coordinating care due to worsening incontinency and dysphagia in the setting of progressive dementia. Coordinating the following with facility staff: change in diet, SPEECH THERAPY referral, labs and UA/UC.       Electronically signed by:  CYNDI Glass CNP                 Sincerely,        CYNDI Glass CNP

## 2021-11-05 ENCOUNTER — LAB REQUISITION (OUTPATIENT)
Dept: LAB | Facility: CLINIC | Age: 86
End: 2021-11-05
Payer: MEDICARE

## 2021-11-05 DIAGNOSIS — R32 UNSPECIFIED URINARY INCONTINENCE: ICD-10-CM

## 2021-11-05 LAB
ALBUMIN UR-MCNC: NEGATIVE MG/DL
AMORPH CRY #/AREA URNS HPF: ABNORMAL /HPF
APPEARANCE UR: ABNORMAL
BACTERIA #/AREA URNS HPF: ABNORMAL /HPF
BILIRUB UR QL STRIP: NEGATIVE
COLOR UR AUTO: YELLOW
GLUCOSE UR STRIP-MCNC: NEGATIVE MG/DL
HGB UR QL STRIP: NEGATIVE
KETONES UR STRIP-MCNC: NEGATIVE MG/DL
LEUKOCYTE ESTERASE UR QL STRIP: NEGATIVE
MUCOUS THREADS #/AREA URNS LPF: PRESENT /LPF
NITRATE UR QL: POSITIVE
PH UR STRIP: 6.5 [PH] (ref 5–7)
RBC URINE: 0 /HPF
SP GR UR STRIP: 1.02 (ref 1–1.03)
SQUAMOUS EPITHELIAL: 1 /HPF
UROBILINOGEN UR STRIP-MCNC: <2 MG/DL
WBC URINE: 7 /HPF

## 2021-11-07 LAB — BACTERIA UR CULT: ABNORMAL

## 2021-11-08 ENCOUNTER — ASSISTED LIVING VISIT (OUTPATIENT)
Dept: GERIATRICS | Facility: CLINIC | Age: 86
End: 2021-11-08
Payer: MEDICARE

## 2021-11-08 VITALS
BODY MASS INDEX: 24.65 KG/M2 | HEART RATE: 84 BPM | TEMPERATURE: 97.5 F | SYSTOLIC BLOOD PRESSURE: 127 MMHG | WEIGHT: 143.6 LBS | DIASTOLIC BLOOD PRESSURE: 70 MMHG | RESPIRATION RATE: 27 BRPM

## 2021-11-08 DIAGNOSIS — G30.1 LATE ONSET ALZHEIMER'S DISEASE WITHOUT BEHAVIORAL DISTURBANCE (H): ICD-10-CM

## 2021-11-08 DIAGNOSIS — F43.21 ADJUSTMENT DISORDER WITH DEPRESSED MOOD: ICD-10-CM

## 2021-11-08 DIAGNOSIS — R13.10 DYSPHAGIA, UNSPECIFIED TYPE: ICD-10-CM

## 2021-11-08 DIAGNOSIS — N39.0 URINARY TRACT INFECTION WITHOUT HEMATURIA, SITE UNSPECIFIED: Primary | ICD-10-CM

## 2021-11-08 DIAGNOSIS — F02.80 LATE ONSET ALZHEIMER'S DISEASE WITHOUT BEHAVIORAL DISTURBANCE (H): ICD-10-CM

## 2021-11-08 DIAGNOSIS — I10 BENIGN ESSENTIAL HYPERTENSION: ICD-10-CM

## 2021-11-08 RX ORDER — NITROFURANTOIN 25; 75 MG/1; MG/1
100 CAPSULE ORAL 2 TIMES DAILY
COMMUNITY
Start: 2021-11-08 | End: 2021-11-13

## 2021-11-08 NOTE — PROGRESS NOTES
[facility]  [place] Medical Record Number:  [id number]  Place of Service where encounter took place:  [facility]  [facility] ([facility]) [[id number]]  Chief Complaint   Patient presents with     RECHECK       HPI:    Zoraida Olivarez  is a 87 year old (4/11/1934), who is being seen today for an episodic care visit.  HPI information obtained from: facility chart records, facility staff, patient report, [facility] chart review and family/first contact daughter report. She has lived on Memory Care at this facility since 3/2019. Medical history significant for dementia, COPD, afib,  PVD with bilateral iliac stents 2007, essential tremor, herpes zoster 1/2021, depression, chronic diarrhea, cerebral aneurysm repair in 1980 and on Keppra for seizure prophylaxis.   She was hospitalized at [facility] 2/2019 for E coli UTI with sepsis, afib with RVR and COPD exacerbation. She was on hospice, but improved and discharged from hospice 11/2019.     Today's concern is:     Urinary tract infection without hematuria, site unspecified  Late onset Alzheimer's disease without behavioral disturbance (H)  Adjustment disorder with depressed mood  Dysphagia, unspecified type  Benign essential hypertension   UA/UC was obtained 11/4/2021 due to increased incontinence and grew >100,000 E coli. Afebrile. She is a poor historian, but more interactive than she was on 11/4/2021. Reports feeling well. Denies pain of any type. Up and about in her apt without a device.   Referral was made for home SPEECH THERAPY due to pocketing food and coughing with meals.     Past Medical and Surgical History reviewed in Epic today.    MEDICATIONS:    Current Outpatient Medications   Medication Sig Dispense Refill     acetaminophen (TYLENOL) 325 MG tablet TAKE TWO TABLETS (650MG) BY MOUTH EVERY 6 HOURS AS NEEDED FOR PAIN RATED 1-4 60 tablet 97     amLODIPine (NORVASC) 5 MG tablet 1 TABLET ORALLY DAILY  (DX: HYPERTENSION) 28 tablet 10     ammonium lactate (AMLACTIN) 12 % external cream Apply topically daily as needed for dry skin       ASPIRIN LOW DOSE 81 MG chewable tablet CHEW AND SWALLOW ONE TABLET BY MOUTH ONCE DAILY 28 tablet 97     escitalopram (LEXAPRO) 10 MG tablet TAKE 1 TABLET BY MOUTH EVERY MORNING 28 tablet PRN     estradiol (ESTRACE) 0.5 MG tablet TAKE ONE-HALF TABLET (0.25MG) BY MOUTH ONCE DAILY 14 tablet 97     guaiFENesin (ROBAFEN) 100 MG/5ML SYRP Take 10 mLs by mouth 2 times daily as needed for cough 473 mL 97     ipratropium-albuterol (COMBIVENT RESPIMAT)  MCG/ACT inhaler Inhale 2 puffs into the lungs daily       levETIRAcetam (KEPPRA) 250 MG tablet TAKE 1 TABLET BY MOUTH ONCE DAILY 30 tablet PRN     melatonin 3 MG tablet TAKE 1 TABLET BY MOUTH ONCE DAILY (Patient taking differently: 3 mg At Bedtime ) 30 tablet PRN     polyethylene glycol (MIRALAX) 17 GM/Dose powder MIX 17GM OF POWDER IN 8OZ OF WATER UNTIL COMPLETELY DISSOLVED. DRINK SOLUTION BY MOUTH ONCE DAILY 510 g 97     predniSONE (DELTASONE) 5 MG tablet Take 1.5 tablets (7.5 mg) by mouth daily       sennosides (SENOKOT) 8.6 MG tablet TAKE 1 TABLET BY MOUTH TWICE DAILY AS NEEDED FOR CONSTIPATION 30 tablet 97     Vitamin D3 (CHOLECALCIFEROL) 125 MCG (5000 UT) tablet Take by mouth every other day       White Petrolatum-Mineral Oil (SYSTANE NIGHTTIME) OINT APPLY A THIN RIBBON TO RIGHT LOWER LID TWICE DAILY 3.5 g 97         REVIEW OF SYSTEMS:  Unobtainable secondary to cognitive impairment.     Objective:  /70   Pulse 84   Temp 97.5  F (36.4  C)   Resp 27   Wt 65.1 kg (143 lb 9.6 oz)   BMI 24.65 kg/m    Exam:  GENERAL APPEARANCE:  Alert, in no distress   ENT:  [place], oropharynx clear  EYES:  EOM normal, conjunctiva and lids normal  NECK: no adenopathy,masses or thyromegaly  RESP: decreased breath sounds bilaterally, no crackles or wheezes   CV:  regular rate and rhythm, no murmur, no edema   ABDOMEN:  soft, non-tender, no  distension, no masses  M/S:  gait steady.  Bilateral hand tremor. PORTILLO with good strength. No joint inflammation  SKIN:  no visible rashes or open areas  PSYCH:  oriented to self, insight and judgement impaired, memory impaired, affect normal     Labs:   Recent labs in [facility] reviewed by me today.     ASSESSMENT / PLAN:  (N39.0) Urinary tract infection without hematuria, site unspecified  (primary encounter diagnosis)  Comment: 11/4/2201 UC>100,000 E coli. Increased incontinence noted by daughter and staff   Plan: macrobid 100 mg bid for 5 days. Monitor symptoms.   Discussed with her daughter Saba Bruce.     (G30.1,  F02.80) Late onset Alzheimer's disease without behavioral disturbance (H)  (F43.21) Adjustment disorder with depressed mood  Comment: more alert and talkative today. Severe deficits with poor functional status   Plan: Memory Care staff to assist with all cares, meals and med admin     (R13.10) Dysphagia, unspecified type  Comment: due to progressive dementia. She has been on pureed diet in the past but her oral intake declined and she was put back on mechanical soft.   Plan: SPEECH THERAPY referral made 11/4/2021. Continue mechanical soft diet with ground meat.     (I10) Benign essential hypertension  Comment: controlled with BPs: 127/70, 137/97, 129/67  HR: 72-84  Plan: continue amlodipine       Electronically signed by:  CYNDI Glass CNP

## 2021-11-08 NOTE — LETTER
11/8/2021        RE: Zoraida Olivarez  C/o Nava Bruce  [address]  [facility] [address]        [place] [facility]  [place] Medical Record Number:  [id number]  Place of Service where encounter took place[facility] [[id number]]  Chief Complaint   Patient presents with     RECHECK       HPI:    Zoraida Olivarez  is a 87 year old (4/11/1934), who is being seen today for an episodic care visit.  HPI information obtained from: facility chart records, facility staff, patient report, [facility] chart review and family/first contact daughter report. She has lived on Memory Care at this facility since 3/2019. Medical history significant for dementia, COPD, afib,  PVD with bilateral iliac stents 2007, essential tremor, herpes zoster 1/2021, depression, chronic diarrhea, cerebral aneurysm repair in 1980 and on Keppra for seizure prophylaxis.   She was hospitalized at [facility] 2/2019 for E coli UTI with sepsis, afib with RVR and COPD exacerbation. She was on hospice, but improved and discharged from hospice 11/2019.     Today's concern is:     Urinary tract infection without hematuria, site unspecified  Late onset Alzheimer's disease without behavioral disturbance (H)  Adjustment disorder with depressed mood  Dysphagia, unspecified type  Benign essential hypertension   UA/UC was obtained 11/4/2021 due to increased incontinence and grew >100,000 E coli. Afebrile. She is a poor historian, but more interactive than she was on 11/4/2021. Reports feeling well. Denies pain of any type. Up and about in her apt without a device.   Referral was made for home SPEECH THERAPY due to pocketing food and coughing with meals.     Past Medical and Surgical History reviewed in Epic today.    MEDICATIONS:    Current Outpatient Medications   Medication Sig Dispense Refill     acetaminophen (TYLENOL) 325 MG tablet TAKE TWO TABLETS (650MG) BY MOUTH  EVERY 6 HOURS AS NEEDED FOR PAIN RATED 1-4 60 tablet 97     amLODIPine (NORVASC) 5 MG tablet 1 TABLET ORALLY DAILY (DX: HYPERTENSION) 28 tablet 10     ammonium lactate (AMLACTIN) 12 % external cream Apply topically daily as needed for dry skin       ASPIRIN LOW DOSE 81 MG chewable tablet CHEW AND SWALLOW ONE TABLET BY MOUTH ONCE DAILY 28 tablet 97     escitalopram (LEXAPRO) 10 MG tablet TAKE 1 TABLET BY MOUTH EVERY MORNING 28 tablet PRN     estradiol (ESTRACE) 0.5 MG tablet TAKE ONE-HALF TABLET (0.25MG) BY MOUTH ONCE DAILY 14 tablet 97     guaiFENesin (ROBAFEN) 100 MG/5ML SYRP Take 10 mLs by mouth 2 times daily as needed for cough 473 mL 97     ipratropium-albuterol (COMBIVENT RESPIMAT)  MCG/ACT inhaler Inhale 2 puffs into the lungs daily       levETIRAcetam (KEPPRA) 250 MG tablet TAKE 1 TABLET BY MOUTH ONCE DAILY 30 tablet PRN     melatonin 3 MG tablet TAKE 1 TABLET BY MOUTH ONCE DAILY (Patient taking differently: 3 mg At Bedtime ) 30 tablet PRN     polyethylene glycol (MIRALAX) 17 GM/Dose powder MIX 17GM OF POWDER IN 8OZ OF WATER UNTIL COMPLETELY DISSOLVED. DRINK SOLUTION BY MOUTH ONCE DAILY 510 g 97     predniSONE (DELTASONE) 5 MG tablet Take 1.5 tablets (7.5 mg) by mouth daily       sennosides (SENOKOT) 8.6 MG tablet TAKE 1 TABLET BY MOUTH TWICE DAILY AS NEEDED FOR CONSTIPATION 30 tablet 97     Vitamin D3 (CHOLECALCIFEROL) 125 MCG (5000 UT) tablet Take by mouth every other day       White Petrolatum-Mineral Oil (SYSTANE NIGHTTIME) OINT APPLY A THIN RIBBON TO RIGHT LOWER LID TWICE DAILY 3.5 g 97         REVIEW OF SYSTEMS:  Unobtainable secondary to cognitive impairment.     Objective:  /70   Pulse 84   Temp 97.5  F (36.4  C)   Resp 27   Wt 65.1 kg (143 lb 9.6 oz)   BMI 24.65 kg/m    Exam:  GENERAL APPEARANCE:  Alert, in no distress   ENT:  [place], oropharynx clear  EYES:  EOM normal, conjunctiva and lids normal  NECK: no adenopathy,masses or thyromegaly  RESP: decreased breath sounds bilaterally, no  crackles or wheezes   CV:  regular rate and rhythm, no murmur, no edema   ABDOMEN:  soft, non-tender, no distension, no masses  M/S:  gait steady.  Bilateral hand tremor. PORTILLO with good strength. No joint inflammation  SKIN:  no visible rashes or open areas  PSYCH:  oriented to self, insight and judgement impaired, memory impaired, affect normal     Labs:   Recent labs in [facility] reviewed by me today.     ASSESSMENT / PLAN:  (N39.0) Urinary tract infection without hematuria, site unspecified  (primary encounter diagnosis)  Comment: 11/4/2201 UC>100,000 E coli. Increased incontinence noted by daughter and staff   Plan: macrobid 100 mg bid for 5 days. Monitor symptoms.   Discussed with her daughter Saba Bruce.     (G30.1,  F02.80) Late onset Alzheimer's disease without behavioral disturbance (H)  (F43.21) Adjustment disorder with depressed mood  Comment: more alert and talkative today. Severe deficits with poor functional status   Plan: Memory Care staff to assist with all cares, meals and med admin     (R13.10) Dysphagia, unspecified type  Comment: due to progressive dementia. She has been on pureed diet in the past but her oral intake declined and she was put back on mechanical soft.   Plan: SPEECH THERAPY referral made 11/4/2021. Continue mechanical soft diet with ground meat.     (I10) Benign essential hypertension  Comment: controlled with BPs: 127/70, 137/97, 129/67  HR: 72-84  Plan: continue amlodipine       Electronically signed by:  CYNDI Glass CNP                 Sincerely,        CYNDI Glass CNP

## 2021-11-14 ENCOUNTER — LAB REQUISITION (OUTPATIENT)
Dept: LAB | Facility: CLINIC | Age: 86
End: 2021-11-14
Payer: MEDICARE

## 2021-11-14 DIAGNOSIS — I10 ESSENTIAL (PRIMARY) HYPERTENSION: ICD-10-CM

## 2021-11-14 DIAGNOSIS — E55.9 VITAMIN D DEFICIENCY, UNSPECIFIED: ICD-10-CM

## 2021-11-14 DIAGNOSIS — E53.8 DEFICIENCY OF OTHER SPECIFIED B GROUP VITAMINS: ICD-10-CM

## 2021-11-16 LAB
ANION GAP SERPL CALCULATED.3IONS-SCNC: 13 MMOL/L (ref 5–18)
BUN SERPL-MCNC: 15 MG/DL (ref 8–28)
CALCIUM SERPL-MCNC: 9.6 MG/DL (ref 8.5–10.5)
CHLORIDE BLD-SCNC: 105 MMOL/L (ref 98–107)
CO2 SERPL-SCNC: 24 MMOL/L (ref 22–31)
CREAT SERPL-MCNC: 0.84 MG/DL (ref 0.6–1.1)
ERYTHROCYTE [DISTWIDTH] IN BLOOD BY AUTOMATED COUNT: 14 % (ref 10–15)
GFR SERPL CREATININE-BSD FRML MDRD: 63 ML/MIN/1.73M2
GLUCOSE BLD-MCNC: 78 MG/DL (ref 70–125)
HCT VFR BLD AUTO: 52.4 % (ref 35–47)
HGB BLD-MCNC: 17 G/DL (ref 11.7–15.7)
MCH RBC QN AUTO: 30.1 PG (ref 26.5–33)
MCHC RBC AUTO-ENTMCNC: 32.4 G/DL (ref 31.5–36.5)
MCV RBC AUTO: 93 FL (ref 78–100)
PLATELET # BLD AUTO: 486 10E3/UL (ref 150–450)
POTASSIUM BLD-SCNC: 4.3 MMOL/L (ref 3.5–5)
RBC # BLD AUTO: 5.65 10E6/UL (ref 3.8–5.2)
SODIUM SERPL-SCNC: 142 MMOL/L (ref 136–145)
WBC # BLD AUTO: 11.6 10E3/UL (ref 4–11)

## 2021-11-16 PROCEDURE — P9603 ONE-WAY ALLOW PRORATED MILES: HCPCS | Mod: ORL | Performed by: NURSE PRACTITIONER

## 2021-11-16 PROCEDURE — 82306 VITAMIN D 25 HYDROXY: CPT | Mod: ORL | Performed by: NURSE PRACTITIONER

## 2021-11-16 PROCEDURE — 85027 COMPLETE CBC AUTOMATED: CPT | Mod: ORL | Performed by: NURSE PRACTITIONER

## 2021-11-16 PROCEDURE — 80048 BASIC METABOLIC PNL TOTAL CA: CPT | Mod: ORL | Performed by: NURSE PRACTITIONER

## 2021-11-16 PROCEDURE — 36415 COLL VENOUS BLD VENIPUNCTURE: CPT | Mod: ORL | Performed by: NURSE PRACTITIONER

## 2021-11-17 LAB — DEPRECATED CALCIDIOL+CALCIFEROL SERPL-MC: 60 UG/L (ref 30–80)

## 2021-11-18 PROCEDURE — U0003 INFECTIOUS AGENT DETECTION BY NUCLEIC ACID (DNA OR RNA); SEVERE ACUTE RESPIRATORY SYNDROME CORONAVIRUS 2 (SARS-COV-2) (CORONAVIRUS DISEASE [COVID-19]), AMPLIFIED PROBE TECHNIQUE, MAKING USE OF HIGH THROUGHPUT TECHNOLOGIES AS DESCRIBED BY CMS-2020-01-R: HCPCS | Mod: ORL | Performed by: NURSE PRACTITIONER

## 2021-11-19 ENCOUNTER — LAB REQUISITION (OUTPATIENT)
Dept: LAB | Facility: CLINIC | Age: 86
End: 2021-11-19
Payer: MEDICARE

## 2021-11-19 DIAGNOSIS — U07.1 COVID-19: ICD-10-CM

## 2021-11-20 LAB — SARS-COV-2 RNA RESP QL NAA+PROBE: NEGATIVE

## 2022-01-01 ENCOUNTER — ASSISTED LIVING VISIT (OUTPATIENT)
Dept: GERIATRICS | Facility: CLINIC | Age: 87
End: 2022-01-01
Payer: OTHER MISCELLANEOUS

## 2022-01-01 ENCOUNTER — ASSISTED LIVING VISIT (OUTPATIENT)
Dept: GERIATRICS | Facility: CLINIC | Age: 87
End: 2022-01-01
Payer: MEDICARE

## 2022-01-01 ENCOUNTER — LAB REQUISITION (OUTPATIENT)
Dept: LAB | Facility: CLINIC | Age: 87
End: 2022-01-01
Payer: MEDICARE

## 2022-01-01 ENCOUNTER — MEDICAL CORRESPONDENCE (OUTPATIENT)
Dept: HEALTH INFORMATION MANAGEMENT | Facility: CLINIC | Age: 87
End: 2022-01-01

## 2022-01-01 ENCOUNTER — TELEPHONE (OUTPATIENT)
Dept: GERIATRICS | Facility: CLINIC | Age: 87
End: 2022-01-01

## 2022-01-01 ENCOUNTER — HEALTH MAINTENANCE LETTER (OUTPATIENT)
Age: 87
End: 2022-01-01

## 2022-01-01 VITALS
WEIGHT: 122.8 LBS | BODY MASS INDEX: 21.08 KG/M2 | SYSTOLIC BLOOD PRESSURE: 139 MMHG | TEMPERATURE: 97.3 F | DIASTOLIC BLOOD PRESSURE: 83 MMHG | HEART RATE: 66 BPM | RESPIRATION RATE: 22 BRPM

## 2022-01-01 VITALS
OXYGEN SATURATION: 97 % | RESPIRATION RATE: 24 BRPM | WEIGHT: 112 LBS | BODY MASS INDEX: 19.12 KG/M2 | DIASTOLIC BLOOD PRESSURE: 62 MMHG | SYSTOLIC BLOOD PRESSURE: 107 MMHG | HEART RATE: 70 BPM | TEMPERATURE: 97.4 F | HEIGHT: 64 IN

## 2022-01-01 VITALS
HEART RATE: 67 BPM | WEIGHT: 125.6 LBS | OXYGEN SATURATION: 95 % | BODY MASS INDEX: 21.44 KG/M2 | RESPIRATION RATE: 24 BRPM | HEIGHT: 64 IN | TEMPERATURE: 97 F | DIASTOLIC BLOOD PRESSURE: 98 MMHG | SYSTOLIC BLOOD PRESSURE: 151 MMHG

## 2022-01-01 VITALS
DIASTOLIC BLOOD PRESSURE: 70 MMHG | OXYGEN SATURATION: 91 % | RESPIRATION RATE: 20 BRPM | WEIGHT: 128 LBS | SYSTOLIC BLOOD PRESSURE: 130 MMHG | BODY MASS INDEX: 21.85 KG/M2 | HEIGHT: 64 IN | TEMPERATURE: 97.3 F | HEART RATE: 64 BPM

## 2022-01-01 VITALS
BODY MASS INDEX: 19.84 KG/M2 | SYSTOLIC BLOOD PRESSURE: 165 MMHG | HEART RATE: 71 BPM | WEIGHT: 115.6 LBS | DIASTOLIC BLOOD PRESSURE: 90 MMHG | TEMPERATURE: 97.2 F | RESPIRATION RATE: 18 BRPM

## 2022-01-01 VITALS
BODY MASS INDEX: 22.52 KG/M2 | RESPIRATION RATE: 17 BRPM | WEIGHT: 131.2 LBS | HEART RATE: 75 BPM | DIASTOLIC BLOOD PRESSURE: 87 MMHG | TEMPERATURE: 96.6 F | SYSTOLIC BLOOD PRESSURE: 146 MMHG

## 2022-01-01 VITALS
DIASTOLIC BLOOD PRESSURE: 68 MMHG | RESPIRATION RATE: 32 BRPM | SYSTOLIC BLOOD PRESSURE: 131 MMHG | HEIGHT: 64 IN | BODY MASS INDEX: 20.83 KG/M2 | TEMPERATURE: 96.8 F | WEIGHT: 122 LBS | HEART RATE: 68 BPM | OXYGEN SATURATION: 92 %

## 2022-01-01 VITALS
RESPIRATION RATE: 20 BRPM | WEIGHT: 133 LBS | DIASTOLIC BLOOD PRESSURE: 74 MMHG | TEMPERATURE: 97.1 F | SYSTOLIC BLOOD PRESSURE: 143 MMHG | BODY MASS INDEX: 22.83 KG/M2 | HEART RATE: 69 BPM

## 2022-01-01 VITALS
HEART RATE: 69 BPM | BODY MASS INDEX: 23.45 KG/M2 | DIASTOLIC BLOOD PRESSURE: 72 MMHG | SYSTOLIC BLOOD PRESSURE: 129 MMHG | WEIGHT: 136.6 LBS | RESPIRATION RATE: 18 BRPM | TEMPERATURE: 97.6 F

## 2022-01-01 DIAGNOSIS — J44.9 CHRONIC OBSTRUCTIVE PULMONARY DISEASE, UNSPECIFIED COPD TYPE (H): ICD-10-CM

## 2022-01-01 DIAGNOSIS — F03.90 DEMENTIA WITHOUT BEHAVIORAL DISTURBANCE, UNSPECIFIED DEMENTIA TYPE: ICD-10-CM

## 2022-01-01 DIAGNOSIS — U07.1 COVID-19: ICD-10-CM

## 2022-01-01 DIAGNOSIS — I10 BENIGN ESSENTIAL HYPERTENSION: ICD-10-CM

## 2022-01-01 DIAGNOSIS — F32.4 MAJOR DEPRESSIVE DISORDER IN PARTIAL REMISSION, UNSPECIFIED WHETHER RECURRENT (H): ICD-10-CM

## 2022-01-01 DIAGNOSIS — R63.4 WEIGHT LOSS: ICD-10-CM

## 2022-01-01 DIAGNOSIS — E55.9 VITAMIN D DEFICIENCY: ICD-10-CM

## 2022-01-01 DIAGNOSIS — Z51.5 HOSPICE CARE PATIENT: ICD-10-CM

## 2022-01-01 DIAGNOSIS — F43.21 ADJUSTMENT DISORDER WITH DEPRESSED MOOD: ICD-10-CM

## 2022-01-01 DIAGNOSIS — N18.31 STAGE 3A CHRONIC KIDNEY DISEASE (H): ICD-10-CM

## 2022-01-01 DIAGNOSIS — F02.80 LATE ONSET ALZHEIMER'S DISEASE WITHOUT BEHAVIORAL DISTURBANCE (H): Primary | ICD-10-CM

## 2022-01-01 DIAGNOSIS — N39.0 URINARY TRACT INFECTION WITHOUT HEMATURIA, SITE UNSPECIFIED: ICD-10-CM

## 2022-01-01 DIAGNOSIS — J44.9 CHRONIC OBSTRUCTIVE PULMONARY DISEASE, UNSPECIFIED COPD TYPE (H): Primary | ICD-10-CM

## 2022-01-01 DIAGNOSIS — I48.0 PAF (PAROXYSMAL ATRIAL FIBRILLATION) (H): ICD-10-CM

## 2022-01-01 DIAGNOSIS — G30.1 LATE ONSET ALZHEIMER'S DISEASE WITHOUT BEHAVIORAL DISTURBANCE (H): ICD-10-CM

## 2022-01-01 DIAGNOSIS — I73.9 PVD (PERIPHERAL VASCULAR DISEASE) (H): ICD-10-CM

## 2022-01-01 DIAGNOSIS — R13.10 DYSPHAGIA, UNSPECIFIED TYPE: ICD-10-CM

## 2022-01-01 DIAGNOSIS — G30.1 LATE ONSET ALZHEIMER'S DISEASE WITHOUT BEHAVIORAL DISTURBANCE (H): Primary | ICD-10-CM

## 2022-01-01 DIAGNOSIS — Z78.0 MENOPAUSE: ICD-10-CM

## 2022-01-01 DIAGNOSIS — E46 PROTEIN-CALORIE MALNUTRITION, UNSPECIFIED SEVERITY (H): ICD-10-CM

## 2022-01-01 DIAGNOSIS — G47.00 INSOMNIA, UNSPECIFIED TYPE: ICD-10-CM

## 2022-01-01 DIAGNOSIS — Z86.79 HX OF ANEURYSM: ICD-10-CM

## 2022-01-01 DIAGNOSIS — F02.80 LATE ONSET ALZHEIMER'S DISEASE WITHOUT BEHAVIORAL DISTURBANCE (H): ICD-10-CM

## 2022-01-01 DIAGNOSIS — F32.4 MAJOR DEPRESSIVE DISORDER IN PARTIAL REMISSION, UNSPECIFIED WHETHER RECURRENT (H): Primary | ICD-10-CM

## 2022-01-01 LAB
SARS-COV-2 RNA RESP QL NAA+PROBE: NEGATIVE

## 2022-01-01 PROCEDURE — U0003 INFECTIOUS AGENT DETECTION BY NUCLEIC ACID (DNA OR RNA); SEVERE ACUTE RESPIRATORY SYNDROME CORONAVIRUS 2 (SARS-COV-2) (CORONAVIRUS DISEASE [COVID-19]), AMPLIFIED PROBE TECHNIQUE, MAKING USE OF HIGH THROUGHPUT TECHNOLOGIES AS DESCRIBED BY CMS-2020-01-R: HCPCS | Mod: ORL | Performed by: NURSE PRACTITIONER

## 2022-01-01 PROCEDURE — U0005 INFEC AGEN DETEC AMPLI PROBE: HCPCS | Mod: ORL | Performed by: NURSE PRACTITIONER

## 2022-01-01 RX ORDER — MORPHINE SULFATE 30 MG/1
2.5 TABLET ORAL
COMMUNITY

## 2022-01-01 RX ORDER — LANOLIN ALCOHOL/MO/W.PET/CERES
3 CREAM (GRAM) TOPICAL AT BEDTIME
Qty: 30 TABLET | Refills: 11 | Status: SHIPPED | OUTPATIENT
Start: 2022-01-01 | End: 2022-01-01

## 2022-01-01 RX ORDER — BISACODYL 10 MG
10 SUPPOSITORY, RECTAL RECTAL 2 TIMES DAILY PRN
COMMUNITY

## 2022-01-01 RX ORDER — ASPIRIN 81 MG
TABLET,CHEWABLE ORAL
Qty: 30 TABLET | Refills: 11 | Status: SHIPPED | OUTPATIENT
Start: 2022-01-01 | End: 2023-01-24

## 2022-01-01 RX ORDER — PREDNISONE 2.5 MG/1
TABLET ORAL
Qty: 84 TABLET | Refills: 10 | Status: SHIPPED | OUTPATIENT
Start: 2022-01-01 | End: 2022-01-01

## 2022-01-01 RX ORDER — PREDNISONE 2.5 MG/1
TABLET ORAL
Qty: 90 TABLET | Refills: 11 | Status: SHIPPED | OUTPATIENT
Start: 2022-01-01 | End: 2023-01-24

## 2022-01-01 RX ORDER — HALOPERIDOL 0.5 MG/1
0.5 TABLET ORAL EVERY 6 HOURS PRN
COMMUNITY
End: 2022-01-01

## 2022-01-01 RX ORDER — HALOPERIDOL 0.5 MG/1
0.5 TABLET ORAL EVERY 6 HOURS PRN
COMMUNITY

## 2022-01-01 RX ORDER — ESCITALOPRAM OXALATE 10 MG/1
TABLET ORAL
Qty: 28 TABLET | Refills: 11 | Status: SHIPPED | OUTPATIENT
Start: 2022-01-01 | End: 2023-01-24

## 2022-01-01 RX ORDER — TRAZODONE HYDROCHLORIDE 50 MG/1
25 TABLET, FILM COATED ORAL AT BEDTIME
Qty: 30 TABLET | Refills: 4 | Status: SHIPPED | OUTPATIENT
Start: 2022-01-01 | End: 2023-01-24

## 2022-01-01 RX ORDER — ASPIRIN 81 MG
TABLET,CHEWABLE ORAL
Qty: 15 TABLET | Refills: 11 | Status: SHIPPED | OUTPATIENT
Start: 2022-01-01 | End: 2022-01-01

## 2022-01-01 RX ORDER — ESTRADIOL 0.5 MG/1
TABLET ORAL
Qty: 14 TABLET | Refills: 11 | Status: SHIPPED | OUTPATIENT
Start: 2022-01-01 | End: 2022-01-01

## 2022-01-01 RX ORDER — AMLODIPINE BESYLATE 5 MG/1
TABLET ORAL
Qty: 90 TABLET | Refills: 3 | Status: SHIPPED | OUTPATIENT
Start: 2022-01-01 | End: 2023-01-24

## 2022-01-01 RX ORDER — LANOLIN ALCOHOL/MO/W.PET/CERES
CREAM (GRAM) TOPICAL
Qty: 28 TABLET | Refills: 11 | Status: SHIPPED | OUTPATIENT
Start: 2022-01-01 | End: 2023-01-24

## 2022-01-01 RX ORDER — IPRATROPIUM BROMIDE AND ALBUTEROL 20; 100 UG/1; UG/1
SPRAY, METERED RESPIRATORY (INHALATION)
Qty: 4 G | Refills: 11 | Status: SHIPPED | OUTPATIENT
Start: 2022-01-01 | End: 2023-01-24

## 2022-01-01 RX ORDER — HALOPERIDOL 0.5 MG/1
TABLET ORAL
Qty: 30 TABLET | Refills: 11 | Status: SHIPPED | OUTPATIENT
Start: 2022-01-01 | End: 2022-01-01

## 2022-01-01 RX ORDER — LORAZEPAM 0.5 MG/1
0.5 TABLET ORAL EVERY 4 HOURS PRN
COMMUNITY

## 2022-01-01 RX ORDER — LEVETIRACETAM 250 MG/1
TABLET ORAL
Qty: 28 TABLET | Refills: 11 | Status: SHIPPED | OUTPATIENT
Start: 2022-01-01 | End: 2023-01-24

## 2022-01-01 RX ORDER — ASPIRIN 81 MG
TABLET,CHEWABLE ORAL
Qty: 28 TABLET | Refills: 11 | Status: SHIPPED | OUTPATIENT
Start: 2022-01-01 | End: 2022-01-01

## 2022-01-11 VITALS
BODY MASS INDEX: 23.14 KG/M2 | TEMPERATURE: 97.6 F | HEART RATE: 60 BPM | RESPIRATION RATE: 20 BRPM | DIASTOLIC BLOOD PRESSURE: 77 MMHG | WEIGHT: 134.8 LBS | SYSTOLIC BLOOD PRESSURE: 157 MMHG

## 2022-01-12 ENCOUNTER — ASSISTED LIVING VISIT (OUTPATIENT)
Dept: GERIATRICS | Facility: CLINIC | Age: 87
End: 2022-01-12
Payer: MEDICARE

## 2022-01-12 DIAGNOSIS — G30.1 LATE ONSET ALZHEIMER'S DISEASE WITHOUT BEHAVIORAL DISTURBANCE (H): Primary | ICD-10-CM

## 2022-01-12 DIAGNOSIS — F02.80 LATE ONSET ALZHEIMER'S DISEASE WITHOUT BEHAVIORAL DISTURBANCE (H): Primary | ICD-10-CM

## 2022-01-12 DIAGNOSIS — I73.9 PVD (PERIPHERAL VASCULAR DISEASE) (H): ICD-10-CM

## 2022-01-12 DIAGNOSIS — I48.0 PAF (PAROXYSMAL ATRIAL FIBRILLATION) (H): ICD-10-CM

## 2022-01-12 DIAGNOSIS — J44.9 CHRONIC OBSTRUCTIVE PULMONARY DISEASE, UNSPECIFIED COPD TYPE (H): ICD-10-CM

## 2022-01-12 DIAGNOSIS — R13.10 DYSPHAGIA, UNSPECIFIED TYPE: ICD-10-CM

## 2022-01-12 DIAGNOSIS — N18.31 STAGE 3A CHRONIC KIDNEY DISEASE (H): ICD-10-CM

## 2022-01-12 DIAGNOSIS — F43.21 ADJUSTMENT DISORDER WITH DEPRESSED MOOD: ICD-10-CM

## 2022-01-12 DIAGNOSIS — N39.0 URINARY TRACT INFECTION WITHOUT HEMATURIA, SITE UNSPECIFIED: ICD-10-CM

## 2022-01-12 NOTE — LETTER
1/12/2022        RE: Zoraida Olivarez  C/o Nava Teo  [address] [facility] [address]        [place] [facility]  [place] Medical Record Number:  [id number]  Place of Service where encounter took place:  [facility] [place] [facility] [[id number]]  Chief Complaint   Patient presents with     RECHECK       HPI:    Zoraida Olivarez  is a 87 year old (4/11/1934), who is being seen today for an episodic care visit.  HPI information obtained from: facility chart records, facility staff, patient report, [facility] chart review and family/first contact daughter report.  She has lived on Memory Care at this facility since 3/2019. Medical history significant for dementia, COPD, afib,  PVD with bilateral iliac stents 2007, essential tremor, herpes zoster 1/2021, depression, chronic diarrhea, cerebral aneurysm repair in 1980 and on Keppra for seizure prophylaxis.   She was hospitalized at [facility] 2/2019 for E coli UTI with sepsis, afib with RVR and COPD exacerbation. She was on hospice, but improved and discharged from hospice 11/2019.        Today's concern is:     Late onset Alzheimer's disease without behavioral disturbance (H)  Dysphagia, unspecified type  Adjustment disorder with depressed mood  PAF (paroxysmal atrial fibrillation) (H)  Chronic obstructive pulmonary disease, unspecified COPD type (H)  PVD (peripheral vascular disease) (H)  Stage 3a chronic kidney disease (H)  Urinary tract infection without hematuria, site unspecified   She is seen today per nursing request due to poor appetite and refusing meds and cares. She has been isolating in her room more frequently. Family has requested a hospice referral.   She is in her room, resting on the couch. Opens her eyes, but doesn't respond verbally. Ambulates without a device. Requires supervision to max assist with cares.   She was treated for an E coli UTI in 11/2021.     Past Medical and  Surgical History reviewed in Epic today.    MEDICATIONS:    Current Outpatient Medications   Medication Sig Dispense Refill     acetaminophen (TYLENOL) 325 MG tablet TAKE TWO TABLETS (650MG) BY MOUTH EVERY 6 HOURS AS NEEDED FOR PAIN RATED 1-4 60 tablet 97     amLODIPine (NORVASC) 5 MG tablet 1 TABLET ORALLY DAILY (DX: HYPERTENSION) 28 tablet 10     ammonium lactate (AMLACTIN) 12 % external cream Apply topically daily as needed for dry skin       ASPIRIN LOW DOSE 81 MG chewable tablet CHEW AND SWALLOW ONE TABLET BY MOUTH ONCE DAILY 28 tablet 97     escitalopram (LEXAPRO) 10 MG tablet TAKE 1 TABLET BY MOUTH EVERY MORNING 28 tablet PRN     estradiol (ESTRACE) 0.5 MG tablet TAKE ONE-HALF TABLET (0.25MG) BY MOUTH ONCE DAILY 14 tablet 97     guaiFENesin (ROBAFEN) 100 MG/5ML SYRP Take 10 mLs by mouth 2 times daily as needed for cough 473 mL 97     ipratropium-albuterol (COMBIVENT RESPIMAT)  MCG/ACT inhaler Inhale 2 puffs into the lungs daily       levETIRAcetam (KEPPRA) 250 MG tablet TAKE 1 TABLET BY MOUTH ONCE DAILY 30 tablet PRN     melatonin 3 MG tablet TAKE 1 TABLET BY MOUTH ONCE DAILY (Patient taking differently: 3 mg At Bedtime ) 30 tablet PRN     polyethylene glycol (MIRALAX) 17 GM/Dose powder MIX 17GM OF POWDER IN 8OZ OF WATER UNTIL COMPLETELY DISSOLVED. DRINK SOLUTION BY MOUTH ONCE DAILY 510 g 97     predniSONE (DELTASONE) 5 MG tablet Take 1.5 tablets (7.5 mg) by mouth daily       sennosides (SENOKOT) 8.6 MG tablet TAKE 1 TABLET BY MOUTH TWICE DAILY AS NEEDED FOR CONSTIPATION 30 tablet 97     Vitamin D3 (CHOLECALCIFEROL) 125 MCG (5000 UT) tablet Take by mouth every other day       White Petrolatum-Mineral Oil (SYSTANE NIGHTTIME) OINT APPLY A THIN RIBBON TO RIGHT LOWER LID TWICE DAILY 3.5 g 97         REVIEW OF SYSTEMS:  Unobtainable secondary to cognitive impairment.     Objective:  BP (!) 157/77   Pulse 60   Temp 97.6  F (36.4  C)   Resp 20   Wt 61.1 kg (134 lb 12.8 oz)   BMI 23.14 kg/m     Exam:  GENERAL APPEARANCE:  Alert, in no distress   ENT:  [place], oropharynx clear  EYES:  EOM normal, conjunctiva and lids normal  NECK: no adenopathy,masses or thyromegaly  RESP: decreased breath sounds bilaterally, no crackles or wheezes   CV:  regular rate and rhythm, no murmur, no edema   ABDOMEN:  soft, non-tender, no distension, no masses  M/S:  resting on the couch.  Bilateral hand tremor. PORTILLO with good strength. No joint inflammation  SKIN:  no visible rashes or open areas  PSYCH:  oriented to self, insight and judgement impaired, memory impaired, flat affect      Labs:   Recent labs in [facility] reviewed by me today.     ASSESSMENT / PLAN:  (G30.1,  F02.80) Late onset Alzheimer's disease without behavioral disturbance (H)  (primary encounter diagnosis)  (R13.10) Dysphagia, unspecified type  (F43.21) Adjustment disorder with depressed mood  Comment: severe deficits with poor functional status. Increased refusal of cares and meds. Weight is down 4-8 lbs in the past 2 months. Recently worked with SPEECH THERAPY for dysphagia.   SLUMS 1/30 and ACL 3.8 in 2019.   Plan: discussed options for management with her daughter Saba Bruce, including a trial of mirtazapine for appetite stimulation. Family would like a hospice consult prior to considering med changes. Will refer to hospice of their choice.   Discussed with nursing supervisor.     (I48.0) PAF (paroxysmal atrial fibrillation) (H)  Comment: not requiring rate control with HR: 60-83   Plan: continue ASA    (J44.9) Chronic obstructive pulmonary disease, unspecified COPD type (H)  Comment: no s/s of exacerbation   Plan: continue prednisone, Combivent, guaifenesin prn.     (I73.9) PVD (peripheral vascular disease) (H)  Comment: no LE edema or skin breakdown   Plan: monitor     (N18.31) Stage 3a chronic kidney disease (H)  Comment: renal function at baseline with creatinine 0.84 on 11/16/2021  Plan: follow labs prn. Avoid nephrotoxins     (N39.0) Urinary tract  infection without hematuria, site unspecified  Comment: E coli UTI in 2021, treated with macrobid. She does not appear acutely ill   Plan: monitor symptoms       Electronically signed by:  CYNDI Glass CNP [id number]      Orders 2021:  1. Refer to hospice of family's choice for progressive dementia with weight loss.       CYNDI Glass CNP on 2022 at 2:41 PM          Sincerely,        CYNDI Glass CNP

## 2022-01-12 NOTE — PROGRESS NOTES
[place] [facility]  [place] Medical Record Number:  [id number]  Place of Service where encounter took place:  [facility] ([facility]) [[id number]]  Chief Complaint   Patient presents with     RECHECK       HPI:    Zoraida Olivarez  is a 87 year old (4/11/1934), who is being seen today for an episodic care visit.  HPI information obtained from: facility chart records, facility staff, patient report, [facility] chart review and family/first contact daughter report.  She has lived on Memory Care at this facility since 3/2019. Medical history significant for dementia, COPD, afib,  PVD with bilateral iliac stents 2007, essential tremor, herpes zoster 1/2021, depression, chronic diarrhea, cerebral aneurysm repair in 1980 and on Keppra for seizure prophylaxis.   She was hospitalized at [facility] 2/2019 for E coli UTI with sepsis, afib with RVR and COPD exacerbation. She was on hospice, but improved and discharged from hospice 11/2019.        Today's concern is:     Late onset Alzheimer's disease without behavioral disturbance (H)  Dysphagia, unspecified type  Adjustment disorder with depressed mood  PAF (paroxysmal atrial fibrillation) (H)  Chronic obstructive pulmonary disease, unspecified COPD type (H)  PVD (peripheral vascular disease) (H)  Stage 3a chronic kidney disease (H)  Urinary tract infection without hematuria, site unspecified   She is seen today per nursing request due to poor appetite and refusing meds and cares. She has been isolating in her room more frequently. Family has requested a hospice referral.   She is in her room, resting on the couch. Opens her eyes, but doesn't respond verbally. Ambulates without a device. Requires supervision to max assist with cares.   She was treated for an E coli UTI in 11/2021.     Past Medical and Surgical History reviewed in Epic today.    MEDICATIONS:    Current Outpatient Medications   Medication Sig  Dispense Refill     acetaminophen (TYLENOL) 325 MG tablet TAKE TWO TABLETS (650MG) BY MOUTH EVERY 6 HOURS AS NEEDED FOR PAIN RATED 1-4 60 tablet 97     amLODIPine (NORVASC) 5 MG tablet 1 TABLET ORALLY DAILY (DX: HYPERTENSION) 28 tablet 10     ammonium lactate (AMLACTIN) 12 % external cream Apply topically daily as needed for dry skin       ASPIRIN LOW DOSE 81 MG chewable tablet CHEW AND SWALLOW ONE TABLET BY MOUTH ONCE DAILY 28 tablet 97     escitalopram (LEXAPRO) 10 MG tablet TAKE 1 TABLET BY MOUTH EVERY MORNING 28 tablet PRN     estradiol (ESTRACE) 0.5 MG tablet TAKE ONE-HALF TABLET (0.25MG) BY MOUTH ONCE DAILY 14 tablet 97     guaiFENesin (ROBAFEN) 100 MG/5ML SYRP Take 10 mLs by mouth 2 times daily as needed for cough 473 mL 97     ipratropium-albuterol (COMBIVENT RESPIMAT)  MCG/ACT inhaler Inhale 2 puffs into the lungs daily       levETIRAcetam (KEPPRA) 250 MG tablet TAKE 1 TABLET BY MOUTH ONCE DAILY 30 tablet PRN     melatonin 3 MG tablet TAKE 1 TABLET BY MOUTH ONCE DAILY (Patient taking differently: 3 mg At Bedtime ) 30 tablet PRN     polyethylene glycol (MIRALAX) 17 GM/Dose powder MIX 17GM OF POWDER IN 8OZ OF WATER UNTIL COMPLETELY DISSOLVED. DRINK SOLUTION BY MOUTH ONCE DAILY 510 g 97     predniSONE (DELTASONE) 5 MG tablet Take 1.5 tablets (7.5 mg) by mouth daily       sennosides (SENOKOT) 8.6 MG tablet TAKE 1 TABLET BY MOUTH TWICE DAILY AS NEEDED FOR CONSTIPATION 30 tablet 97     Vitamin D3 (CHOLECALCIFEROL) 125 MCG (5000 UT) tablet Take by mouth every other day       White Petrolatum-Mineral Oil (SYSTANE NIGHTTIME) OINT APPLY A THIN RIBBON TO RIGHT LOWER LID TWICE DAILY 3.5 g 97         REVIEW OF SYSTEMS:  Unobtainable secondary to cognitive impairment.     Objective:  BP (!) 157/77   Pulse 60   Temp 97.6  F (36.4  C)   Resp 20   Wt 61.1 kg (134 lb 12.8 oz)   BMI 23.14 kg/m    Exam:  GENERAL APPEARANCE:  Alert, in no distress   ENT:  [place], oropharynx clear  EYES:  EOM normal, conjunctiva and lids  normal  NECK: no adenopathy,masses or thyromegaly  RESP: decreased breath sounds bilaterally, no crackles or wheezes   CV:  regular rate and rhythm, no murmur, no edema   ABDOMEN:  soft, non-tender, no distension, no masses  M/S:  resting on the couch.  Bilateral hand tremor. PORTILLO with good strength. No joint inflammation  SKIN:  no visible rashes or open areas  PSYCH:  oriented to self, insight and judgement impaired, memory impaired, flat affect      Labs:   Recent labs in [facility] reviewed by me today.     ASSESSMENT / PLAN:  (G30.1,  F02.80) Late onset Alzheimer's disease without behavioral disturbance (H)  (primary encounter diagnosis)  (R13.10) Dysphagia, unspecified type  (F43.21) Adjustment disorder with depressed mood  Comment: severe deficits with poor functional status. Increased refusal of cares and meds. Weight is down 4-8 lbs in the past 2 months. Recently worked with SPEECH THERAPY for dysphagia.   SLUMS 1/30 and ACL 3.8 in 2019.   Plan: discussed options for management with her daughter Saba Bruce, including a trial of mirtazapine for appetite stimulation. Family would like a hospice consult prior to considering med changes. Will refer to hospice of their choice.   Discussed with nursing supervisor.     (I48.0) PAF (paroxysmal atrial fibrillation) (H)  Comment: not requiring rate control with HR: 60-83   Plan: continue ASA    (J44.9) Chronic obstructive pulmonary disease, unspecified COPD type (H)  Comment: no s/s of exacerbation   Plan: continue prednisone, Combivent, guaifenesin prn.     (I73.9) PVD (peripheral vascular disease) (H)  Comment: no LE edema or skin breakdown   Plan: monitor     (N18.31) Stage 3a chronic kidney disease (H)  Comment: renal function at baseline with creatinine 0.84 on 11/16/2021  Plan: follow labs prn. Avoid nephrotoxins     (N39.0) Urinary tract infection without hematuria, site unspecified  Comment: E coli UTI in 11/2021, treated with macrobid. She does not appear  acutely ill   Plan: monitor symptoms       Electronically signed by:  CYNDI Glass CNP

## 2022-01-12 NOTE — PROGRESS NOTES
Zoraida Olivarez   1934      Orders 2021:  1. Refer to hospice of family's choice for progressive dementia with weight loss.       CYNDI Glass CNP on 2022 at 2:41 PM

## 2022-02-28 NOTE — LETTER
2/28/2022        RE: Zoraida Olivarez  C/o Nava Teo  [address]  [facility] [address]        [facility]    Chief Complaint   Patient presents with     RECHECK     HPI:  Zoraida Olivarez is a 87 year old  (4/11/1934), who is being seen today for an episodic care visit at: [facility] (S) [[id number]].   She has lived on Memory Care at this facility since 3/2019. Medical history significant for dementia, COPD, afib,  PVD with bilateral iliac stents 2007, essential tremor, herpes zoster 1/2021, depression, chronic diarrhea, cerebral aneurysm repair in 1980 and on Keppra for seizure prophylaxis.   She was hospitalized at [facility] 2/2019 for E coli UTI with sepsis, afib with RVR and COPD exacerbation. She was on hospice, but improved and discharged from hospice 11/2019.        Today's concern is:      Late onset Alzheimer's disease without behavioral disturbance (H)  Adjustment disorder with depressed mood  Urinary tract infection without hematuria, site unspecified  PAF (paroxysmal atrial fibrillation) (H)  Chronic obstructive pulmonary disease, unspecified COPD type (H)  Stage 3a chronic kidney disease (H)  Dysphagia, unspecified type  Benign essential hypertension  She is seen today after her daughter took her to the [facility] ED 2/26/2022 for confusion and concern for UTI. UA was indicative of infection and she returned with cephalexin for 5 days. UC grew >100,000 E coli.   She is resting in bed and minimally interactive. Opens her eyes, but does not respond verbally. Nurses report she has been her usual self. Appetite is fair. Ambulates without a device. Requires supervision to max assist with cares.     Allergies, and PMH/PSH reviewed in EPIC today    REVIEW OF SYSTEMS:  Unobtainable secondary to cognitive impairment.     Objective:   /72   Pulse 69   Temp 97.6  F (36.4  C)   Resp 18   Wt 62 kg (136 lb 9.6  oz)   BMI 23.45 kg/m    GENERAL APPEARANCE:  Alert, in no distress   ENT:  [place], oropharynx clear  EYES:  EOM normal, conjunctiva and lids normal  NECK: no adenopathy,masses or thyromegaly  RESP: decreased breath sounds bilaterally, no crackles or wheezes   CV:  regular rate and rhythm, no murmur, no edema   ABDOMEN:  soft, non-tender, no distension, no masses  M/S:  in bed. Bilateral hand tremor. PORTILLO with good strength. No joint inflammation  SKIN:  no visible rashes or open areas  PSYCH:  oriented to self, insight and judgement impaired, memory impaired, flat affect          Recent labs in EPIC reviewed by me today.     ASSESSMENT / PLAN:  (G30.1,  F02.80) Late onset Alzheimer's disease without behavioral disturbance (H)  (primary encounter diagnosis)  (F43.21) Adjustment disorder with depressed mood  Comment: severe deficits with low functional status. Weight stable at 136 lbs. She was evaluated by hospice 1/2022 but did not meet criteria.   SLUMS 1/30 and ACL 3.8 in 2019.   Plan: continue escitalopram and melatonin. Memory Care staff assistance with cares, meals, activities and med admin.   Goals of care are comfort focused, refer to hospice if she declines.     (N39.0) Urinary tract infection without hematuria, site unspecified  Comment: UC 2/26/2022 grew E coli. She was also treated for E coli UTI in 11/2019   Plan: complete 5 day course of cephalexin     (I48.0) PAF (paroxysmal atrial fibrillation) (H)  Comment: HR 68-78, not on rate controlling med   Plan: continue ASA    (J44.9) Chronic obstructive pulmonary disease, unspecified COPD type (H)  Comment: no acute issues   Plan: continue prednisone, Combivent, guaifenesin prn.    (N18.31) Stage 3a chronic kidney disease (H)  Comment: renal function at baseline at 0.84 on 11/16/2021  Plan: follow BMP. Avoid nephrotoxins     (R13.10) Dysphagia, unspecified type  Comment: recently worked with SPEECH THERAPY.   Plan: continue mechanical soft diet with ground meat      (I10) Benign essential hypertension  Comment: controlled with BPs: 129/72, 140/70    Plan: continue amlodipine         Electronically signed by: CYNDI Glass CNP             Sincerely,        CYNDI Glass CNP

## 2022-02-28 NOTE — PROGRESS NOTES
[facility] GERIATRICS    Chief Complaint   Patient presents with     RECHECK     HPI:  Zoraida Olivarez is a 87 year old  (4/11/1934), who is being seen today for an episodic care visit at: [facility] ([facility]) [[id number]].   She has lived on Memory Care at this facility since 3/2019. Medical history significant for dementia, COPD, afib,  PVD with bilateral iliac stents 2007, essential tremor, herpes zoster 1/2021, depression, chronic diarrhea, cerebral aneurysm repair in 1980 and on Keppra for seizure prophylaxis.   She was hospitalized at [facility] 2/2019 for E coli UTI with sepsis, afib with RVR and COPD exacerbation. She was on hospice, but improved and discharged from hospice 11/2019.        Today's concern is:      Late onset Alzheimer's disease without behavioral disturbance (H)  Adjustment disorder with depressed mood  Urinary tract infection without hematuria, site unspecified  PAF (paroxysmal atrial fibrillation) (H)  Chronic obstructive pulmonary disease, unspecified COPD type (H)  Stage 3a chronic kidney disease (H)  Dysphagia, unspecified type  Benign essential hypertension  She is seen today after her daughter took her to the [facility] ED 2/26/2022 for confusion and concern for UTI. UA was indicative of infection and she returned with cephalexin for 5 days. UC grew >100,000 E coli.   She is resting in bed and minimally interactive. Opens her eyes, but does not respond verbally. Nurses report she has been her usual self. Appetite is fair. Ambulates without a device. Requires supervision to max assist with cares.     Allergies, and PMH/PSH reviewed in EPIC today    REVIEW OF SYSTEMS:  Unobtainable secondary to cognitive impairment.     Objective:   /72   Pulse 69   Temp 97.6  F (36.4  C)   Resp 18   Wt 62 kg (136 lb 9.6 oz)   BMI 23.45 kg/m    GENERAL APPEARANCE:  Alert, in no distress   ENT:  [place], oropharynx clear  EYES:  EOM  normal, conjunctiva and lids normal  NECK: no adenopathy,masses or thyromegaly  RESP: decreased breath sounds bilaterally, no crackles or wheezes   CV:  regular rate and rhythm, no murmur, no edema   ABDOMEN:  soft, non-tender, no distension, no masses  M/S:  in bed. Bilateral hand tremor. PORTILLO with good strength. No joint inflammation  SKIN:  no visible rashes or open areas  PSYCH:  oriented to self, insight and judgement impaired, memory impaired, flat affect          Recent labs in [facility] reviewed by me today.     ASSESSMENT / PLAN:  (G30.1,  F02.80) Late onset Alzheimer's disease without behavioral disturbance (H)  (primary encounter diagnosis)  (F43.21) Adjustment disorder with depressed mood  Comment: severe deficits with low functional status. Weight stable at 136 lbs. She was evaluated by hospice 1/2022 but did not meet criteria.   SLUMS 1/30 and ACL 3.8 in 2019.   Plan: continue escitalopram and melatonin. Memory Care staff assistance with cares, meals, activities and med admin.   Goals of care are comfort focused, refer to hospice if she declines.     (N39.0) Urinary tract infection without hematuria, site unspecified  Comment: UC 2/26/2022 grew E coli. She was also treated for E coli UTI in 11/2019   Plan: complete 5 day course of cephalexin     (I48.0) PAF (paroxysmal atrial fibrillation) (H)  Comment: HR 68-78, not on rate controlling med   Plan: continue ASA    (J44.9) Chronic obstructive pulmonary disease, unspecified COPD type (H)  Comment: no acute issues   Plan: continue prednisone, Combivent, guaifenesin prn.    (N18.31) Stage 3a chronic kidney disease (H)  Comment: renal function at baseline at 0.84 on 11/16/2021  Plan: follow BMP. Avoid nephrotoxins     (R13.10) Dysphagia, unspecified type  Comment: recently worked with [employer]ER[employer]Y.   Plan: continue mechanical soft diet with ground meat     (I10) Benign essential hypertension  Comment: controlled with BPs: 129/72, 140/70    Plan: continue  amlodipine         Electronically signed by: CYNDI Glass CNP

## 2022-04-11 NOTE — LETTER
4/11/2022        RE: Zoraida Olivarez  C/o Nava Teo  [address]  [facility] [address]        [facility] GERIATRICS    Chief Complaint   Patient presents with     RECHECK     HPI:  Zoraida Olivarez is a 87 year old  (4/11/1934), who is being seen today for an episodic care visit at: [facility] (S) [[id number]].   She has lived on Memory Care at this facility since 3/2019. Medical history significant for dementia, COPD, afib,  PVD with bilateral iliac stents 2007, essential tremor, herpes zoster 1/2021, depression, chronic diarrhea, cerebral aneurysm repair in 1980 and on Keppra for seizure prophylaxis.   She was hospitalized at [facility] 2/2019 for E coli UTI with sepsis, afib with RVR and COPD exacerbation. She was on hospice, but improved and discharged from hospice 11/2019.     Today's concern is:      Late onset Alzheimer's disease without behavioral disturbance (H)  Adjustment disorder with depressed mood  PAF (paroxysmal atrial fibrillation) (H)  Chronic obstructive pulmonary disease, unspecified COPD type (H)  Stage 3a chronic kidney disease (H)  PVD (peripheral vascular disease) (H)  Dysphagia, unspecified type  Benign essential hypertension  Vitamin D deficiency  She is unable to provide history. Resting on her couch with her eyes open, but does not interact or speak. Nurses reports she has been resistive with cares and taking meds. No aggression. Ambulates without a device. No falls reported. Requires assist of 1 with cares.     Allergies, and PMH/PSH reviewed in EPIC today    REVIEW OF SYSTEMS:  Unobtainable secondary to cognitive impairment.     Objective:   BP (!) 146/87   Pulse 75   Temp (!) 96.6  F (35.9  C)   Resp 17   Wt 59.5 kg (131 lb 3.2 oz)   BMI 22.52 kg/m    GENERAL APPEARANCE:  Alert, in no distress   ENT:  [place]   EYES:  EOM normal, conjunctiva and lids normal  RESP: decreased breath sounds bilaterally,  no crackles or wheezes   CV:  regular rate and rhythm, no murmur, no edema   ABDOMEN:  soft, non-tender, no distension, no masses  M/S:  in bed. Bilateral hand tremor. PORTILLO with good strength. No joint inflammation  SKIN:  no visible rashes or open areas  PSYCH:  oriented to self, insight and judgement impaired, memory impaired, flat affect      Recent labs in EPIC reviewed by me today.     ASSESSMENT / PLAN:  (G30.1,  F02.80) Late onset Alzheimer's disease without behavioral disturbance (H)  (primary encounter diagnosis)  (F43.21) Adjustment disorder with depressed mood  Comment: severe deficits. Daughter reports she has been more interactive during her recent visits. Resists cares and taking meds-does better with meds later in the day per staff. Mood is poor.    She was evaluated by hospice in Jan 2022 but did not meet criteria. Weight is down 5 lbs at 131 lbs.   Plan: start trazodone 25 mg HS, continue escitalopram. Change admin time of ASA and amlodipine to HS to help with compliance. DISCONTINUE estradiol to limit number of meds.   Discussed with her daughter Nava Bruce, who agrees with plan of care. Goals of care are comfort focused, avoid hospitalization, hospice when appropriate.   Discussed with staff.     (I48.0) PAF (paroxysmal atrial fibrillation) (H)  Comment:HR: 68-85, not requiring rate control    Plan: continue ASA    (J44.9) Chronic obstructive pulmonary disease, unspecified COPD type (H)  Comment: no acute symptoms   Plan: continue prednisone, Combivent, guaifenesin.     (N18.31) Stage 3a chronic kidney disease (H)  Comment: creatinine 0.84 on 11/16/2021  Plan: follow BMP prn. Avoid nephrotoxins     (I73.9) PVD (peripheral vascular disease) (H)  Comment: s/p bilateral iliac stents 2007. No edema and skin intact  Plan: continue ASA     (R13.10) Dysphagia, unspecified type  Comment: worked with [employer] in 11/2021. Oral intake was very poor in the past when on a pureed diet.   Plan: continue  "mechanical soft diet with ground meat. Crush meds and give with food.     (I10) Benign essential hypertension  Comment: controlled with recent BPs: 146/87, 134/87, 130/74    Plan: continue amlodipine    (E55.9) Vitamin D deficiency  Comment: vitamin D level normal at 60 on 11/16/2021. Daughter requests continuing current dose as patient was \"very depressed\" when vitamin D level was low   Plan: continue vitamin D 5000 units every other day        Electronically signed by: CYNDI Glass CNP             Sincerely,        CYNDI Glass CNP    "

## 2022-04-11 NOTE — PROGRESS NOTES
[facility] GERIATRICS    Chief Complaint   Patient presents with     RECHECK     HPI:  Zoraida Olivarez is a 87 year old  (4/11/1934), who is being seen today for an episodic care visit at: [facility] ([facility]) [[id number]].   She has lived on Memory Care at this facility since 3/2019. Medical history significant for dementia, COPD, afib,  PVD with bilateral iliac stents 2007, essential tremor, herpes zoster 1/2021, depression, chronic diarrhea, cerebral aneurysm repair in 1980 and on Keppra for seizure prophylaxis.   She was hospitalized at [facility] 2/2019 for E coli UTI with sepsis, afib with RVR and COPD exacerbation. She was on hospice, but improved and discharged from hospice 11/2019.     Today's concern is:      Late onset Alzheimer's disease without behavioral disturbance (H)  Adjustment disorder with depressed mood  PAF (paroxysmal atrial fibrillation) (H)  Chronic obstructive pulmonary disease, unspecified COPD type (H)  Stage 3a chronic kidney disease (H)  PVD (peripheral vascular disease) (H)  Dysphagia, unspecified type  Benign essential hypertension  Vitamin D deficiency  She is unable to provide history. Resting on her couch with her eyes open, but does not interact or speak. Nurses reports she has been resistive with cares and taking meds. No aggression. Ambulates without a device. No falls reported. Requires assist of 1 with cares.     Allergies, and PMH/PSH reviewed in EPIC today    REVIEW OF SYSTEMS:  Unobtainable secondary to cognitive impairment.     Objective:   BP (!) 146/87   Pulse 75   Temp (!) 96.6  F (35.9  C)   Resp 17   Wt 59.5 kg (131 lb 3.2 oz)   BMI 22.52 kg/m    GENERAL APPEARANCE:  Alert, in no distress   ENT:  [place]   EYES:  EOM normal, conjunctiva and lids normal  RESP: decreased breath sounds bilaterally, no crackles or wheezes   CV:  regular rate and rhythm, no murmur, no edema   ABDOMEN:  soft, non-tender, no  distension, no masses  M/S:  in bed. Bilateral hand tremor. PORTILLO with good strength. No joint inflammation  SKIN:  no visible rashes or open areas  PSYCH:  oriented to self, insight and judgement impaired, memory impaired, flat affect      Recent labs in [facility] reviewed by me today.     ASSESSMENT / PLAN:  (G30.1,  F02.80) Late onset Alzheimer's disease without behavioral disturbance (H)  (primary encounter diagnosis)  (F43.21) Adjustment disorder with depressed mood  Comment: severe deficits. Daughter reports she has been more interactive during her recent visits. Resists cares and taking meds-does better with meds later in the day per staff. Mood is poor.    She was evaluated by hospice in Jan 2022 but did not meet criteria. Weight is down 5 lbs at 131 lbs.   Plan: start trazodone 25 mg HS, continue escitalopram. Change admin time of ASA and amlodipine to HS to help with compliance. DISCONTINUE estradiol to limit number of meds.   Discussed with her daughter Nava Bruce, who agrees with plan of care. Goals of care are comfort focused, avoid hospitalization, hospice when appropriate.   Discussed with staff.     (I48.0) PAF (paroxysmal atrial fibrillation) (H)  Comment:HR: 68-85, not requiring rate control    Plan: continue ASA    (J44.9) Chronic obstructive pulmonary disease, unspecified COPD type (H)  Comment: no acute symptoms   Plan: continue prednisone, Combivent, guaifenesin.     (N18.31) Stage 3a chronic kidney disease (H)  Comment: creatinine 0.84 on 11/16/2021  Plan: follow BMP prn. Avoid nephrotoxins     (I73.9) PVD (peripheral vascular disease) (H)  Comment: s/p bilateral iliac stents 2007. No edema and skin intact  Plan: continue ASA     (R13.10) Dysphagia, unspecified type  Comment: worked with [employer] in 11/2021. Oral intake was very poor in the past when on a pureed diet.   Plan: continue mechanical soft diet with ground meat. Crush meds and give with food.     (I10) Benign essential  "hypertension  Comment: controlled with recent BPs: 146/87, 134/87, 130/74    Plan: continue amlodipine    (E55.9) Vitamin D deficiency  Comment: vitamin D level normal at 60 on 11/16/2021. Daughter requests continuing current dose as patient was \"very depressed\" when vitamin D level was low   Plan: continue vitamin D 5000 units every other day        Electronically signed by: CYNDI Glass CNP       "

## 2022-05-02 NOTE — PROGRESS NOTES
"[facility] GERIATRICS    Chief Complaint   Patient presents with     RECHECK     HPI:  Zoraida Olivarez is a 88 year old  (4/11/1934), who is being seen today for an episodic care visit at: [facility] ([facility]) [[id number]].   She has lived on Memory Care at this facility since 3/2019. Medical history significant for dementia, COPD, afib,  PVD with bilateral iliac stents 2007, essential tremor, herpes zoster 1/2021, depression, chronic diarrhea, cerebral aneurysm repair in 1980 and on Keppra for seizure prophylaxis.   She was hospitalized at [facility] 2/2019 for E coli UTI with sepsis, afib with RVR and COPD exacerbation. She was on hospice, but improved and discharged from hospice 11/2019.     Today's concern is:      Late onset Alzheimer's disease without behavioral disturbance (H)  Adjustment disorder with depressed mood  PAF (paroxysmal atrial fibrillation) (H)  Chronic obstructive pulmonary disease, unspecified COPD type (H)  Stage 3a chronic kidney disease (H)  PVD (peripheral vascular disease) (H)  Benign essential hypertension  Dysphagia, unspecified type   She is alert and talkative today. Unable to provide history. \"I'm just fine.\" Denies pain. Continues to be resistive to bathing and changing clothes. Has been packing her clothes, \" I'm moving out of here.\" No aggression or disruptive behavior.   Ambulates without a device. Requires assist of 1 with cares.   Refused meds  X 2 in the past week.     Allergies, and PMH/PSH reviewed in EPIC today    REVIEW OF SYSTEMS:  Unobtainable secondary to cognitive impairment.     Objective:   BP (!) 143/74   Pulse 69   Temp 97.1  F (36.2  C)   Resp 20   Wt 60.3 kg (133 lb)   BMI 22.83 kg/m    GENERAL APPEARANCE:  Alert, in no distress   ENT:  [place]   EYES:  EOM normal, conjunctiva and lids normal  RESP: diminished breath sounds bilaterally, no crackles or wheezes   CV:  regular rate and rhythm, no murmur, " no edema   ABDOMEN:  soft, non-tender, no distension, no masses  M/S:  sitting up in bed.  Bilateral hand tremor. PORTILLO with good strength. No joint inflammation  SKIN:  no visible rashes or open areas  PSYCH:  oriented to self, insight and judgement impaired, memory impaired, flat affect      Recent labs in EPIC reviewed by me today.     ASSESSMENT / PLAN:  (G30.1,  F02.80) Late onset Alzheimer's disease without behavioral disturbance (H)  (primary encounter diagnosis)  (F43.21) Adjustment disorder with depressed mood  Comment: severe deficits. She is bright and interactive today. Continues to resist cares. Some improvement in med compliance with change in  admin times. Trazodone was started last month .  She was evaluated by hospice in 1/2022 but did not meet criteria. Weight is down 15 lbs in the past year, up 2 lbs in the past few months.   Plan: continue trazodone, escitalopram. Message left for daughter Nava Bruce.     (I48.0) PAF (paroxysmal atrial fibrillation) (H)  Comment: HR: 69-76  Plan: continue ASA    (J44.9) Chronic obstructive pulmonary disease, unspecified COPD type (H)  Comment: no s/s of exacerbation  Plan: continue prednisone, Combivent, guaifenesin     (N18.31) Stage 3a chronic kidney disease (H)  Comment: most recent creatinine 0.84, baseline   Plan: follow BMP. Avoid nephrotoxins     (I73.9) PVD (peripheral vascular disease) (H)  Comment: s/p bilateral iliac stents 2007. No LE edema or open areas   Plan: continue ASA    (I10) Benign essential hypertension  Comment: controlled with BPs: 143/74, 136/82   Plan: continue amlodipine     (R13.10) Dysphagia, unspecified type  Comment: tolerating mechanical soft diet with ground meat. Her oral intake declined when on a pureed diet.   Plan: continue current diet. Crush meds and give with food.         Electronically signed by: CYNDI Glass CNP

## 2022-05-02 NOTE — LETTER
"    5/2/2022        RE: Zoraida Olivarez  C/o Nava Teo  [address]  [facility] [address]        [facility] GERIATRICS    Chief Complaint   Patient presents with     RECHECK     HPI:  Zoraida Olivarez is a 88 year old  (4/11/1934), who is being seen today for an episodic care visit at: [facility] (S) [[id number]].   She has lived on Memory Care at this facility since 3/2019. Medical history significant for dementia, COPD, afib,  PVD with bilateral iliac stents 2007, essential tremor, herpes zoster 1/2021, depression, chronic diarrhea, cerebral aneurysm repair in 1980 and on Keppra for seizure prophylaxis.   She was hospitalized at [facility] 2/2019 for E coli UTI with sepsis, afib with RVR and COPD exacerbation. She was on hospice, but improved and discharged from hospice 11/2019.     Today's concern is:      Late onset Alzheimer's disease without behavioral disturbance (H)  Adjustment disorder with depressed mood  PAF (paroxysmal atrial fibrillation) (H)  Chronic obstructive pulmonary disease, unspecified COPD type (H)  Stage 3a chronic kidney disease (H)  PVD (peripheral vascular disease) (H)  Benign essential hypertension  Dysphagia, unspecified type   She is alert and talkative today. Unable to provide history. \"I'm just fine.\" Denies pain. Continues to be resistive to bathing and changing clothes. Has been packing her clothes, \" I'm moving out of here.\" No aggression or disruptive behavior.   Ambulates without a device. Requires assist of 1 with cares.   Refused meds  X 2 in the past week.     Allergies, and PMH/PSH reviewed in EPIC today    REVIEW OF SYSTEMS:  Unobtainable secondary to cognitive impairment.     Objective:   BP (!) 143/74   Pulse 69   Temp 97.1  F (36.2  C)   Resp 20   Wt 60.3 kg (133 lb)   BMI 22.83 kg/m    GENERAL APPEARANCE:  Alert, in no distress   ENT:  [place]   EYES:  EOM normal, conjunctiva and lids " normal  RESP: diminished breath sounds bilaterally, no crackles or wheezes   CV:  regular rate and rhythm, no murmur, no edema   ABDOMEN:  soft, non-tender, no distension, no masses  M/S:  sitting up in bed.  Bilateral hand tremor. PORTILLO with good strength. No joint inflammation  SKIN:  no visible rashes or open areas  PSYCH:  oriented to self, insight and judgement impaired, memory impaired, flat affect      Recent labs in EPIC reviewed by me today.     ASSESSMENT / PLAN:  (G30.1,  F02.80) Late onset Alzheimer's disease without behavioral disturbance (H)  (primary encounter diagnosis)  (F43.21) Adjustment disorder with depressed mood  Comment: severe deficits. She is bright and interactive today. Continues to resist cares. Some improvement in med compliance with change in  admin times. Trazodone was started last month .  She was evaluated by hospice in 1/2022 but did not meet criteria. Weight is down 15 lbs in the past year, up 2 lbs in the past few months.   Plan: continue trazodone, escitalopram. Message left for daughter Nava Bruce.     (I48.0) PAF (paroxysmal atrial fibrillation) (H)  Comment: HR: 69-76  Plan: continue ASA    (J44.9) Chronic obstructive pulmonary disease, unspecified COPD type (H)  Comment: no s/s of exacerbation  Plan: continue prednisone, Combivent, guaifenesin     (N18.31) Stage 3a chronic kidney disease (H)  Comment: most recent creatinine 0.84, baseline   Plan: follow BMP. Avoid nephrotoxins     (I73.9) PVD (peripheral vascular disease) (H)  Comment: s/p bilateral iliac stents 2007. No LE edema or open areas   Plan: continue ASA    (I10) Benign essential hypertension  Comment: controlled with BPs: 143/74, 136/82   Plan: continue amlodipine     (R13.10) Dysphagia, unspecified type  Comment: tolerating mechanical soft diet with ground meat. Her oral intake declined when on a pureed diet.   Plan: continue current diet. Crush meds and give with food.         Electronically signed by: Miki  CYNDI Cruz CNP            Sincerely,        CYNDI Glass CNP

## 2022-06-06 NOTE — PROGRESS NOTES
"[facility] GERIATRICS  ACUTE/EPISODIC VISIT    [facility] Medical Record Number:  [id number]  [facility] took [facility] [[id number][facility]    Chief Complaint   Patient presents with     RECHECK       HPI:    Zoraida Olivarez is a 88 year old  (4/11/1934), who is being seen today for an episodic care visit.  HPI information obtained from: facility chart records, facility staff, patient report and [facility] chart review.  She has lived in Memory Care at this facility since 3/2019. Medical history significant for dementia, COPD, afib,  PVD with bilateral iliac stents 2007, essential tremor, herpes zoster 1/2021, depression, chronic diarrhea, cerebral aneurysm repair in 1980 and on Keppra for seizure prophylaxis.   She was hospitalized at [facility] 2/2019 for E coli UTI with sepsis, afib with RVR and COPD exacerbation. She was on hospice, but improved and discharged from hospice 11/2019.     Today's concern is:     Late onset Alzheimer's disease without behavioral disturbance (H)  Dysphagia, unspecified type  Adjustment disorder with depressed mood  PAF (paroxysmal atrial fibrillation) (H)  Chronic obstructive pulmonary disease, unspecified COPD type (H)  Stage 3a chronic kidney disease (H)  PVD (peripheral vascular disease) (H)  Benign essential hypertension  She is unable to provide history. Alert and interactive, does not respond to questions appropriately. \"It's ok.\" Ambulates without a device. Requires assist of 1 with cares. Can be resistive, but no aggression. Sometimes packs her clothes and states that she's moving.   Nurse reports patient's daughter is concerned that she may have a UTI due to increased confusion. Afebrile and no s/s of acute illness noted by staff.       ALLERGIES:    Allergies   Allergen Reactions     [place] Anti-Gas [Simethicone] Rash        MEDICATIONS:  Post Discharge Medication " Reconciliation Status: discharge medications reconciled, continue medications without change.    Current Outpatient Medications   Medication Sig Dispense Refill     acetaminophen (TYLENOL) 325 MG tablet TAKE TWO TABLETS (650MG) BY MOUTH EVERY 6 HOURS AS NEEDED FOR PAIN RATED 1-4 60 tablet 97     amLODIPine (NORVASC) 5 MG tablet 1 TABLET ORALLY DAILY (DX: HYPERTENSION) 28 tablet 10     ammonium lactate (AMLACTIN) 12 % external cream Apply topically daily as needed for dry skin       ASPIRIN LOW DOSE 81 MG chewable tablet 1 TABLET ORALLY DAILY 28 tablet 11     cholecalciferol (VITAMIN D3) 125 mcg (5000 units) capsule 1 CAPSULE ORALLY EVERY OTHER DAY 14 capsule 11     escitalopram (LEXAPRO) 10 MG tablet 1 TABLET ORALLY EVERY MORNING (DX: DEPRESSION) 28 tablet 11     guaiFENesin (ROBITUSSIN) 100 MG/5ML SYRP Take 10 mLs by mouth 2 times daily as needed for cough 473 mL 97     ipratropium-albuterol (COMBIVENT RESPIMAT)  MCG/ACT inhaler Inhale 2 puffs into the lungs daily       levETIRAcetam (KEPPRA) 250 MG tablet 1 TABLET ORALLY DAILY (DX: SEIZURES) 28 tablet 11     melatonin 3 MG tablet Take 1 tablet (3 mg) by mouth At Bedtime 30 tablet 11     polyethylene glycol (MIRALAX) 17 GM/Dose powder MIX 17GM OF POWDER IN 8OZ OF WATER UNTIL COMPLETELY DISSOLVED. DRINK SOLUTION BY MOUTH ONCE DAILY 510 g 97     predniSONE (DELTASONE) 2.5 MG tablet 3 TABLETS (7.5MG) ORALLY DAILY (DX: CHRONIC OBSTRUCTIVE PULMONARY DISEASE) 84 tablet 10     sennosides (SENOKOT) 8.6 MG tablet TAKE 1 TABLET BY MOUTH TWICE DAILY AS NEEDED FOR CONSTIPATION 30 tablet 97     traZODone (DESYREL) 50 MG tablet Take 0.5 tablets (25 mg) by mouth At Bedtime 30 tablet 4     White Petrolatum-Mineral Oil (SYSTANE NIGHTTIME) OINT APPLY A THIN RIBBON TO RIGHT LOWER LID TWICE DAILY 3.5 g 97         REVIEW OF SYSTEMS:  Unable to obtain due to dementia. Positives as noted under HPI.       PHYSICAL EXAM:  /70   Pulse 64   Temp 97.3  F (36.3  C)   Resp 20    "Ht 1.626 m (5' 4\")   Wt 58.1 kg (128 lb)   SpO2 91%   BMI 21.97 kg/m    GENERAL APPEARANCE:  Alert, in no distress, frail appearing   ENT:  [place]   EYES:  conjunctiva and lids normal  RESP: diminished breath sounds bilaterally, no crackles or wheezes   CV:  regular rate and rhythm, no murmur, no edema   ABDOMEN:  soft, non-tender, no distension, no masses  M/S:  resting in bed.  Bilateral hand tremor. PORTILLO with good strength. No joint inflammation  SKIN:  no visible rashes or open areas  PSYCH:  oriented to self, insight and judgement impaired, memory impaired, affect normal     ASSESSMENT / PLAN:  (G30.1,  F02.80) Late onset Alzheimer's disease without behavioral disturbance (H)  (primary encounter diagnosis)  (F43.21) Adjustment disorder with depressed mood  Comment: severe deficits with minimal verbal skills. She is alert and interactive today with no s/s of UTI or other acute illness. Trazodone was started 4/2022 with some improvement in mood and anxiety.   She was evaluated by hospice in 1/2022 but did not meet criteria. Weight is stable at 130 lbs  Plan: continue trazodone, escitalopram.   Message left for daughter Nava Bruce.   Discussed with staff.     (R13.10) Dysphagia, unspecified type  Comment: tolerating mech soft with ground meat   Plan: continue modified diet     (I48.0) PAF (paroxysmal atrial fibrillation) (H)  Comment: HR: 64-75. Rhythm regular on exam   Plan: continue ASA    (J44.9) Chronic obstructive pulmonary disease, unspecified COPD type (H)  Comment: no acute symptoms   Plan: continue combivent and guaifenesin. Continue prednisone 7.5 mg po daily-may be able to decrease to 5 mg daily    (N18.31) Stage 3a chronic kidney disease (H)  Comment: most recent creatinine 0.84  Plan: follow BMP prn. Avoid nephrotoxins     (I73.9) PVD (peripheral vascular disease) (H)  Comment: no edema, skin intact  Bilateral iliac stent placement 2007  Plan: continue ASA    (I10) Benign essential " hypertension  Comment: controlled with BPs: 132/73, 130/70   Plan: continue amlodipine         Electronically signed by  CYNDI Glass CNP

## 2022-06-10 NOTE — LETTER
"    6/6/2022        RE: Zoraida Olivarez  C/o Nava Teo  [address]  [facility] [address]        [facility] GERIATRICS  ACUTE/EPISODIC VISIT    [facility] Medical Record Number:  [id number]  Place of Service where encounter took place:  [facility] [[id number]]    Chief Complaint   Patient presents with     RECHECK       HPI:    Zoraida Olivarez is a 88 year old  (4/11/1934), who is being seen today for an episodic care visit.  HPI information obtained from: facility chart records, facility staff, patient report and [facility] chart review.  She has lived in Memory Care at this facility since 3/2019. Medical history significant for dementia, COPD, afib,  PVD with bilateral iliac stents 2007, essential tremor, herpes zoster 1/2021, depression, chronic diarrhea, cerebral aneurysm repair in 1980 and on Keppra for seizure prophylaxis.   She was hospitalized at [facility] 2/2019 for E coli UTI with sepsis, afib with RVR and COPD exacerbation. She was on hospice, but improved and discharged from hospice 11/2019.     Today's concern is:     Late onset Alzheimer's disease without behavioral disturbance (H)  Dysphagia, unspecified type  Adjustment disorder with depressed mood  PAF (paroxysmal atrial fibrillation) (H)  Chronic obstructive pulmonary disease, unspecified COPD type (H)  Stage 3a chronic kidney disease (H)  PVD (peripheral vascular disease) (H)  Benign essential hypertension  She is unable to provide history. Alert and interactive, does not respond to questions appropriately. \"It's ok.\" Ambulates without a device. Requires assist of 1 with cares. Can be resistive, but no aggression. Sometimes packs her clothes and states that she's moving.   Nurse reports patient's daughter is concerned that she may have a UTI due to increased confusion. Afebrile and no s/s of acute illness noted by staff.       ALLERGIES:    Allergies "   Allergen Reactions     [place] Anti-Gas [Simethicone] Rash        MEDICATIONS:  Post Discharge Medication Reconciliation Status: discharge medications reconciled, continue medications without change.    Current Outpatient Medications   Medication Sig Dispense Refill     acetaminophen (TYLENOL) 325 MG tablet TAKE TWO TABLETS (650MG) BY MOUTH EVERY 6 HOURS AS NEEDED FOR PAIN RATED 1-4 60 tablet 97     amLODIPine (NORVASC) 5 MG tablet 1 TABLET ORALLY DAILY (DX: HYPERTENSION) 28 tablet 10     ammonium lactate (AMLACTIN) 12 % external cream Apply topically daily as needed for dry skin       ASPIRIN LOW DOSE 81 MG chewable tablet 1 TABLET ORALLY DAILY 28 tablet 11     cholecalciferol (VITAMIN D3) 125 mcg (5000 units) capsule 1 CAPSULE ORALLY EVERY OTHER DAY 14 capsule 11     escitalopram (LEXAPRO) 10 MG tablet 1 TABLET ORALLY EVERY MORNING (DX: DEPRESSION) 28 tablet 11     guaiFENesin (ROBITUSSIN) 100 MG/5ML SYRP Take 10 mLs by mouth 2 times daily as needed for cough 473 mL 97     ipratropium-albuterol (COMBIVENT RESPIMAT)  MCG/ACT inhaler Inhale 2 puffs into the lungs daily       levETIRAcetam (KEPPRA) 250 MG tablet 1 TABLET ORALLY DAILY (DX: SEIZURES) 28 tablet 11     melatonin 3 MG tablet Take 1 tablet (3 mg) by mouth At Bedtime 30 tablet 11     polyethylene glycol (MIRALAX) 17 GM/Dose powder MIX 17GM OF POWDER IN 8OZ OF WATER UNTIL COMPLETELY DISSOLVED. DRINK SOLUTION BY MOUTH ONCE DAILY 510 g 97     predniSONE (DELTASONE) 2.5 MG tablet 3 TABLETS (7.5MG) ORALLY DAILY (DX: CHRONIC OBSTRUCTIVE PULMONARY DISEASE) 84 tablet 10     sennosides (SENOKOT) 8.6 MG tablet TAKE 1 TABLET BY MOUTH TWICE DAILY AS NEEDED FOR CONSTIPATION 30 tablet 97     traZODone (DESYREL) 50 MG tablet Take 0.5 tablets (25 mg) by mouth At Bedtime 30 tablet 4     White Petrolatum-Mineral Oil (SYSTANE NIGHTTIME) OINT APPLY A THIN RIBBON TO RIGHT LOWER LID TWICE DAILY 3.5 g 97         REVIEW OF SYSTEMS:  Unable to obtain due to dementia.  "Positives as noted under HPI.       PHYSICAL EXAM:  /70   Pulse 64   Temp 97.3  F (36.3  C)   Resp 20   Ht 1.626 m (5' 4\")   Wt 58.1 kg (128 lb)   SpO2 91%   BMI 21.97 kg/m    GENERAL APPEARANCE:  Alert, in no distress, frail appearing   ENT:  [place]   EYES:  conjunctiva and lids normal  RESP: diminished breath sounds bilaterally, no crackles or wheezes   CV:  regular rate and rhythm, no murmur, no edema   ABDOMEN:  soft, non-tender, no distension, no masses  M/S:  resting in bed.  Bilateral hand tremor. PORTILLO with good strength. No joint inflammation  SKIN:  no visible rashes or open areas  PSYCH:  oriented to self, insight and judgement impaired, memory impaired, affect normal     ASSESSMENT / PLAN:  (G30.1,  F02.80) Late onset Alzheimer's disease without behavioral disturbance (H)  (primary encounter diagnosis)  (F43.21) Adjustment disorder with depressed mood  Comment: severe deficits with minimal verbal skills. She is alert and interactive today with no s/s of UTI or other acute illness. Trazodone was started 4/2022 with some improvement in mood and anxiety.   She was evaluated by hospice in 1/2022 but did not meet criteria. Weight is stable at 130 lbs  Plan: continue trazodone, escitalopram.   Message left for daughter Nava Bruce.   Discussed with staff.     (R13.10) Dysphagia, unspecified type  Comment: tolerating mech soft with ground meat   Plan: continue modified diet     (I48.0) PAF (paroxysmal atrial fibrillation) (H)  Comment: HR: 64-75. Rhythm regular on exam   Plan: continue ASA    (J44.9) Chronic obstructive pulmonary disease, unspecified COPD type (H)  Comment: no acute symptoms   Plan: continue combivent and guaifenesin. Continue prednisone 7.5 mg po daily-may be able to decrease to 5 mg daily    (N18.31) Stage 3a chronic kidney disease (H)  Comment: most recent creatinine 0.84  Plan: follow BMP prn. Avoid nephrotoxins     (I73.9) PVD (peripheral vascular disease) (H)  Comment: no edema, " skin intact  Bilateral iliac stent placement 2007  Plan: continue ASA    (I10) Benign essential hypertension  Comment: controlled with BPs: 132/73, 130/70   Plan: continue amlodipine         Electronically signed by  CYNDI Glass CNP               Sincerely,        CYNDI Glass CNP

## 2022-07-11 NOTE — LETTER
"    7/11/2022        RE: Zoraida Olivarez  C/o Nava Teo  [address] [facility] [address]        Zoraida Olivarez is a 88 year old female seen July 11, 2022 at [facility] unit where she has resided for >3 years (admit 2/2019) seen to follow up COPD with hypoxia and dementia.   Pt is seen in her room up to chair with her breakfast in front of her   Has pocketed food in her mouth.   Not able to give any history secondary to her dementia.  She was seen later at the lunch table in the Dining Room, wearing a winter jacket and carrying her purse.   Nursing staff reports pt has been sleeping more during the days and refuses to get up for cares at times.   Last week she had an incident of exit seeking.     Talked with daughter Nava by phone.  Feels her mother's course is up and down, but overall declining.  Lungs worse now, per daughter with cough and dyspnea.  (Not seen during visit today.)       By chart review, patient was found down at home in February 2019 and hospitalized at [facility] for E coli UTI with sepsis, atrial fib with RVR and COPD exacerbation  She was treated with IVF and abx; VR control with metoprolol, but not anticoagulated given h/o brain aneurysm, falls and dementia.  She went to [facility] TCU, but did not improve enough to return to independent living, transferred to AL for permanent placement.    Pt was on Hospice for a while, but \"graduated\" in November 2019 secondary to stability.     Patient has followed with Dr Hightower, Neurology for decades after being diagnosed with a cerebral aneurysm in 1980.  She had a surgical repair and is on Keppra for sz prophylaxis.    No current headaches or neurologic changes.     She had an outbreak of shingles in January 2021   In February 2022 daughter took pt to [facility] ED for confusion and dizziness.  Pt was treated for an E Coli UTI and returned to her baseline       Past Medical History: " "  Diagnosis Date     Cerebral aneurysm, 1980      Chronic obstructive lung disease (H), 2003      Hearing loss, sensorineural, combined types, 2009      History of shingles      Hyperlipidemia      Ileitis      Incomplete RBBB      Major depression      Memory loss      PAD (peripheral artery disease) (H), bilateral iliac stents 2007      Polyp of colon, adenomatous, 2008      Skin cancer of arm      Tobacco abuse      Tremor, essential, 2004      Past Surgical History:   Procedure Laterality Date     APPENDECTOMY       CHOLECYSTECTOMY       HYSTERECTOMY       SH:  Lives in [facility] unit.   Previously lived alone in her condo.      She has a daughter Nava and a son Amos.   Pt reports she grew up in [place].  Worked as a  at one point.      +smoker until admission; smoked for >60 years    ROS:  SLUMS 1/30   ACL 3.8 in TCU  Able to stand independently, ambulatory without device  PVD had LE edema and venous stasis ulcers, now healed.     Wt Readings from Last 5 Encounters:   07/11/22 57 kg (125 lb 9.6 oz)   06/05/22 58.1 kg (128 lb)   05/01/22 60.3 kg (133 lb)   04/10/22 59.5 kg (131 lb 3.2 oz)   02/28/22 62 kg (136 lb 9.6 oz)      EXAM: NAD  BP (!) 151/98   Pulse 67   Temp 97  F (36.1  C)   Resp 24   Ht 1.626 m (5' 4\")   Wt 57 kg (125 lb 9.6 oz)   SpO2 95%   BMI 21.56 kg/m     Neck supple without adenopathy  Lungs with markedly decreased BS, no rales or wheeze  Heart RRR s1s2  Abd soft, NT, no distention or guarding, +BS  Ext without edema  Neuro: limited nonsensical speech, bilateral hand intention tremor     Psych: affect flat     Labs reviewed; unremarkable BMP in November 2021, GFR 54   Vit D level 60      IMP/PLAN:   (J44.9) Chronic obstructive pulmonary disease, unspecified COPD type (H)   Comment: significant smoking history   Plan:   Remains on prednisone 7.5 mg/day>>> decrease dose to 5 mg/day and follow closely     Combivent respimat daily     (N18.31) Stage 3a chronic kidney disease "   (I10) Benign essential hypertension  Comment: GFR 54  BP Readings from Last 3 Encounters:   07/11/22 (!) 151/98   06/05/22 130/70   05/01/22 (!) 143/74      Plan: amlodipine 5 mg/day  Follow bps and BMP      (I48.0) PAF (paroxysmal atrial fibrillation) (H)  Comment:    Pulse Readings from Last 4 Encounters:   07/11/22 67   06/05/22 64   05/01/22 69   04/10/22 75      Plan: controlled VR without rate slowing medications; on ASA only as above     No change    (I73.9) PVD (peripheral vascular disease) (H)  Comment: h/o LE ulcers    Plan: monitor for open areas     (Z86.79) Hx of aneurysm  Comment: s/p repair   Plan: continue Keppra 250 mg/day for stroke prophylaxis    (R13.10) Dysphagia, unspecified type  Comment: weight down 10 lbs in past 6 months   Some pocketing seen today.   Plan: mechanical soft diet with thin liquids; needs more assistance with meals    She is on [employer] nutritional supplements      (G30.1,  F02.80) Late onset Alzheimer's disease without behavioral disturbance  Comment: low functional status    Plan: [facility] unit for assist with meds, meals, activity and secure unit  Reviewed overall decline with daughter, with failure to thrive and weight loss.  Family interested in comfort focus.  Pt had been evaluated by Hospice in April 2022 but didn't qualify, suspect she might now and would consider re-evaluation.          (F43.21) Adjustment disorder with depressed mood  Comment: remains pretty flat    Plan: continue escitalopram 10 mg/day  Also on trazodone 25 mg/HS and melatonin 3 mg/HS for sleep        Meghan Robles MD         Sincerely,        Meghan Robles MD

## 2022-07-11 NOTE — PROGRESS NOTES
"Zoraida Olivarez is a 88 year old female seen July 11, 2022 at [facility] Memory Care unit where she has resided for >3 years (admit 2/2019) seen to follow up COPD with hypoxia and dementia.   Pt is seen in her room up to chair with her breakfast in front of her   Has pocketed food in her mouth.   Not able to give any history secondary to her dementia.  She was seen later at the lunch table in the Dining Room, wearing a winter jacket and carrying her purse.   Nursing staff reports pt has been sleeping more during the days and refuses to get up for cares at times.   Last week she had an incident of exit seeking.     Talked with daughter Nava by phone.  Feels her mother's course is up and down, but overall declining.  Lungs worse now, per daughter with cough and dyspnea.  (Not seen during visit today.)       By chart review, patient was found down at home in February 2019 and hospitalized at [facility] for E coli UTI with sepsis, atrial fib with RVR and COPD exacerbation  She was treated with IVF and abx; VR control with metoprolol, but not anticoagulated given h/o brain aneurysm, falls and dementia.  She went to [facility] TCU, but did not improve enough to return to independent living, transferred to AL for permanent placement.    Pt was on Hospice for a while, but \"graduated\" in November 2019 secondary to stability.     Patient has followed with Dr Hightower, Neurology for decades after being diagnosed with a cerebral aneurysm in 1980.  She had a surgical repair and is on Keppra for sz prophylaxis.    No current headaches or neurologic changes.     She had an outbreak of shingles in January 2021   In February 2022 daughter took pt to [facility] ED for confusion and dizziness.  Pt was treated for an E Coli UTI and returned to her baseline       Past Medical History:   Diagnosis Date     Cerebral aneurysm, 1980      Chronic obstructive lung disease (H), 2003      Hearing loss, " "sensorineural, combined types, 2009      History of shingles      Hyperlipidemia      Ileitis      Incomplete RBBB      Major depression      Memory loss      PAD (peripheral artery disease) (H), bilateral iliac stents 2007      Polyp of colon, adenomatous, 2008      Skin cancer of arm      Tobacco abuse      Tremor, essential, 2004      Past Surgical History:   Procedure Laterality Date     APPENDECTOMY       CHOLECYSTECTOMY       HYSTERECTOMY       SH:  Lives in [facility] Memory [facility] unit.   Previously lived alone in her condo.      She has a daughter Nava and a son Amos.   Pt reports she grew up in [place].  Worked as a  at one point.      +smoker until admission; smoked for >60 years    ROS:  SLUMS 1/30   ACL 3.8 in TCU  Able to stand independently, ambulatory without device  PVD had LE edema and venous stasis ulcers, now healed.     Wt Readings from Last 5 Encounters:   07/11/22 57 kg (125 lb 9.6 oz)   06/05/22 58.1 kg (128 lb)   05/01/22 60.3 kg (133 lb)   04/10/22 59.5 kg (131 lb 3.2 oz)   02/28/22 62 kg (136 lb 9.6 oz)      EXAM: NAD  BP (!) 151/98   Pulse 67   Temp 97  F (36.1  C)   Resp 24   Ht 1.626 m (5' 4\")   Wt 57 kg (125 lb 9.6 oz)   SpO2 95%   BMI 21.56 kg/m     Neck supple without adenopathy  Lungs with markedly decreased BS, no rales or wheeze  Heart RRR s1s2  Abd soft, NT, no distention or guarding, +BS  Ext without edema  Neuro: limited nonsensical speech, bilateral hand intention tremor     Psych: affect flat     Labs reviewed; unremarkable BMP in November 2021, GFR 54   Vit D level 60      IMP/PLAN:   (J44.9) Chronic obstructive pulmonary disease, unspecified COPD type (H)   Comment: significant smoking history   Plan:   Remains on prednisone 7.5 mg/day>>> decrease dose to 5 mg/day and follow closely     Combivent respimat daily     (N18.31) Stage 3a chronic kidney disease   (I10) Benign essential hypertension  Comment: GFR 54  BP Readings from Last 3 Encounters:   07/11/22 (!) 151/98 "   06/05/22 130/70   05/01/22 (!) 143/74      Plan: amlodipine 5 mg/day  Follow bps and BMP      (I48.0) PAF (paroxysmal atrial fibrillation) (H)  Comment:    Pulse Readings from Last 4 Encounters:   07/11/22 67   06/05/22 64   05/01/22 69   04/10/22 75      Plan: controlled VR without rate slowing medications; on ASA only as above     No change    (I73.9) PVD (peripheral vascular disease) (H)  Comment: h/o LE ulcers    Plan: monitor for open areas     (Z86.79) Hx of aneurysm  Comment: s/p repair   Plan: continue Keppra 250 mg/day for stroke prophylaxis    (R13.10) Dysphagia, unspecified type  Comment: weight down 10 lbs in past 6 months   Some pocketing seen today.   Plan: mechanical soft diet with thin liquids; needs more assistance with meals    She is on [employer] nutritional supplements      (G30.1,  F02.80) Late onset Alzheimer's disease without behavioral disturbance  Comment: low functional status    Plan: [facility] unit for assist with meds, meals, activity and secure unit  Reviewed overall decline with daughter, with failure to thrive and weight loss.  Family interested in comfort focus.  Pt had been evaluated by Hospice in April 2022 but didn't qualify, suspect she might now and would consider re-evaluation.          (F43.21) Adjustment disorder with depressed mood  Comment: remains pretty flat    Plan: continue escitalopram 10 mg/day  Also on trazodone 25 mg/HS and melatonin 3 mg/HS for sleep        Meghan Robles MD

## 2022-07-25 NOTE — TELEPHONE ENCOUNTER
Zoraida Olivarez    1934    Orders 2022:  1. Refer to hospice of family's preference-dementia, weight loss, decline in functional status      CYNDI Glass CNP on 2022 at 3:15 PM

## 2022-07-25 NOTE — TELEPHONE ENCOUNTER
Patient's daughter Nava Álvarez called to discuss a hospice referral. Patient continues to decline in cognition and functional status. She is less verbal and less interactive with family. Mobility is poor, incontinence has worsened and weight is down 10 lbs in the past 6 months.     Order given to the AL for a referral to hospice of family's preference.     CYNDI Glass CNP on 7/25/2022 at 3:14 PM     Spoke with the pt she stated she is taking amiodarone 200mg daily  Not 100mg as Dr Kenn Bhatia had previously stated  Pt stated her bp is 138/83 hr 79, 141/76 hr 84  Pt has been feeling really dizzy and would like to know if any meds change would be appropriate for her

## 2022-08-14 NOTE — PROGRESS NOTES
[facility] GERIATRICS  [place] Medical Record Number: [id number]  Chief Complaint   Patient presents with     RECHECK     HPI: Zoraida Olivarez is a 88 year old (4/11/1934), who is being seen today for an episodic care visit at: [facility] ([facility]) [[id number]].   She has lived in Memory Care at this facility since 3/2019. Medical history significant for dementia, COPD, afib,  PVD with bilateral iliac stents 2007, essential tremor, herpes zoster 1/2021, depression, chronic diarrhea, cerebral aneurysm repair in 1980 and on Keppra for seizure prophylaxis.   She was hospitalized at [facility] 2/2019 for E coli UTI with sepsis, afib with RVR and COPD exacerbation. She was on hospice, but improved and discharged from hospice 11/2019.    She's had a gradual decline in cognition and functional status with a 10 lb weight loss in 6 months. Enrolled with [facility] 8/4/2022.     Today's concern is:      Late onset Alzheimer's disease without behavioral disturbance (H)  Adjustment disorder with depressed mood  Dysphagia, unspecified type  Weight loss  Hospice care patient  PAF (paroxysmal atrial fibrillation) (H)  Stage 3a chronic kidney disease (H)  Chronic obstructive pulmonary disease, unspecified COPD type (H)  PVD (peripheral vascular disease) (H)  Benign essential hypertension  She is alert and interactive, poor historian. Has finished breakfast and ate about 50%. Diet was downgraded to pureed on 7/29/2022. Denies feeling ill. Denies pain. Ambulates without a device. No recent falls. Isolates in her apartment, which is not new. Staff reports that she is sleeping more during the day. Resistive with cares, no aggression.     Allergies and PMH/PSH reviewed in [facility] today.    REVIEW OF SYSTEMS:  Unable to obtain due to dementia. Positives as noted above.     Objective:   /83   Pulse 66   Temp 97.3  F (36.3  C)   Resp 22   Wt 55.7 kg (122  "lb 12.8 oz)   BMI 21.08 kg/m    Exam:  GENERAL APPEARANCE:  Alert, in no distress, appears frail   ENT:  [place], oropharynx clear   EYES:  conjunctiva and lids normal  RESP: diminished breath sounds bilaterally, no crackles or wheezes   CV:  regular rate and rhythm, no murmur, no edema   ABDOMEN:  soft, non-tender, no distension, no masses  M/S: up in chair.  Bilateral hand tremor. PORTILLO with good strength. No joint inflammation  SKIN:  no visible rashes or open areas  PSYCH:  oriented to self, insight and judgement impaired, memory impaired, affect normal     Labs:   Recent labs in [facility] reviewed by me today.     ASSESSMENT / PLAN:  (G30.1,  F02.80) Late onset Alzheimer's disease without behavioral disturbance (H)  (primary encounter diagnosis)  (F43.21) Adjustment disorder with depressed mood  (R13.10) Dysphagia, unspecified type  (R63.4) Weight loss  (Z51.5) Hospice care patient  Comment: progressive disease with severe deficits and weight loss  Spoke with her daughter Nava Bruce, who visits regularly and has noted fatigue, \"up and down\" days, appetite slightly better on pureed diet.   Plan: continue trazodone, escitalopram. Continue pureed diet. Memory Care staff assistance with cares, meals, activities and med admin. Comfort meds and cares with [facility].     (I48.0) PAF (paroxysmal atrial fibrillation) (H)  Comment: HR: 66  Plan: continue ASA    (N18.31) Stage 3a chronic kidney disease (H)  Comment: creatinine 0.84 on 11/16/2021  Plan: avoid nephrotoxins. No need for labs given goals of care     (J44.9) Chronic obstructive pulmonary disease, unspecified COPD type (H)  Comment: no acute issues   Plan: continue prednisone 7.5 mg daily, combivent and guaifenesin     (I73.9) PVD (peripheral vascular disease) (H)  Comment: no LE edema or wounds   History of bilateral iliac stents in 2017   Plan: continue ASA    (I10) Benign essential hypertension  Comment: controlled with BPs: 139/83, 144/88  Plan: continue " amlodipine         Electronically signed by: CYNDI Glass CNP

## 2022-08-15 NOTE — LETTER
8/15/2022        RE: Zoraida Olivarez  C/o Nava Teo  [address]  [facility] [address]        [facility] GERIATRICS  [place] Medical Record Number: [id number]  Chief Complaint   Patient presents with     RECHECK     HPI: Zoraida Olivarez is a 88 year old (4/11/1934), who is being seen today for an episodic care visit at: [facility] ([facility]) [[id number]].   She has lived in Memory Care at this facility since 3/2019. Medical history significant for dementia, COPD, afib,  PVD with bilateral iliac stents 2007, essential tremor, herpes zoster 1/2021, depression, chronic diarrhea, cerebral aneurysm repair in 1980 and on Keppra for seizure prophylaxis.   She was hospitalized at  2/2019 for E coli UTI with sepsis, afib with RVR and COPD exacerbation. She was on hospice, but improved and discharged from hospice 11/2019.    She's had a gradual decline in cognition and functional status with a 10 lb weight loss in 6 months. Enrolled with [facility] 8/4/2022.     Today's concern is:      Late onset Alzheimer's disease without behavioral disturbance (H)  Adjustment disorder with depressed mood  Dysphagia, unspecified type  Weight loss  Hospice care patient  PAF (paroxysmal atrial fibrillation) (H)  Stage 3a chronic kidney disease (H)  Chronic obstructive pulmonary disease, unspecified COPD type (H)  PVD (peripheral vascular disease) (H)  Benign essential hypertension  She is alert and interactive, poor historian. Has finished breakfast and ate about 50%. Diet was downgraded to pureed on 7/29/2022. Denies feeling ill. Denies pain. Ambulates without a device. No recent falls. Isolates in her apartment, which is not new. Staff reports that she is sleeping more during the day. Resistive with cares, no aggression.     Allergies and PMH/PSH reviewed in [facility] today.    REVIEW OF SYSTEMS:  Unable to obtain due to dementia.  "Positives as noted above.     Objective:   /83   Pulse 66   Temp 97.3  F (36.3  C)   Resp 22   Wt 55.7 kg (122 lb 12.8 oz)   BMI 21.08 kg/m    Exam:  GENERAL APPEARANCE:  Alert, in no distress, appears frail   ENT:  [place], oropharynx clear   EYES:  conjunctiva and lids normal  RESP: diminished breath sounds bilaterally, no crackles or wheezes   CV:  regular rate and rhythm, no murmur, no edema   ABDOMEN:  soft, non-tender, no distension, no masses  M/S: up in chair.  Bilateral hand tremor. PORTILLO with good strength. No joint inflammation  SKIN:  no visible rashes or open areas  PSYCH:  oriented to self, insight and judgement impaired, memory impaired, affect normal     Labs:   Recent labs in [facility] reviewed by me today.     ASSESSMENT / PLAN:  (G30.1,  F02.80) Late onset Alzheimer's disease without behavioral disturbance (H)  (primary encounter diagnosis)  (F43.21) Adjustment disorder with depressed mood  (R13.10) Dysphagia, unspecified type  (R63.4) Weight loss  (Z51.5) Hospice care patient  Comment: progressive disease with severe deficits and weight loss  Spoke with her daughter Nava Bruce, who visits regularly and has noted fatigue, \"up and down\" days, appetite slightly better on pureed diet.   Plan: continue trazodone, escitalopram. Continue pureed diet. Memory Care staff assistance with cares, meals, activities and med admin. Comfort meds and cares with [facility].     (I48.0) PAF (paroxysmal atrial fibrillation) (H)  Comment: HR: 66  Plan: continue ASA    (N18.31) Stage 3a chronic kidney disease (H)  Comment: creatinine 0.84 on 11/16/2021  Plan: avoid nephrotoxins. No need for labs given goals of care     (J44.9) Chronic obstructive pulmonary disease, unspecified COPD type (H)  Comment: no acute issues   Plan: continue prednisone 7.5 mg daily, combivent and guaifenesin     (I73.9) PVD (peripheral vascular disease) (H)  Comment: no LE edema or wounds   History of bilateral iliac stents in 2017 "   Plan: continue ASA    (I10) Benign essential hypertension  Comment: controlled with BPs: 139/83, 144/88  Plan: continue amlodipine         Electronically signed by: CYNDI Glass CNP        Sincerely,        CYNDI Glass CNP

## 2022-09-19 PROBLEM — E46 PROTEIN-CALORIE MALNUTRITION (H): Status: ACTIVE | Noted: 2022-01-01

## 2022-09-19 NOTE — PROGRESS NOTES
"[facility] GERIATRICS    Chief Complaint   Patient presents with     RECHECK     HPI:  Zoraida Olivarez is a 88 year old  (4/11/1934), who is being [facility] today [facility] [[id number]].   She has lived in Memory Care at this facility since 3/2019. Medical history significant for dementia, COPD, afib,  PVD with bilateral iliac stents 2007, essential tremor, herpes zoster 1/2021, depression, chronic diarrhea, cerebral aneurysm repair in 1980 and on Keppra for seizure prophylaxis.   She was hospitalized at [facility] 2/2019 for E coli UTI with sepsis, afib with RVR and COPD exacerbation. She was on hospice, but improved and discharged from hospice 11/2019.  She's had a gradual decline in cognition and functional status with a 10 lb weight loss in 6 months. Enrolled with [facility] 8/4/2022.    Today's concern is:      Late onset Alzheimer's disease without behavioral disturbance (H)  Adjustment disorder with depressed mood  Protein-calorie malnutrition, unspecified severity (H)  Dysphagia, unspecified type  Weight loss  Hospice care patient  PAF (paroxysmal atrial fibrillation) (H)  Chronic obstructive pulmonary disease, unspecified COPD type (H)  Benign essential hypertension  She is unable to provide history. Awake and in bed. Hasn't eaten breakfast. Denies feeling ill. Denies pain. Staff reports oral intake has been poor. Daughter requests changing to a mechanical soft diet because she dislikes pureed. Ambulates without a device. Resistive with cares and refused all staff assistance yesterday. No falls.     Allergies, and PMH/PSH reviewed in [facility] today    REVIEW OF SYSTEMS:  Unable to obtain due to dementia. Positives as noted under HPI     Objective:   /62   Pulse 70   Temp 97.4  F (36.3  C)   Resp 24   Ht 1.626 m (5' 4\")   Wt 50.8 kg (112 lb)   SpO2 97%   BMI 19.22 kg/m    GENERAL " APPEARANCE:  Alert, in no distress, thin, frail appearing   ENT:  [place], oropharynx clear   EYES:  conjunctiva and lids normal  RESP: diminished breath sounds bilaterally, no crackles or wheezes   CV:  regular rate and rhythm, no murmur, no edema   ABDOMEN:  soft, non-tender, no distension, no masses  M/S: in bed.   Bilateral hand tremor. PORTILLO with good strength. No joint inflammation  SKIN:  no visible rashes or open areas  PSYCH:  oriented to self, insight and judgement impaired, memory impaired, affect normal     Recent labs in [facility] reviewed by me today.     ASSESSMENT / PLAN:  (G30.1,  F02.80) Late onset Alzheimer's disease without behavioral disturbance (H)  (primary encounter diagnosis)  (F43.21) Adjustment disorder with depressed mood  (E46) Protein-calorie malnutrition, unspecified severity (H)  (R13.10) Dysphagia, unspecified type  (R63.4) Weight loss  (Z51.5) Hospice care patient  Comment: gradually declining status. Weight is down 16 lbs in the past 3 months   Plan: change to mechanical diet to promote oral intake. Comfort meds and cares with [facility]. Continue escitalopram, trazodone. Memory Care staff assistance with cares, meals,activities, med admin.     (I48.0) PAF (paroxysmal atrial fibrillation) (H)  Comment: not requiring rate control with HR 70  Plan: continue ASA    (J44.9) Chronic obstructive pulmonary disease, unspecified COPD type (H)  Comment: no acute symptoms   Plan: continue prednisone, combivent, guaifenesin     (I10) Benign essential hypertension  Comment: controlled with recent /81  Plan: continue amlodipine. Avoid hypotension due to advanced age and fall risk         Electronically signed by: CYNDI Glass CNP

## 2022-09-19 NOTE — LETTER
9/19/2022        RE: Zoraida Olivarez  C/o Nava Teo  [address]  [facility] [address]        [facility] GERIATRICS    Chief Complaint   Patient presents with     RECHECK     HPI:  Zoraida Olivarez is a 88 year old  (4/11/1934), who is being seen today for an episodic care visit at: [facility] [[id number]].   She has lived in Memory Care at this facility since 3/2019. Medical history significant for dementia, COPD, afib,  PVD with bilateral iliac stents 2007, essential tremor, herpes zoster 1/2021, depression, chronic diarrhea, cerebral aneurysm repair in 1980 and on Keppra for seizure prophylaxis.   She was hospitalized at [facility] 2/2019 for E coli UTI with sepsis, afib with RVR and COPD exacerbation. She was on hospice, but improved and discharged from hospice 11/2019.  She's had a gradual decline in cognition and functional status with a 10 lb weight loss in 6 months. Enrolled with [facility] 8/4/2022.    Today's concern is:      Late onset Alzheimer's disease without behavioral disturbance (H)  Adjustment disorder with depressed mood  Protein-calorie malnutrition, unspecified severity (H)  Dysphagia, unspecified type  Weight loss  Hospice care patient  PAF (paroxysmal atrial fibrillation) (H)  Chronic obstructive pulmonary disease, unspecified COPD type (H)  Benign essential hypertension  She is unable to provide history. Awake and in bed. Hasn't eaten breakfast. Denies feeling ill. Denies pain. Staff reports oral intake has been poor. Daughter requests changing to a mechanical soft diet because she dislikes pureed. Ambulates without a device. Resistive with cares and refused all staff assistance yesterday. No falls.     Allergies, and PMH/PSH reviewed in [facility] today    REVIEW OF SYSTEMS:  Unable to obtain due to dementia. Positives as noted under HPI     Objective:   /62   Pulse 70   Temp 97.4  " F (36.3  C)   Resp 24   Ht 1.626 m (5' 4\")   Wt 50.8 kg (112 lb)   SpO2 97%   BMI 19.22 kg/m    GENERAL APPEARANCE:  Alert, in no distress, thin, frail appearing   ENT:  [place], oropharynx clear   EYES:  conjunctiva and lids normal  RESP: diminished breath sounds bilaterally, no crackles or wheezes   CV:  regular rate and rhythm, no murmur, no edema   ABDOMEN:  soft, non-tender, no distension, no masses  M/S: in bed.   Bilateral hand tremor. PORTILLO with good strength. No joint inflammation  SKIN:  no visible rashes or open areas  PSYCH:  oriented to self, insight and judgement impaired, memory impaired, affect normal     Recent labs in [facility] reviewed by me today.     ASSESSMENT / PLAN:  (G30.1,  F02.80) Late onset Alzheimer's disease without behavioral disturbance (H)  (primary encounter diagnosis)  (F43.21) Adjustment disorder with depressed mood  (E46) Protein-calorie malnutrition, unspecified severity (H)  (R13.10) Dysphagia, unspecified type  (R63.4) Weight loss  (Z51.5) Hospice care patient  Comment: gradually declining status. Weight is down 16 lbs in the past 3 months   Plan: change to mechanical diet to promote oral intake. Comfort meds and cares with [facility]. Continue escitalopram, trazodone. Memory Care staff assistance with cares, meals,activities, med admin.     (I48.0) PAF (paroxysmal atrial fibrillation) (H)  Comment: not requiring rate control with HR 70  Plan: continue ASA    (J44.9) Chronic obstructive pulmonary disease, unspecified COPD type (H)  Comment: no acute symptoms   Plan: continue prednisone, combivent, guaifenesin     (I10) Benign essential hypertension  Comment: controlled with recent /81  Plan: continue amlodipine. Avoid hypotension due to advanced age and fall risk         Electronically signed by: CYNDI Glass CNP           Sincerely,        CYNDI Glass CNP    "

## 2022-10-24 NOTE — PROGRESS NOTES
"[facility] GERIATRICS    Chief Complaint   Patient presents with     RECHECK     HPI:  Zoraida Olivarez is a 88 year old  (4/11/1934), who is being seen today for an episodic care visit at: [facility] ([facility] [[id number]].   She has lived in Memory Care at this facility since 3/2019. Medical history significant for dementia, COPD, afib,  PVD with bilateral iliac stents 2007, essential tremor, herpes zoster 1/2021, depression, chronic diarrhea, cerebral aneurysm repair in 1980 and on Keppra for seizure prophylaxis.   She was hospitalized at [facility] 2/2019 for E coli UTI with sepsis, afib with RVR and COPD exacerbation. She was on hospice, but improved and discharged from hospice 11/2019.  She's had a gradual decline in cognition and functional status with a significant weight loss. Enrolled with [facility] 8/4/2022.    Today's concern is:      Late onset Alzheimer's disease without behavioral disturbance (H)  Adjustment disorder with depressed mood  Protein-calorie malnutrition, unspecified severity (H)  Dysphagia, unspecified type  Weight loss  Hospice care patient  PAF (paroxysmal atrial fibrillation) (H)  Chronic obstructive pulmonary disease, unspecified COPD type (H)  Benign essential hypertension  She is unable to provide history. Resting in bed with her eyes open, but does not respond to questions. Did not eat breakfast today. Staff reports oral intake remains poor, but she sometimes comes to the dining room for meals. Ambulates without a device. Remains resistive with cares.     Allergies, and PMH/PSH reviewed in [facility] today    REVIEW OF SYSTEMS:  Unable to obtain due to dementia. Positives as noted under HPI     Objective:   /68   Pulse 68   Temp 96.8  F (36  C)   Resp (!) 32   Ht 1.626 m (5' 4\")   Wt 55.3 kg (122 lb)   SpO2 92%   BMI 20.94 kg/m    GENERAL APPEARANCE:  Alert, in no distress, frail appearing "   ENT:  [place], oropharynx clear   EYES:  conjunctiva and lids normal  RESP: diminished breath sounds bilaterally, no crackles or wheezes   CV:  regular rate and rhythm, no murmur, no edema   ABDOMEN:  soft, non-tender, no distension, no masses  M/S: in bed.   Bilateral hand tremor. PORTILLO with good strength. No joint inflammation  SKIN:  no visible rashes or open areas  PSYCH:  oriented to self, insight and judgement impaired, memory impaired, flat affect     Recent labs in [facility] reviewed by me today.     ASSESSMENT / PLAN:  (G30.1,  F02.80) Late onset Alzheimer's disease without behavioral disturbance (H)  (primary encounter diagnosis)  (F43.21) Adjustment disorder with depressed mood  (E46) Protein-calorie malnutrition, unspecified severity (H)  (R13.10) Dysphagia, unspecified type  (R63.4) Weight loss  (Z51.5) Hospice care patient  Comment: severe deficits with loss of language and low functional status. Appears comfortable. Weight is down 12 lbs in the past year    Plan: continue escitalopram and trazodone. Memory Care staff assistance with cares, meals, activities, med admin. Comfort meds and cares with [facility]. Continue mechanical soft diet for quality of life.     (I48.0) PAF (paroxysmal atrial fibrillation) (H)  Comment: no on ate controlling med with HR: 66-72  Plan: continue ASA    (J44.9) Chronic obstructive pulmonary disease, unspecified COPD type (H)  Comment: no acute issues   Plan: continue combivent, prednisone, guaifenesin     (I10) Benign essential hypertension  Comment: controlled with recent BPs: 131/68, 133/79    Plan: continue amlodipine        Electronically signed by: CYNDI Glass CNP

## 2022-10-24 NOTE — LETTER
"    10/24/2022        RE: Zoraida Olivaerz  C/o Nava Teo  [address]  [facility] [address]        [facility] GERIATRICS    Chief Complaint   Patient presents with     RECHECK     HPI:  Zoraida Olivarez is a 88 year old  (4/11/1934), who is being seen today for an episodic care visit at: [facility] [[id number]].   She has lived in Memory Care at this facility since 3/2019. Medical history significant for dementia, COPD, afib,  PVD with bilateral iliac stents 2007, essential tremor, herpes zoster 1/2021, depression, chronic diarrhea, cerebral aneurysm repair in 1980 and on Keppra for seizure prophylaxis.   She was hospitalized at [facility] 2/2019 for E coli UTI with sepsis, afib with RVR and COPD exacerbation. She was on hospice, but improved and discharged from hospice 11/2019.  She's had a gradual decline in cognition and functional status with a significant weight loss. Enrolled with [facility] 8/4/2022.    Today's concern is:      Late onset Alzheimer's disease without behavioral disturbance (H)  Adjustment disorder with depressed mood  Protein-calorie malnutrition, unspecified severity (H)  Dysphagia, unspecified type  Weight loss  Hospice care patient  PAF (paroxysmal atrial fibrillation) (H)  Chronic obstructive pulmonary disease, unspecified COPD type (H)  Benign essential hypertension  She is unable to provide history. Resting in bed with her eyes open, but does not respond to questions. Did not eat breakfast today. Staff reports oral intake remains poor, but she sometimes comes to the dining room for meals. Ambulates without a device. Remains resistive with cares.     Allergies, and PMH/PSH reviewed in [facility]IC today    REVIEW OF SYSTEMS:  Unable to obtain due to dementia. Positives as noted under HPI     Objective:   /68   Pulse 68   Temp 96.8  F (36  C)   Resp (!) 32   Ht 1.626 m (5' 4\")   Wt " 55.3 kg (122 lb)   SpO2 92%   BMI 20.94 kg/m    GENERAL APPEARANCE:  Alert, in no distress, frail appearing   ENT:  [place], oropharynx clear   EYES:  conjunctiva and lids normal  RESP: diminished breath sounds bilaterally, no crackles or wheezes   CV:  regular rate and rhythm, no murmur, no edema   ABDOMEN:  soft, non-tender, no distension, no masses  M/S: in bed.   Bilateral hand tremor. PORTILLO with good strength. No joint inflammation  SKIN:  no visible rashes or open areas  PSYCH:  oriented to self, insight and judgement impaired, memory impaired, flat affect     Recent labs in [facility] reviewed by me today.     ASSESSMENT / PLAN:  (G30.1,  F02.80) Late onset Alzheimer's disease without behavioral disturbance (H)  (primary encounter diagnosis)  (F43.21) Adjustment disorder with depressed mood  (E46) Protein-calorie malnutrition, unspecified severity (H)  (R13.10) Dysphagia, unspecified type  (R63.4) Weight loss  (Z51.5) Hospice care patient  Comment: severe deficits with loss of language and low functional status. Appears comfortable. Weight is down 12 lbs in the past year    Plan: continue escitalopram and trazodone. Memory Care staff assistance with cares, meals, activities, med admin. Comfort meds and cares with [facility]. Continue mechanical soft diet for quality of life.     (I48.0) PAF (paroxysmal atrial fibrillation) (H)  Comment: no on ate controlling med with HR: 66-72  Plan: continue ASA    (J44.9) Chronic obstructive pulmonary disease, unspecified COPD type (H)  Comment: no acute issues   Plan: continue combivent, prednisone, guaifenesin     (I10) Benign essential hypertension  Comment: controlled with recent BPs: 131/68, 133/79    Plan: continue amlodipine        Electronically signed by: CYNDI Glass CNP           Sincerely,        CYNDI Glass CNP

## 2022-12-12 NOTE — PROGRESS NOTES
"[facility] GERIATRICS    Chief Complaint   Patient presents with     RECHECK     HPI:  Zoraida Olivarez is a 88 year old  (4/11/1934), who is being seen today for an episodic care visit at: [facility] [[id number]].   She has lived in Memory Care at this facility since 3/2019. Medical history significant for dementia, COPD, afib,  PVD with bilateral iliac stents 2007, essential tremor, herpes zoster 1/2021, depression, chronic diarrhea, cerebral aneurysm repair in 1980 and on Keppra for seizure prophylaxis.   She was hospitalized at [facility] 2/2019 for E coli UTI with sepsis, afib with RVR and COPD exacerbation. She was on hospice, but improved and discharged from hospice 11/2019.  She  had a gradual decline in cognition and functional status with a significant weight loss. Enrolled with [facility] 8/4/2022.    Today's concern is:      Late onset Alzheimer's disease without behavioral disturbance (H)  Adjustment disorder with depressed mood  Protein-calorie malnutrition, unspecified severity (H)  Dysphagia, unspecified type  Weight loss  Hospice care patient  PAF (paroxysmal atrial fibrillation) (H)  Chronic obstructive pulmonary disease, unspecified COPD type (H)  Benign essential hypertension   She is alert and interactive today. Unable to provide history. \"I'm ok.\" Denies pain. Denies feeling ill. Up and about in her room. Staff reports her oral intake remains poor. Tolerating mechanical soft diet with thin liquids. She is resistive with cares at times.       Allergies, and PMH/PSH reviewed in EPIC today    REVIEW OF SYSTEMS:  Unable to obtain due to dementia. Positives as noted under HPI.       Objective:   BP (!) 165/90   Pulse 71   Temp 97.2  F (36.2  C)   Resp 18   Wt 52.4 kg (115 lb 9.6 oz)   BMI 19.84 kg/m    GENERAL APPEARANCE:  Alert, in no distress, thin   ENT:  [place], oropharynx clear   EYES:  conjunctiva and lids " normal  RESP: diminished breath sounds bilaterally, no crackles or wheezes   CV:  regular rate and rhythm, no murmur, no edema   ABDOMEN:  soft, non-tender, no distension, no masses  M/S: gait steady.  Bilateral hand tremor. PORTILLO with good strength. No joint inflammation  SKIN:  no visible rashes or open areas  PSYCH:  oriented to self, insight and judgement impaired, memory impaired, flat affect       ASSESSMENT / PLAN:  (G30.1,  F02.80) Late onset Alzheimer's disease without behavioral disturbance (H)  (primary encounter diagnosis)  (F43.21) Adjustment disorder with depressed mood  (E46) Protein-calorie malnutrition, unspecified severity (H)  (R13.10) Dysphagia, unspecified type  (R63.4) Weight loss   (Z51.5) Hospice care patient  Comment: severe deficits, low functional status. Weight is down another 7 lbs, 19 lbs in the past year   Plan: continue escitalopram, trazodone. Comfort meds and cares with [facility]. Memory Care staff assistance with cares, meals, activities, med admin      (I48.0) PAF (paroxysmal atrial fibrillation) (H)  Comment: not on rate controlling med. HR: 71-76  Plan: continue ASA    (J44.9) Chronic obstructive pulmonary disease, unspecified COPD type (H)  Comment: no acute symptoms   Plan: continue prednisone, Combivent    (I10) Benign essential hypertension  Comment: acceptable control with BPs: 165/90, 159/96, 110/79  Plan: continue amlodipine             Electronically signed by: CYNDI Glass CNP

## 2022-12-12 NOTE — LETTER
"    12/12/2022        RE: Zoraida Olivarez  C/o Nava Teo  [address]  [place] [address]        [facility] GERIATRICS    Chief Complaint   Patient presents with     RECHECK     HPI:  Zoraida Olivarez is a 88 year old  (4/11/1934), who is being seen today for an episodic care visit at: [facility] ([facility] [[id number]].   She has lived in Memory Care at this facility since 3/2019. Medical history significant for dementia, COPD, afib,  PVD with bilateral iliac stents 2007, essential tremor, herpes zoster 1/2021, depression, chronic diarrhea, cerebral aneurysm repair in 1980 and on Keppra for seizure prophylaxis.   She was hospitalized at [facility] 2/2019 for E coli UTI with sepsis, afib with RVR and COPD exacerbation. She was on hospice, but improved and discharged from hospice 11/2019.  She  had a gradual decline in cognition and functional status with a significant weight loss. Enrolled with [facility] 8/4/2022.    Today's concern is:      Late onset Alzheimer's disease without behavioral disturbance (H)  Adjustment disorder with depressed mood  Protein-calorie malnutrition, unspecified severity (H)  Dysphagia, unspecified type  Weight loss  Hospice care patient  PAF (paroxysmal atrial fibrillation) (H)  Chronic obstructive pulmonary disease, unspecified COPD type (H)  Benign essential hypertension   She is alert and interactive today. Unable to provide history. \"I'm ok.\" Denies pain. Denies feeling ill. Up and about in her room. Staff reports her oral intake remains poor. Tolerating mechanical soft diet with thin liquids. She is resistive with cares at times.       Allergies, and PMH/PSH reviewed in EPIC today    REVIEW OF SYSTEMS:  Unable to obtain due to dementia. Positives as noted under HPI.       Objective:   BP (!) 165/90   Pulse 71   Temp 97.2  F (36.2  C)   Resp 18   Wt 52.4 kg (115 lb 9.6 oz)   BMI 19.84 " kg/m    GENERAL APPEARANCE:  Alert, in no distress, thin   ENT:  [place], oropharynx clear   EYES:  conjunctiva and lids normal  RESP: diminished breath sounds bilaterally, no crackles or wheezes   CV:  regular rate and rhythm, no murmur, no edema   ABDOMEN:  soft, non-tender, no distension, no masses  M/S: gait steady.  Bilateral hand tremor. PORTILLO with good strength. No joint inflammation  SKIN:  no visible rashes or open areas  PSYCH:  oriented to self, insight and judgement impaired, memory impaired, flat affect       ASSESSMENT / PLAN:  (G30.1,  F02.80) Late onset Alzheimer's disease without behavioral disturbance (H)  (primary encounter diagnosis)  (F43.21) Adjustment disorder with depressed mood  (E46) Protein-calorie malnutrition, unspecified severity (H)  (R13.10) Dysphagia, unspecified type  (R63.4) Weight loss   (Z51.5) Hospice care patient  Comment: severe deficits, low functional status. Weight is down another 7 lbs, 19 lbs in the past year   Plan: continue escitalopram, trazodone. Comfort meds and cares with [facility]. Memory Care staff assistance with cares, meals, activities, med admin      (I48.0) PAF (paroxysmal atrial fibrillation) (H)  Comment: not on rate controlling med. HR: 71-76  Plan: continue ASA    (J44.9) Chronic obstructive pulmonary disease, unspecified COPD type (H)  Comment: no acute symptoms   Plan: continue prednisone, Combivent    (I10) Benign essential hypertension  Comment: acceptable control with BPs: 165/90, 159/96, 110/79  Plan: continue amlodipine             Electronically signed by: CYNDI Glass CNP             Sincerely,        CYNDI Glass CNP

## 2023-01-01 ENCOUNTER — ASSISTED LIVING VISIT (OUTPATIENT)
Dept: GERIATRICS | Facility: CLINIC | Age: 88
End: 2023-01-01
Payer: OTHER MISCELLANEOUS

## 2023-01-01 VITALS
DIASTOLIC BLOOD PRESSURE: 55 MMHG | SYSTOLIC BLOOD PRESSURE: 98 MMHG | WEIGHT: 103.8 LBS | BODY MASS INDEX: 17.82 KG/M2 | TEMPERATURE: 96.7 F | RESPIRATION RATE: 18 BRPM | HEART RATE: 44 BPM

## 2023-01-01 DIAGNOSIS — E46 PROTEIN-CALORIE MALNUTRITION, UNSPECIFIED SEVERITY (H): ICD-10-CM

## 2023-01-01 DIAGNOSIS — R63.4 WEIGHT LOSS: ICD-10-CM

## 2023-01-01 DIAGNOSIS — F43.21 ADJUSTMENT DISORDER WITH DEPRESSED MOOD: ICD-10-CM

## 2023-01-01 DIAGNOSIS — R13.10 DYSPHAGIA, UNSPECIFIED TYPE: ICD-10-CM

## 2023-01-01 DIAGNOSIS — G30.1 LATE ONSET ALZHEIMER'S DISEASE WITHOUT BEHAVIORAL DISTURBANCE (H): Primary | ICD-10-CM

## 2023-01-01 DIAGNOSIS — Z51.5 HOSPICE CARE PATIENT: ICD-10-CM

## 2023-01-01 DIAGNOSIS — F02.80 LATE ONSET ALZHEIMER'S DISEASE WITHOUT BEHAVIORAL DISTURBANCE (H): Primary | ICD-10-CM

## 2023-01-01 PROCEDURE — 99348 HOME/RES VST EST LOW MDM 30: CPT | Performed by: NURSE PRACTITIONER

## 2023-01-17 NOTE — PROGRESS NOTES
[facility] GERIATRICS    Chief Complaint   Patient presents with     RECHECK     HPI:  Zoraida Olivarez is a 88 year old  (4/11/1934), who is being seen today for an episodic care visit at: [facility] [[id number]].   She has lived in Memory Care at this facility since 3/2019. Medical history significant for dementia, COPD, afib,  PVD with bilateral iliac stents 2007, essential tremor, herpes zoster 1/2021, depression, chronic diarrhea, cerebral aneurysm repair in 1980 and on Keppra for seizure prophylaxis.   She was hospitalized at [facility] 2/2019 for E coli UTI with sepsis, afib with RVR and COPD exacerbation. She was on hospice, but improved and discharged from hospice 11/2019.    She  had a gradual decline in cognition and functional status with a significant weight loss. Enrolled with [facility] 8/4/2022.    Today's concern is:      Late onset Alzheimer's disease without behavioral disturbance (H)  Adjustment disorder with depressed mood  Protein-calorie malnutrition, unspecified severity (H)  Dysphagia, unspecified type  Weight loss  Hospice care patient   She is seen today after staff reported a decline in status over the weekend. She's had no intake for the past 2 days. Hospice has dc'd all meds except comfort meds. Staff reports she appears comfortable. Weight is down 12 lbs in the past month.       Allergies, and PMH/PSH reviewed in EPIC today    REVIEW OF SYSTEMS:  Unable to obtain due to dementia. Positives as noted under HPI.       Objective:   BP 98/55   Pulse (!) 44   Temp (!) 96.7  F (35.9  C)   Resp 18   Wt 47.1 kg (103 lb 12.8 oz)   BMI 17.82 kg/m    GENERAL APPEARANCE:  sleeping soundly, very thin and frail   RESP:diffuse crackles bilaterally, respirations non labored    CV:  regular rate and rhythm, no murmur, no LE edema, feet cool to touch   ABDOMEN: no distension, no masses  SKIN:  no visible rashes or  open areas    ASSESSMENT / PLAN:  (G30.1,  F02.80) Late onset Alzheimer's disease without behavioral disturbance (H)  (primary encounter diagnosis)  (F43.21) Adjustment disorder with depressed mood  (E46) Protein-calorie malnutrition, unspecified severity (H)  (R13.10) Dysphagia, unspecified type  (R63.4) Weight loss  (Z51.5) Hospice care patient  Comment: early stages of actively dying. She appears comfortable   Plan: discussed with her daughter Nava Bruce, who has a good understanding of the situation. Family members were able to visit over the weekend to say good bye and Nava will be here later today. Continue comfort meds and cares with [facility]. Memory Care staff assistance with cares, meals, activities, med admin.   Discussed with staff.             Electronically signed by: CYNDI Glass CNP

## 2023-01-17 NOTE — LETTER
1/17/2023        RE: Zoraida Olivarez  C/o Nava Teo  [address] [facility] [address]        [facility] GERIATRICS    Chief Complaint   Patient presents with     RECHECK     HPI:  Zoraida Olivarez is a 88 year old  (4/11/1934), who is being seen today for an episodic care visit at: [facility] ([facility]) [[id number]]. Today's concern is: ***    Allergies, and PMH/PSH reviewed in [facility] today.  REVIEW OF SYSTEMS:  {[id number]}    Objective:   BP 98/55   Pulse (!) 44   Temp (!) 96.7  F (35.9  C)   Resp 18   Wt 47.1 kg (103 lb 12.8 oz)   BMI 17.82 kg/m    {Nursing home physical exam :836509}    {fgslab:864496}    Assessment/Plan:  {[facility] DX2:711945}    MED REC REQUIRED{TIP  Click the link below to document or use med rec list, use list to pull in response :[id number]}  Post Medication Reconciliation Status: {MED REC LIST:939955}      Orders:  {fgsorders:415378}  ***    Electronically signed by: Derek Reed ***            Sincerely,        CYNDI Glass CNP

## 2023-02-27 ENCOUNTER — LAB REQUISITION (OUTPATIENT)
Dept: LAB | Facility: CLINIC | Age: 88
End: 2023-02-27
Payer: OTHER MISCELLANEOUS

## 2023-02-27 DIAGNOSIS — U07.1 COVID-19: ICD-10-CM
